# Patient Record
Sex: FEMALE | Race: WHITE | NOT HISPANIC OR LATINO
[De-identification: names, ages, dates, MRNs, and addresses within clinical notes are randomized per-mention and may not be internally consistent; named-entity substitution may affect disease eponyms.]

---

## 2017-10-30 ENCOUNTER — CLINICAL ADVICE (OUTPATIENT)
Age: 72
End: 2017-10-30

## 2017-11-19 DIAGNOSIS — Z82.49 FAMILY HISTORY OF ISCHEMIC HEART DISEASE AND OTHER DISEASES OF THE CIRCULATORY SYSTEM: ICD-10-CM

## 2017-11-19 DIAGNOSIS — Z80.8 FAMILY HISTORY OF MALIGNANT NEOPLASM OF OTHER ORGANS OR SYSTEMS: ICD-10-CM

## 2017-11-19 DIAGNOSIS — Z82.0 FAMILY HISTORY OF EPILEPSY AND OTHER DISEASES OF THE NERVOUS SYSTEM: ICD-10-CM

## 2017-11-19 DIAGNOSIS — Z87.891 PERSONAL HISTORY OF NICOTINE DEPENDENCE: ICD-10-CM

## 2017-11-19 DIAGNOSIS — K44.9 DIAPHRAGMATIC HERNIA W/OUT OBSTRUCTION OR GANGRENE: ICD-10-CM

## 2017-11-19 DIAGNOSIS — Z82.5 FAMILY HISTORY OF ASTHMA AND OTHER CHRONIC LOWER RESPIRATORY DISEASES: ICD-10-CM

## 2017-11-19 DIAGNOSIS — G44.009 CLUSTER HEADACHE SYNDROME, UNSPECIFIED, NOT INTRACTABLE: ICD-10-CM

## 2017-11-19 DIAGNOSIS — Z83.3 FAMILY HISTORY OF DIABETES MELLITUS: ICD-10-CM

## 2017-11-22 ENCOUNTER — APPOINTMENT (OUTPATIENT)
Dept: FAMILY MEDICINE | Facility: CLINIC | Age: 72
End: 2017-11-22
Payer: MEDICARE

## 2017-11-22 VITALS — DIASTOLIC BLOOD PRESSURE: 80 MMHG | SYSTOLIC BLOOD PRESSURE: 118 MMHG

## 2017-11-22 VITALS
HEART RATE: 87 BPM | OXYGEN SATURATION: 97 % | RESPIRATION RATE: 16 BRPM | BODY MASS INDEX: 27.99 KG/M2 | SYSTOLIC BLOOD PRESSURE: 130 MMHG | DIASTOLIC BLOOD PRESSURE: 90 MMHG | HEIGHT: 65 IN | WEIGHT: 168 LBS

## 2017-11-22 DIAGNOSIS — M12.819 OTHER SPECIFIC ARTHROPATHIES, NOT ELSEWHERE CLASSIFIED, UNSPECIFIED SHOULDER: ICD-10-CM

## 2017-11-22 PROCEDURE — G0008: CPT

## 2017-11-22 PROCEDURE — 90662 IIV NO PRSV INCREASED AG IM: CPT

## 2017-11-22 PROCEDURE — 99213 OFFICE O/P EST LOW 20 MIN: CPT | Mod: 25

## 2017-11-28 ENCOUNTER — APPOINTMENT (OUTPATIENT)
Dept: FAMILY MEDICINE | Facility: CLINIC | Age: 72
End: 2017-11-28

## 2017-12-20 ENCOUNTER — APPOINTMENT (OUTPATIENT)
Dept: FAMILY MEDICINE | Facility: CLINIC | Age: 72
End: 2017-12-20
Payer: MEDICARE

## 2017-12-20 VITALS
HEIGHT: 65 IN | HEART RATE: 85 BPM | RESPIRATION RATE: 16 BRPM | DIASTOLIC BLOOD PRESSURE: 93 MMHG | WEIGHT: 168 LBS | BODY MASS INDEX: 27.99 KG/M2 | SYSTOLIC BLOOD PRESSURE: 144 MMHG

## 2017-12-20 VITALS — SYSTOLIC BLOOD PRESSURE: 130 MMHG | DIASTOLIC BLOOD PRESSURE: 84 MMHG

## 2017-12-20 PROCEDURE — 99213 OFFICE O/P EST LOW 20 MIN: CPT

## 2018-01-02 ENCOUNTER — CLINICAL ADVICE (OUTPATIENT)
Age: 73
End: 2018-01-02

## 2018-01-18 ENCOUNTER — APPOINTMENT (OUTPATIENT)
Dept: FAMILY MEDICINE | Facility: CLINIC | Age: 73
End: 2018-01-18
Payer: MEDICARE

## 2018-01-18 VITALS
RESPIRATION RATE: 16 BRPM | BODY MASS INDEX: 27.99 KG/M2 | HEIGHT: 65 IN | HEART RATE: 80 BPM | WEIGHT: 168 LBS | SYSTOLIC BLOOD PRESSURE: 130 MMHG | OXYGEN SATURATION: 98 % | DIASTOLIC BLOOD PRESSURE: 82 MMHG

## 2018-01-18 PROCEDURE — 99213 OFFICE O/P EST LOW 20 MIN: CPT

## 2018-01-18 RX ORDER — DIAZEPAM 5 MG/1
5 TABLET ORAL 3 TIMES DAILY
Qty: 90 | Refills: 0 | Status: DISCONTINUED | COMMUNITY
Start: 2017-12-20 | End: 2018-01-18

## 2018-01-18 RX ORDER — METOPROLOL SUCCINATE 25 MG/1
25 TABLET, EXTENDED RELEASE ORAL DAILY
Qty: 90 | Refills: 3 | Status: DISCONTINUED | COMMUNITY
Start: 2017-12-20 | End: 2018-01-18

## 2018-01-18 RX ORDER — DIAZEPAM 5 MG/1
5 TABLET ORAL
Refills: 0 | Status: DISCONTINUED | COMMUNITY
End: 2018-01-18

## 2018-01-19 ENCOUNTER — RX RENEWAL (OUTPATIENT)
Age: 73
End: 2018-01-19

## 2018-01-31 ENCOUNTER — RX RENEWAL (OUTPATIENT)
Age: 73
End: 2018-01-31

## 2018-02-21 ENCOUNTER — APPOINTMENT (OUTPATIENT)
Dept: FAMILY MEDICINE | Facility: CLINIC | Age: 73
End: 2018-02-21
Payer: MEDICARE

## 2018-02-21 VITALS
HEART RATE: 77 BPM | OXYGEN SATURATION: 99 % | HEIGHT: 65 IN | DIASTOLIC BLOOD PRESSURE: 82 MMHG | WEIGHT: 170 LBS | BODY MASS INDEX: 28.32 KG/M2 | SYSTOLIC BLOOD PRESSURE: 118 MMHG | RESPIRATION RATE: 16 BRPM

## 2018-02-21 DIAGNOSIS — Z87.898 PERSONAL HISTORY OF OTHER SPECIFIED CONDITIONS: ICD-10-CM

## 2018-02-21 PROCEDURE — 99213 OFFICE O/P EST LOW 20 MIN: CPT

## 2018-02-21 RX ORDER — DIAZEPAM 5 MG/1
5 TABLET ORAL 3 TIMES DAILY
Qty: 90 | Refills: 0 | Status: DISCONTINUED | COMMUNITY
Start: 2017-11-22 | End: 2018-02-21

## 2018-03-16 ENCOUNTER — RX RENEWAL (OUTPATIENT)
Age: 73
End: 2018-03-16

## 2018-03-27 ENCOUNTER — RX RENEWAL (OUTPATIENT)
Age: 73
End: 2018-03-27

## 2018-04-10 ENCOUNTER — APPOINTMENT (OUTPATIENT)
Dept: FAMILY MEDICINE | Facility: CLINIC | Age: 73
End: 2018-04-10
Payer: MEDICARE

## 2018-04-10 VITALS
SYSTOLIC BLOOD PRESSURE: 112 MMHG | BODY MASS INDEX: 28.32 KG/M2 | TEMPERATURE: 98.2 F | WEIGHT: 170 LBS | HEART RATE: 54 BPM | DIASTOLIC BLOOD PRESSURE: 72 MMHG | RESPIRATION RATE: 16 BRPM | OXYGEN SATURATION: 98 % | HEIGHT: 65 IN

## 2018-04-10 PROCEDURE — 99213 OFFICE O/P EST LOW 20 MIN: CPT

## 2018-04-10 RX ORDER — DIAZEPAM 5 MG/1
5 TABLET ORAL 3 TIMES DAILY
Qty: 90 | Refills: 0 | Status: DISCONTINUED | COMMUNITY
Start: 2018-01-18 | End: 2018-04-10

## 2018-05-03 ENCOUNTER — APPOINTMENT (OUTPATIENT)
Dept: FAMILY MEDICINE | Facility: CLINIC | Age: 73
End: 2018-05-03
Payer: MEDICARE

## 2018-05-03 VITALS
SYSTOLIC BLOOD PRESSURE: 136 MMHG | HEIGHT: 65 IN | BODY MASS INDEX: 28.32 KG/M2 | DIASTOLIC BLOOD PRESSURE: 86 MMHG | OXYGEN SATURATION: 98 % | HEART RATE: 81 BPM | RESPIRATION RATE: 16 BRPM | WEIGHT: 170 LBS

## 2018-05-03 PROCEDURE — 99213 OFFICE O/P EST LOW 20 MIN: CPT

## 2018-05-03 RX ORDER — AMOXICILLIN 500 MG/1
500 CAPSULE ORAL 3 TIMES DAILY
Qty: 30 | Refills: 0 | Status: DISCONTINUED | COMMUNITY
Start: 2018-04-10 | End: 2018-05-03

## 2018-05-08 ENCOUNTER — RX RENEWAL (OUTPATIENT)
Age: 73
End: 2018-05-08

## 2018-05-17 ENCOUNTER — RX RENEWAL (OUTPATIENT)
Age: 73
End: 2018-05-17

## 2018-05-22 ENCOUNTER — APPOINTMENT (OUTPATIENT)
Dept: FAMILY MEDICINE | Facility: CLINIC | Age: 73
End: 2018-05-22
Payer: MEDICARE

## 2018-05-22 VITALS
HEART RATE: 76 BPM | SYSTOLIC BLOOD PRESSURE: 124 MMHG | RESPIRATION RATE: 15 BRPM | WEIGHT: 170 LBS | DIASTOLIC BLOOD PRESSURE: 78 MMHG | HEIGHT: 65 IN | OXYGEN SATURATION: 98 % | BODY MASS INDEX: 28.32 KG/M2

## 2018-05-22 PROCEDURE — 99214 OFFICE O/P EST MOD 30 MIN: CPT

## 2018-05-22 RX ORDER — DIPHENHYDRAMINE HCL 2 %
25 CREAM (GRAM) TOPICAL
Refills: 0 | Status: DISCONTINUED | COMMUNITY
End: 2018-05-22

## 2018-05-22 RX ORDER — METHYLPREDNISOLONE 4 MG/1
4 TABLET ORAL
Qty: 1 | Refills: 0 | Status: DISCONTINUED | COMMUNITY
Start: 2018-03-16 | End: 2018-05-22

## 2018-05-22 RX ORDER — FLUTICASONE PROPIONATE 50 UG/1
50 SPRAY, METERED NASAL DAILY
Qty: 1 | Refills: 5 | Status: DISCONTINUED | COMMUNITY
Start: 2018-04-10 | End: 2018-05-22

## 2018-05-22 RX ORDER — IRON/IRON ASP GLY/FA/MV-MIN 38 125-25-1MG
TABLET ORAL
Refills: 0 | Status: DISCONTINUED | COMMUNITY
End: 2018-05-22

## 2018-05-22 RX ORDER — DICLOFENAC SODIUM 10 MG/G
1 GEL TOPICAL
Refills: 0 | Status: DISCONTINUED | COMMUNITY
End: 2018-05-22

## 2018-05-22 RX ORDER — AZITHROMYCIN 250 MG/1
250 TABLET, FILM COATED ORAL
Qty: 6 | Refills: 0 | Status: DISCONTINUED | COMMUNITY
Start: 2018-05-03 | End: 2018-05-22

## 2018-05-22 RX ORDER — MULTIVIT-MIN/IRON/FOLIC ACID/K 18-600-40
CAPSULE ORAL
Refills: 0 | Status: DISCONTINUED | COMMUNITY
End: 2018-05-22

## 2018-05-22 NOTE — HISTORY OF PRESENT ILLNESS
[Previous Adverse Anesthesia Reaction] : previous adverse anesthesia reaction [Hypertension] : ~T hypertension [Coronary Artery Disease] : no coronary artery disease [FreeTextEntry1] : Left hip replacement  [FreeTextEntry2] : 05/30/2018 [FreeTextEntry3] : Dr. Calin Sharma  [FreeTextEntry4] : pt had all pre surgical testing done at [facility].  She saw Dr Sylvester last week due to the abnormality on the chest CT.  [FreeTextEntry5] : she had 2 epidural injections last week and had nausea and chills and anxiety after

## 2018-05-22 NOTE — RESULTS/DATA
[] : results reviewed [de-identified] : the EKG is unchanged from a prior EKG from her cardiology office

## 2018-05-22 NOTE — PHYSICAL EXAM
[No Acute Distress] : no acute distress [Well Nourished] : well nourished [Well Developed] : well developed [Well-Appearing] : well-appearing [Normal Voice/Communication] : normal voice/communication [Normal Sclera/Conjunctiva] : normal sclera/conjunctiva [Normal Outer Ear/Nose] : the outer ears and nose were normal in appearance [Normal Oropharynx] : the oropharynx was normal [Normal TMs] : both tympanic membranes were normal [No JVD] : no jugular venous distention [Supple] : supple [No Lymphadenopathy] : no lymphadenopathy [Thyroid Normal, No Nodules] : the thyroid was normal and there were no nodules present [No Respiratory Distress] : no respiratory distress  [Clear to Auscultation] : lungs were clear to auscultation bilaterally [No Accessory Muscle Use] : no accessory muscle use [Normal Rate] : normal rate  [Regular Rhythm] : with a regular rhythm [Normal S1, S2] : normal S1 and S2 [No Edema] : there was no peripheral edema [Soft] : abdomen soft [Non Tender] : non-tender [Non-distended] : non-distended [No Masses] : no abdominal mass palpated [No HSM] : no HSM [Normal Bowel Sounds] : normal bowel sounds [No Hernias] : no hernias [Speech Grossly Normal] : speech grossly normal [Memory Grossly Normal] : memory grossly normal [Normal Affect] : the affect was normal [Normal Mood] : the mood was normal [Normal Insight/Judgement] : insight and judgment were intact [de-identified] : antalgic gait

## 2018-05-22 NOTE — REVIEW OF SYSTEMS
[Joint Pain] : joint pain [Joint Stiffness] : joint stiffness [Muscle Pain] : muscle pain [Back Pain] : back pain [Headache] : headache [Insomnia] : insomnia [Anxiety] : anxiety [Negative] : Heme/Lymph

## 2018-05-22 NOTE — ASSESSMENT
[Patient Optimized for Surgery] : Patient optimized for surgery [No Further Testing Recommended] : no further testing recommended [Continue medications as is] : Continue current medications [As per surgery] : as per surgery [FreeTextEntry4] : she is medically cleared for surgery [FreeTextEntry7] : she should take Fioricet, metoprolol and nexium with a sip of water on the morning of surgery

## 2018-06-13 ENCOUNTER — APPOINTMENT (OUTPATIENT)
Dept: FAMILY MEDICINE | Facility: CLINIC | Age: 73
End: 2018-06-13
Payer: MEDICARE

## 2018-06-13 VITALS
SYSTOLIC BLOOD PRESSURE: 114 MMHG | HEART RATE: 84 BPM | RESPIRATION RATE: 15 BRPM | OXYGEN SATURATION: 97 % | HEIGHT: 65 IN | DIASTOLIC BLOOD PRESSURE: 72 MMHG | BODY MASS INDEX: 28.32 KG/M2 | WEIGHT: 170 LBS

## 2018-06-13 DIAGNOSIS — G47.00 INSOMNIA, UNSPECIFIED: ICD-10-CM

## 2018-06-13 PROCEDURE — 99495 TRANSJ CARE MGMT MOD F2F 14D: CPT

## 2018-06-13 NOTE — HISTORY OF PRESENT ILLNESS
[FreeTextEntry1] : pt presents for a follow up apt after left hip replacement. pt also has a horse voice that is straining to talk. feels like she isnt breathing the same as before she had the surgery, very fatigue.  [de-identified] : She has had a persistant hoarseness in her voice.  She denies any heartburn, nasal congestion or postnasal drip.  She is taking honey without improvement.  She isn't coughing but can't get a full deep breath.  She hasn't been sleeping well as she has to lie on her back.  She feels fatigued.  She stopped the oxycodone last week.  She is doing her home exercises.  She plans to schedule a repeat CT of the chest in november.  She is taking the diazepam tid.  She has an itchy rash on the back of her neck.  She tried melatonin and benadryl for sleep without improvement. She has to wake up several times per night to urinate.  She had spinal anesthesia for the surgery.  She wants to wait until our november visit to change from zetia to atorvastatin.  She wants to wait until september to receive a pneumococcal vaccine.

## 2018-06-13 NOTE — ASSESSMENT
[FreeTextEntry1] : ENT consultation was advised, she declined a rx for sleep, I renewed diazepam and checked I-STOP, she was reassured that her weakness and breathlessness will likely resolve soon, she will follow up in september for Prevnar 13 and in november for a med check or sooner prn

## 2018-06-13 NOTE — REVIEW OF SYSTEMS
[Fatigue] : fatigue [Hoarseness] : hoarseness [Sore Throat] : sore throat [Shortness Of Breath] : shortness of breath [Joint Pain] : joint pain [Joint Stiffness] : joint stiffness [Muscle Weakness] : muscle weakness [Muscle Pain] : muscle pain [Headache] : headache [Insomnia] : insomnia [Anxiety] : anxiety [Negative] : Heme/Lymph [Nasal Discharge] : no nasal discharge [Cough] : no cough

## 2018-06-13 NOTE — PHYSICAL EXAM
[No Acute Distress] : no acute distress [Well Nourished] : well nourished [Well Developed] : well developed [Well-Appearing] : well-appearing [Normal Voice/Communication] : normal voice/communication [Normal Sclera/Conjunctiva] : normal sclera/conjunctiva [Normal Outer Ear/Nose] : the outer ears and nose were normal in appearance [Normal Oropharynx] : the oropharynx was normal [Normal TMs] : both tympanic membranes were normal [No JVD] : no jugular venous distention [Supple] : supple [No Lymphadenopathy] : no lymphadenopathy [No Respiratory Distress] : no respiratory distress  [Clear to Auscultation] : lungs were clear to auscultation bilaterally [No Accessory Muscle Use] : no accessory muscle use [Normal Rate] : normal rate  [Regular Rhythm] : with a regular rhythm [Normal S1, S2] : normal S1 and S2 [No Murmur] : no murmur heard [Speech Grossly Normal] : speech grossly normal [Memory Grossly Normal] : memory grossly normal [Normal Affect] : the affect was normal [Normal Mood] : the mood was normal [Normal Insight/Judgement] : insight and judgment were intact [de-identified] : minimal erythematous, flat rash on posterior neck [de-identified] : gait is antalgic

## 2018-06-22 ENCOUNTER — APPOINTMENT (OUTPATIENT)
Dept: FAMILY MEDICINE | Facility: CLINIC | Age: 73
End: 2018-06-22
Payer: MEDICARE

## 2018-06-22 VITALS
OXYGEN SATURATION: 98 % | TEMPERATURE: 98.5 F | HEART RATE: 77 BPM | SYSTOLIC BLOOD PRESSURE: 114 MMHG | DIASTOLIC BLOOD PRESSURE: 70 MMHG

## 2018-06-22 VITALS — HEIGHT: 65 IN | WEIGHT: 170 LBS | BODY MASS INDEX: 28.32 KG/M2

## 2018-06-22 PROCEDURE — 99214 OFFICE O/P EST MOD 30 MIN: CPT

## 2018-06-22 NOTE — REVIEW OF SYSTEMS
[Joint Pain] : joint pain [Muscle Pain] : muscle pain [Negative] : Heme/Lymph [FreeTextEntry9] : severe pain in left groin

## 2018-06-22 NOTE — HISTORY OF PRESENT ILLNESS
[Hypertension] : ~T hypertension [Coronary Artery Disease] : no coronary artery disease [Diabetes] : no diabetes [Sleep Apnea] : no sleep apnea [COPD] : no COPD [Previous Adverse Anesthesia Reaction] : no previous adverse anesthesia reaction [FreeTextEntry1] : Left total hip revision [FreeTextEntry2] : 06/25/18 [FreeTextEntry3] : Dr. Calin Grullon  [FreeTextEntry4] : Pt is here for medical clearance. Pt had PST at [facility] on 6/21/18.  The part of the artificial hip in the femur slipped down and she is in severe pain.

## 2018-06-22 NOTE — PHYSICAL EXAM
[No Acute Distress] : no acute distress [Well Nourished] : well nourished [Well Developed] : well developed [Well-Appearing] : well-appearing [Normal Voice/Communication] : normal voice/communication [Normal Sclera/Conjunctiva] : normal sclera/conjunctiva [Normal Outer Ear/Nose] : the outer ears and nose were normal in appearance [Normal Oropharynx] : the oropharynx was normal [No JVD] : no jugular venous distention [Supple] : supple [No Lymphadenopathy] : no lymphadenopathy [Thyroid Normal, No Nodules] : the thyroid was normal and there were no nodules present [No Respiratory Distress] : no respiratory distress  [Clear to Auscultation] : lungs were clear to auscultation bilaterally [No Accessory Muscle Use] : no accessory muscle use [Normal Rate] : normal rate  [Regular Rhythm] : with a regular rhythm [Normal S1, S2] : normal S1 and S2 [No Murmur] : no murmur heard [Soft] : abdomen soft [Non Tender] : non-tender [Non-distended] : non-distended [No Masses] : no abdominal mass palpated [No HSM] : no HSM [Normal Bowel Sounds] : normal bowel sounds [No Hernias] : no hernias [Speech Grossly Normal] : speech grossly normal [Memory Grossly Normal] : memory grossly normal [Normal Affect] : the affect was normal [Normal Mood] : the mood was normal [Normal Insight/Judgement] : insight and judgment were intact [de-identified] : minimal edema of LLE [de-identified] : walks with a walker

## 2018-07-03 ENCOUNTER — APPOINTMENT (OUTPATIENT)
Dept: FAMILY MEDICINE | Facility: CLINIC | Age: 73
End: 2018-07-03
Payer: MEDICARE

## 2018-07-03 VITALS
OXYGEN SATURATION: 98 % | BODY MASS INDEX: 28.32 KG/M2 | WEIGHT: 170 LBS | RESPIRATION RATE: 14 BRPM | HEIGHT: 65 IN | SYSTOLIC BLOOD PRESSURE: 122 MMHG | HEART RATE: 94 BPM | DIASTOLIC BLOOD PRESSURE: 60 MMHG

## 2018-07-03 PROCEDURE — 99214 OFFICE O/P EST MOD 30 MIN: CPT

## 2018-07-03 RX ORDER — OXYCODONE 5 MG/1
5 TABLET ORAL
Qty: 56 | Refills: 0 | Status: DISCONTINUED | COMMUNITY
Start: 2018-06-01 | End: 2018-07-03

## 2018-07-03 RX ORDER — TRAMADOL HYDROCHLORIDE 50 MG/1
50 TABLET, COATED ORAL
Qty: 56 | Refills: 0 | Status: DISCONTINUED | COMMUNITY
Start: 2018-06-27 | End: 2018-07-03

## 2018-07-03 NOTE — ASSESSMENT
[FreeTextEntry1] : she was advised to continue to apply ice to the wound and heat to her neck, she can continue with fioricet as needed and was advised to continue on miralax bid, she will call in 1 month to discuss changing from zetia to a statin and will follow up in 4 months or sooner prn, she was given a lot of emotional support

## 2018-07-03 NOTE — REVIEW OF SYSTEMS
[Fatigue] : fatigue [Constipation] : constipation [Joint Pain] : joint pain [Joint Stiffness] : joint stiffness [Muscle Weakness] : muscle weakness [Muscle Pain] : muscle pain [Headache] : headache [Anxiety] : anxiety [Negative] : Heme/Lymph [Nausea] : no nausea [Diarrhea] : diarrhea [Vomiting] : no vomiting [Fainting] : no fainting [de-identified] : surgical wound

## 2018-07-03 NOTE — PHYSICAL EXAM
[No Acute Distress] : no acute distress [Well Nourished] : well nourished [Well Developed] : well developed [Well-Appearing] : well-appearing [Normal Voice/Communication] : normal voice/communication [No Respiratory Distress] : no respiratory distress  [Clear to Auscultation] : lungs were clear to auscultation bilaterally [No Accessory Muscle Use] : no accessory muscle use [Normal Rate] : normal rate  [Regular Rhythm] : with a regular rhythm [Normal S1, S2] : normal S1 and S2 [Soft] : abdomen soft [Non Tender] : non-tender [Non-distended] : non-distended [Normal Bowel Sounds] : normal bowel sounds [Speech Grossly Normal] : speech grossly normal [Memory Grossly Normal] : memory grossly normal [Normal Affect] : the affect was normal [Normal Mood] : the mood was normal [Normal Insight/Judgement] : insight and judgment were intact [de-identified] : small amount of swelling in LLE [de-identified] : large incision on left posterior hip, no erythema or exudate, mild swelling laterally [de-identified] : uses a walker

## 2018-07-03 NOTE — HISTORY OF PRESENT ILLNESS
[FreeTextEntry1] : Patient presents for post- procedure follow up   [de-identified] : She was prescribed tramadol and became very constipated from it.  She tried miralax for 2 days without relief.  She called the nurse for the insurance company and was advised to drink hot prune juice and did have a BM last night.  She has a lot of soreness around the incision.  She is wondering if it is a good time to change from zetia to a statin.  She has had aching in her head.  Her BP at home has been running low.

## 2018-07-30 PROBLEM — M12.819 ROTATOR CUFF ARTHROPATHY: Status: ACTIVE | Noted: 2017-11-19

## 2018-08-13 ENCOUNTER — RX RENEWAL (OUTPATIENT)
Age: 73
End: 2018-08-13

## 2018-08-28 ENCOUNTER — RX RENEWAL (OUTPATIENT)
Age: 73
End: 2018-08-28

## 2018-09-05 ENCOUNTER — APPOINTMENT (OUTPATIENT)
Dept: FAMILY MEDICINE | Facility: CLINIC | Age: 73
End: 2018-09-05
Payer: MEDICARE

## 2018-09-05 VITALS
BODY MASS INDEX: 27.64 KG/M2 | SYSTOLIC BLOOD PRESSURE: 118 MMHG | OXYGEN SATURATION: 98 % | WEIGHT: 172 LBS | DIASTOLIC BLOOD PRESSURE: 74 MMHG | RESPIRATION RATE: 14 BRPM | HEIGHT: 66 IN | HEART RATE: 78 BPM

## 2018-09-05 DIAGNOSIS — R03.0 ELEVATED BLOOD-PRESSURE READING, W/OUT DIAGNOSIS OF HYPERTENSION: ICD-10-CM

## 2018-09-05 DIAGNOSIS — Z87.09 PERSONAL HISTORY OF OTHER DISEASES OF THE RESPIRATORY SYSTEM: ICD-10-CM

## 2018-09-05 DIAGNOSIS — J30.9 ALLERGIC RHINITIS, UNSPECIFIED: ICD-10-CM

## 2018-09-05 DIAGNOSIS — R23.2 FLUSHING: ICD-10-CM

## 2018-09-05 PROCEDURE — 99214 OFFICE O/P EST MOD 30 MIN: CPT

## 2018-09-05 RX ORDER — POLYETHYLENE GLYCOL 3350 17 G/17G
17 POWDER, FOR SOLUTION ORAL
Qty: 1 | Refills: 2 | Status: DISCONTINUED | COMMUNITY
Start: 2018-07-03 | End: 2018-09-05

## 2018-09-05 NOTE — ASSESSMENT
[FreeTextEntry1] : she was advised to start fluticasone nasal spray, she declined starting Mucinex due to side effects in the past, she will call for any recurrence of fever or worsening of symptoms.  She was advised to consider starting duloxetine for her anxiety and it may help with her pain, she was advised against increasing diazepam as it is meant for short-term use and potentially addictive.

## 2018-09-05 NOTE — REVIEW OF SYSTEMS
[Fever] : fever [Chills] : chills [Fatigue] : fatigue [Earache] : earache [Nasal Discharge] : nasal discharge [Sore Throat] : sore throat [Postnasal Drip] : postnasal drip [Wheezing] : wheezing [Cough] : cough [Joint Pain] : joint pain [Joint Stiffness] : joint stiffness [Headache] : headache [Unsteady Walking] : ataxia [Anxiety] : anxiety [Negative] : Heme/Lymph [Shortness Of Breath] : no shortness of breath [Dyspnea on Exertion] : no dyspnea on exertion [Swollen Glands] : no swollen glands [FreeTextEntry9] : groin pain

## 2018-09-05 NOTE — PHYSICAL EXAM
[No Acute Distress] : no acute distress [Well Nourished] : well nourished [Well Developed] : well developed [Well-Appearing] : well-appearing [Normal Voice/Communication] : normal voice/communication [Normal Sclera/Conjunctiva] : normal sclera/conjunctiva [Normal Outer Ear/Nose] : the outer ears and nose were normal in appearance [Normal Oropharynx] : the oropharynx was normal [Normal TMs] : both tympanic membranes were normal [Supple] : supple [No Lymphadenopathy] : no lymphadenopathy [No Respiratory Distress] : no respiratory distress  [Clear to Auscultation] : lungs were clear to auscultation bilaterally [No Accessory Muscle Use] : no accessory muscle use [Normal Rate] : normal rate  [Regular Rhythm] : with a regular rhythm [Normal S1, S2] : normal S1 and S2 [Speech Grossly Normal] : speech grossly normal [Memory Grossly Normal] : memory grossly normal [Normal Affect] : the affect was normal [Normal Insight/Judgement] : insight and judgment were intact [de-identified] : uses a cane [de-identified] : mildly anxious and tearful

## 2018-09-05 NOTE — HISTORY OF PRESENT ILLNESS
[FreeTextEntry8] : Pt was cleaning her vacuum and said since she has +runny nose +chest congestion +coughing up yellow phlegm.  About 10 days ago she cleaned her vacuum and since then she has had sinus congestion and a postnasal drip.  She has been taking benadryl.  She has pain in her ears and she is starting with chest congestion.  Her mucous is now yellow especially in the morning and at night.  She ran a fever for a few days last week. She still has pain in the groin.  She had seen Dr Martinez in may for pain management.  She is feeling very anxious and is wondering about increasing the diazepam.  She is under a lot of stress with her brother and has been arguing with her .

## 2018-10-27 ENCOUNTER — RX RENEWAL (OUTPATIENT)
Age: 73
End: 2018-10-27

## 2018-11-26 ENCOUNTER — APPOINTMENT (OUTPATIENT)
Dept: FAMILY MEDICINE | Facility: CLINIC | Age: 73
End: 2018-11-26
Payer: MEDICARE

## 2018-11-26 VITALS
RESPIRATION RATE: 14 BRPM | SYSTOLIC BLOOD PRESSURE: 124 MMHG | WEIGHT: 174 LBS | OXYGEN SATURATION: 98 % | DIASTOLIC BLOOD PRESSURE: 76 MMHG | BODY MASS INDEX: 27.97 KG/M2 | HEART RATE: 84 BPM | HEIGHT: 66 IN

## 2018-11-26 VITALS — SYSTOLIC BLOOD PRESSURE: 126 MMHG | DIASTOLIC BLOOD PRESSURE: 62 MMHG

## 2018-11-26 LAB — CYTOLOGY CVX/VAG DOC THIN PREP: NORMAL

## 2018-11-26 PROCEDURE — 90670 PCV13 VACCINE IM: CPT

## 2018-11-26 PROCEDURE — G0008: CPT

## 2018-11-26 PROCEDURE — G0439: CPT

## 2018-11-26 PROCEDURE — G0009: CPT

## 2018-11-26 PROCEDURE — 90662 IIV NO PRSV INCREASED AG IM: CPT

## 2018-11-26 RX ORDER — DICLOFENAC EPOLAMINE 0.01 G/1
1.3 SYSTEM TOPICAL
Refills: 0 | Status: DISCONTINUED | COMMUNITY
End: 2018-11-26

## 2018-11-26 RX ORDER — METOPROLOL SUCCINATE 25 MG/1
25 TABLET, EXTENDED RELEASE ORAL
Qty: 180 | Refills: 3 | Status: DISCONTINUED | COMMUNITY
End: 2018-11-26

## 2018-11-26 NOTE — REVIEW OF SYSTEMS
[Lower Ext Edema] : lower extremity edema [Joint Pain] : joint pain [Joint Stiffness] : joint stiffness [Muscle Pain] : muscle pain [Headache] : headache [Anxiety] : anxiety [Negative] : Heme/Lymph [Depression] : no depression

## 2018-11-26 NOTE — ASSESSMENT
[FreeTextEntry1] : Prevnar 13 and high-dose flu vaccines were given, she will increase the metoprolol succinate ER to 37.5 mg bid, she will follow up with specialists as scheduled and with me in 3 months, labs were reviewed

## 2018-11-26 NOTE — PHYSICAL EXAM
[No Acute Distress] : no acute distress [Well Nourished] : well nourished [Well Developed] : well developed [Well-Appearing] : well-appearing [Normal Voice/Communication] : normal voice/communication [Normal Sclera/Conjunctiva] : normal sclera/conjunctiva [Normal Outer Ear/Nose] : the outer ears and nose were normal in appearance [Normal Oropharynx] : the oropharynx was normal [Normal TMs] : both tympanic membranes were normal [No JVD] : no jugular venous distention [Supple] : supple [No Lymphadenopathy] : no lymphadenopathy [Thyroid Normal, No Nodules] : the thyroid was normal and there were no nodules present [No Respiratory Distress] : no respiratory distress  [Clear to Auscultation] : lungs were clear to auscultation bilaterally [No Accessory Muscle Use] : no accessory muscle use [Normal Rate] : normal rate  [Regular Rhythm] : with a regular rhythm [Normal S1, S2] : normal S1 and S2 [No Carotid Bruits] : no carotid bruits [Soft] : abdomen soft [Non Tender] : non-tender [Non-distended] : non-distended [No Masses] : no abdominal mass palpated [No HSM] : no HSM [Normal Bowel Sounds] : normal bowel sounds [No Hernias] : no hernias [Speech Grossly Normal] : speech grossly normal [Memory Grossly Normal] : memory grossly normal [Normal Affect] : the affect was normal [Normal Mood] : the mood was normal [Normal Insight/Judgement] : insight and judgment were intact [de-identified] : swelling of bilateral LE, left is worse

## 2018-11-26 NOTE — HISTORY OF PRESENT ILLNESS
[FreeTextEntry1] : Pt presents for medicare wellness [de-identified] : She had a follow up visit with Dr Sylvester today and the recent CT is unchanged and she will have a 6 month repeat.  She is scheduled to have the left hip aspirated and to receive a steroid injection in the ileopsoas area.  She has been seeing Dr Gonzalez.  She was prescribed Lyrica but when the dose was increased to 50 mg bid she developed swelling of the left leg.  Dr Hurtado had started her on isosorbide but she had to stop it due to severe headaches and an ocular migraine.  She saw the eye doctor.  Since stopping it the optical migraines resolved.  He started her on amlodipine but she had to stop it for 5 days due to leg swelling.  Her brother is now in assisted living but she is still under increased stress selling his home.  She is scheduled for a nuclear stress test 12/3 and then will see Dr Hurtado soon after.  Medicare refused to cover the HgA1C and some other test.

## 2018-11-26 NOTE — HEALTH RISK ASSESSMENT
[Fair] : ~his/her~ current health as fair  [Good] : ~his/her~  mood as  good [No falls in past year] : Patient reported no falls in the past year [HIV test declined] : HIV test declined [Hepatitis C test declined] : Hepatitis C test declined [None] : None [With Significant Other] : lives with significant other [Retired] : retired [High School] : high school [] :  [Feels Safe at Home] : Feels safe at home [Fully functional (bathing, dressing, toileting, transferring, walking, feeding)] : Fully functional (bathing, dressing, toileting, transferring, walking, feeding) [Fully functional (using the telephone, shopping, preparing meals, housekeeping, doing laundry, using] : Fully functional and needs no help or supervision to perform IADLs (using the telephone, shopping, preparing meals, housekeeping, doing laundry, using transportation, managing medications and managing finances) [Smoke Detector] : smoke detector [Carbon Monoxide Detector] : carbon monoxide detector [Guns at Home] : guns at home [Seat Belt] :  uses seat belt [Sunscreen] : uses sunscreen [Patient declined discussion] : Patient declined discussion [FreeTextEntry1] : persistant hip pain, swelling of legs [] : No [de-identified] : none [de-identified] : Dr Chacon, Dr Gonzalez, Dr Sylvester, Dr Hurtado [Change in mental status noted] : No change in mental status noted [Language] : denies difficulty with language [Behavior] : denies difficulty with behavior [Learning/Retaining New Information] : denies difficulty learning/retaining new information [Handling Complex Tasks] : denies difficulty handling complex tasks [Spatial Ability and Orientation] : denies difficulty with spatial ability and orientation [Reports changes in hearing] : Reports no changes in hearing [Reports changes in vision] : Reports no changes in vision [Reports changes in dental health] : Reports no changes in dental health [Travel to Developing Areas] : does not  travel to developing areas [TB Exposure] : is not being exposed to tuberculosis [Caregiver Concerns] : does not have caregiver concerns [de-identified] : securely locked up

## 2018-12-27 ENCOUNTER — MEDICATION RENEWAL (OUTPATIENT)
Age: 73
End: 2018-12-27

## 2019-01-10 ENCOUNTER — APPOINTMENT (OUTPATIENT)
Dept: FAMILY MEDICINE | Facility: CLINIC | Age: 74
End: 2019-01-10
Payer: MEDICARE

## 2019-01-10 VITALS
SYSTOLIC BLOOD PRESSURE: 114 MMHG | OXYGEN SATURATION: 98 % | BODY MASS INDEX: 28.12 KG/M2 | HEART RATE: 83 BPM | DIASTOLIC BLOOD PRESSURE: 76 MMHG | HEIGHT: 66 IN | WEIGHT: 175 LBS

## 2019-01-10 PROCEDURE — 99213 OFFICE O/P EST LOW 20 MIN: CPT

## 2019-01-10 NOTE — PHYSICAL EXAM
[No Acute Distress] : no acute distress [Well Nourished] : well nourished [Well Developed] : well developed [Well-Appearing] : well-appearing [Memory Grossly Normal] : memory grossly normal [Normal Affect] : the affect was normal [Normal Insight/Judgement] : insight and judgment were intact [de-identified] : nonpitting edema of LLE [de-identified] : she is tearful and anxious

## 2019-01-10 NOTE — HISTORY OF PRESENT ILLNESS
[FreeTextEntry1] : Pt presents for med check [de-identified] : She presents for follow up.  She is still under increased stress.  She has persistant pain in the left hip and swelling in the leg.  She saw Dr Penny who advised her that he couldn't help her as her hip is the problem.  She was referred to Dr Guerra for a second ortho opinion and will see him on 1/24.  She saw Dr Grullon who advised her to have a MRI and she may need further surgery to cut a tendon. She is very frustrated and afraid and doesn't know what to do.  She is tolerating the increased dose of metoprolol.  She is getting good relief with the diazepam and needing the fioricet frequently for her headaches.  She is afraid to try an antidepressant

## 2019-01-10 NOTE — PHYSICAL EXAM
[No Acute Distress] : no acute distress [Well Nourished] : well nourished [Well Developed] : well developed [Well-Appearing] : well-appearing [Memory Grossly Normal] : memory grossly normal [Normal Affect] : the affect was normal [Normal Insight/Judgement] : insight and judgment were intact [de-identified] : nonpitting edema of LLE [de-identified] : she is tearful and anxious

## 2019-01-10 NOTE — ASSESSMENT
[FreeTextEntry1] : I ordered a venous doppler of the LLE, she was given emotional support, no medication changes were made and she will follow up on 2/27 as scheduled and may call for a renewal of meds prior if needed

## 2019-01-10 NOTE — HISTORY OF PRESENT ILLNESS
[FreeTextEntry1] : Pt presents for med check [de-identified] : She presents for follow up.  She is still under increased stress.  She has persistant pain in the left hip and swelling in the leg.  She saw Dr Penny who advised her that he couldn't help her as her hip is the problem.  She was referred to Dr Guerra for a second ortho opinion and will see him on 1/24.  She saw Dr Grullon who advised her to have a MRI and she may need further surgery to cut a tendon. She is very frustrated and afraid and doesn't know what to do.  She is tolerating the increased dose of metoprolol.  She is getting good relief with the diazepam and needing the fioricet frequently for her headaches.  She is afraid to try an antidepressant

## 2019-01-10 NOTE — REVIEW OF SYSTEMS
[Lower Ext Edema] : lower extremity edema [Headache] : headache [Anxiety] : anxiety [Depression] : depression [Negative] : Heme/Lymph [FreeTextEntry5] : LLE swelling [FreeTextEntry9] : left hip pain

## 2019-01-18 ENCOUNTER — RX RENEWAL (OUTPATIENT)
Age: 74
End: 2019-01-18

## 2019-02-23 RX ORDER — PREGABALIN 25 MG/1
25 CAPSULE ORAL TWICE DAILY
Refills: 0 | Status: DISCONTINUED | COMMUNITY
End: 2019-02-23

## 2019-02-25 ENCOUNTER — RX RENEWAL (OUTPATIENT)
Age: 74
End: 2019-02-25

## 2019-03-01 ENCOUNTER — APPOINTMENT (OUTPATIENT)
Dept: FAMILY MEDICINE | Facility: CLINIC | Age: 74
End: 2019-03-01
Payer: MEDICARE

## 2019-03-01 VITALS
RESPIRATION RATE: 14 BRPM | HEART RATE: 72 BPM | OXYGEN SATURATION: 98 % | SYSTOLIC BLOOD PRESSURE: 128 MMHG | BODY MASS INDEX: 28.61 KG/M2 | HEIGHT: 66 IN | WEIGHT: 178 LBS | DIASTOLIC BLOOD PRESSURE: 72 MMHG

## 2019-03-01 VITALS — DIASTOLIC BLOOD PRESSURE: 78 MMHG | SYSTOLIC BLOOD PRESSURE: 128 MMHG

## 2019-03-01 DIAGNOSIS — Z87.19 PERSONAL HISTORY OF OTHER DISEASES OF THE DIGESTIVE SYSTEM: ICD-10-CM

## 2019-03-01 PROCEDURE — 99213 OFFICE O/P EST LOW 20 MIN: CPT

## 2019-03-01 NOTE — REVIEW OF SYSTEMS
[Nasal Discharge] : nasal discharge [Postnasal Drip] : postnasal drip [Palpitations] : palpitations [Abdominal Pain] : abdominal pain [Joint Pain] : joint pain [Joint Stiffness] : joint stiffness [Muscle Weakness] : muscle weakness [Muscle Pain] : muscle pain [Headache] : headache [Insomnia] : insomnia [Anxiety] : anxiety [Negative] : Heme/Lymph

## 2019-03-01 NOTE — PHYSICAL EXAM
[No Acute Distress] : no acute distress [Well Nourished] : well nourished [Well Developed] : well developed [Well-Appearing] : well-appearing [Normal Voice/Communication] : normal voice/communication [No Respiratory Distress] : no respiratory distress  [Clear to Auscultation] : lungs were clear to auscultation bilaterally [No Accessory Muscle Use] : no accessory muscle use [Normal Rate] : normal rate  [Regular Rhythm] : with a regular rhythm [Normal S1, S2] : normal S1 and S2 [Speech Grossly Normal] : speech grossly normal [Memory Grossly Normal] : memory grossly normal [Normal Affect] : the affect was normal [Normal Mood] : the mood was normal [Normal Insight/Judgement] : insight and judgment were intact [de-identified] : mild edema in LLE [de-identified] : uses a cane

## 2019-03-01 NOTE — PLAN
[FreeTextEntry1] : she was advised to continue current tx, I renewed diazepam and checked I-STOP, ref #[id number], she was advised that in the future when her life is less stressful that I would like to start to slowly wean down on the fioricet, she will follow up in 3 months and check labs prior

## 2019-03-01 NOTE — HISTORY OF PRESENT ILLNESS
[FreeTextEntry1] : Patient presents for 3 month check\par  [de-identified] : She continues to be under a lot of stress, her sister in law passed away and her brother was hospitalized for colitis and now he is in rehab.  She started herself back on nexium.  She has been bloated and has some discomfort in her upper abdomen.  She will follow up with Dr Queen if unimproved.  She hasn't been sleeping well.  She sometimes takes the diazepam for sleep and would like to renew it.  She will need fioricet renewed in a few weeks.  She has palpitations again.  The left leg swelling still comes and goes.  She saw Dr Guerra who advised her to do strength training exercises which she is doing at home.  She hasn't noticed any improvement so far.

## 2019-04-05 ENCOUNTER — MEDICATION RENEWAL (OUTPATIENT)
Age: 74
End: 2019-04-05

## 2019-04-08 ENCOUNTER — APPOINTMENT (OUTPATIENT)
Dept: FAMILY MEDICINE | Facility: CLINIC | Age: 74
End: 2019-04-08
Payer: MEDICARE

## 2019-04-08 ENCOUNTER — RX RENEWAL (OUTPATIENT)
Age: 74
End: 2019-04-08

## 2019-04-08 VITALS
RESPIRATION RATE: 14 BRPM | OXYGEN SATURATION: 97 % | HEART RATE: 85 BPM | BODY MASS INDEX: 28.61 KG/M2 | WEIGHT: 178 LBS | SYSTOLIC BLOOD PRESSURE: 120 MMHG | DIASTOLIC BLOOD PRESSURE: 70 MMHG | HEIGHT: 66 IN

## 2019-04-08 PROCEDURE — 99213 OFFICE O/P EST LOW 20 MIN: CPT

## 2019-04-08 RX ORDER — PREGABALIN 50 MG/1
50 CAPSULE ORAL
Qty: 80 | Refills: 0 | Status: DISCONTINUED | COMMUNITY
Start: 2018-11-14 | End: 2019-04-08

## 2019-04-08 RX ORDER — AMLODIPINE BESYLATE 5 MG/1
5 TABLET ORAL
Qty: 30 | Refills: 0 | Status: DISCONTINUED | COMMUNITY
Start: 2018-11-05 | End: 2019-04-08

## 2019-04-08 RX ORDER — BUDESONIDE AND FORMOTEROL FUMARATE DIHYDRATE 80; 4.5 UG/1; UG/1
80-4.5 AEROSOL RESPIRATORY (INHALATION) TWICE DAILY
Qty: 1 | Refills: 3 | Status: DISCONTINUED | COMMUNITY
Start: 2019-04-08 | End: 2019-04-08

## 2019-04-08 RX ORDER — BUTALBITAL AND ACETAMINOPHEN 300; 50 MG/1; MG/1
50-300 TABLET ORAL
Qty: 6 | Refills: 0 | Status: DISCONTINUED | COMMUNITY
Start: 2018-11-05 | End: 2019-04-08

## 2019-04-08 RX ORDER — MUPIROCIN 20 MG/G
2 OINTMENT TOPICAL
Qty: 22 | Refills: 0 | Status: DISCONTINUED | COMMUNITY
Start: 2018-06-21 | End: 2019-04-08

## 2019-04-08 NOTE — HISTORY OF PRESENT ILLNESS
[___ Days ago] : [unfilled] days ago [FreeTextEntry8] : She started with a sore throat and now has postnasal drip and now has a barky cough.  She has some sinus congestion and her ears feel clogged.  She is taking an antihistamine.  She hasn't had a fever or bad body aches.

## 2019-04-08 NOTE — PLAN
[FreeTextEntry1] : she was advised to start symbicort, rest and drink extra fluids and to follow up if symptoms persist or worsen

## 2019-04-08 NOTE — PHYSICAL EXAM
[No Acute Distress] : no acute distress [Well Nourished] : well nourished [Well Developed] : well developed [Well-Appearing] : well-appearing [Normal Voice/Communication] : normal voice/communication [Normal Sclera/Conjunctiva] : normal sclera/conjunctiva [Normal Outer Ear/Nose] : the outer ears and nose were normal in appearance [Normal Oropharynx] : the oropharynx was normal [Normal TMs] : both tympanic membranes were normal [Supple] : supple [No Lymphadenopathy] : no lymphadenopathy [No Respiratory Distress] : no respiratory distress  [No Accessory Muscle Use] : no accessory muscle use [Normal Rate] : normal rate  [Regular Rhythm] : with a regular rhythm [Normal S1, S2] : normal S1 and S2 [Speech Grossly Normal] : speech grossly normal [Memory Grossly Normal] : memory grossly normal [Normal Affect] : the affect was normal [Normal Mood] : the mood was normal [Normal Insight/Judgement] : insight and judgment were intact [de-identified] : few upper airway expiratory wheezes

## 2019-05-08 ENCOUNTER — RX RENEWAL (OUTPATIENT)
Age: 74
End: 2019-05-08

## 2019-05-10 ENCOUNTER — CLINICAL ADVICE (OUTPATIENT)
Age: 74
End: 2019-05-10

## 2019-05-31 ENCOUNTER — RESULT REVIEW (OUTPATIENT)
Age: 74
End: 2019-05-31

## 2019-06-03 ENCOUNTER — APPOINTMENT (OUTPATIENT)
Dept: FAMILY MEDICINE | Facility: CLINIC | Age: 74
End: 2019-06-03
Payer: MEDICARE

## 2019-06-03 VITALS — DIASTOLIC BLOOD PRESSURE: 70 MMHG | SYSTOLIC BLOOD PRESSURE: 122 MMHG

## 2019-06-03 VITALS
BODY MASS INDEX: 29.21 KG/M2 | RESPIRATION RATE: 12 BRPM | WEIGHT: 181 LBS | HEART RATE: 70 BPM | DIASTOLIC BLOOD PRESSURE: 80 MMHG | SYSTOLIC BLOOD PRESSURE: 118 MMHG | OXYGEN SATURATION: 98 %

## 2019-06-03 DIAGNOSIS — Z87.09 PERSONAL HISTORY OF OTHER DISEASES OF THE RESPIRATORY SYSTEM: ICD-10-CM

## 2019-06-03 PROCEDURE — 99213 OFFICE O/P EST LOW 20 MIN: CPT

## 2019-06-03 RX ORDER — DICLOFENAC EPOLAMINE 0.01 G/1
1.3 SYSTEM TOPICAL
Refills: 0 | Status: DISCONTINUED | COMMUNITY
End: 2019-06-03

## 2019-06-03 RX ORDER — ESOMEPRAZOLE MAGNESIUM 40 MG/1
40 CAPSULE, DELAYED RELEASE ORAL
Qty: 1 | Refills: 3 | Status: DISCONTINUED | COMMUNITY
End: 2019-06-03

## 2019-06-03 RX ORDER — FLUTICASONE PROPIONATE 50 UG/1
50 SPRAY, METERED NASAL DAILY
Qty: 1 | Refills: 3 | Status: DISCONTINUED | COMMUNITY
Start: 2019-02-25 | End: 2019-06-03

## 2019-06-03 RX ORDER — FLUTICASONE FUROATE AND VILANTEROL TRIFENATATE 100; 25 UG/1; UG/1
100-25 POWDER RESPIRATORY (INHALATION) DAILY
Qty: 1 | Refills: 3 | Status: DISCONTINUED | COMMUNITY
Start: 2019-04-08 | End: 2019-06-03

## 2019-06-03 NOTE — HISTORY OF PRESENT ILLNESS
[FreeTextEntry1] : pt presents for 3 month check  [de-identified] : She had labs to check for cadmium and cobalt from Dr Grullon and was advised that they were normal.  She still has swelling of her leg and tried to wear compression stockings.  The cough from april improved with Breo.  She is scheduled to see a Dr Sylvester to discuss the CT report.  She saw Dr Queen and she advised that she may need an upper endoscopy for dyspepsia, she gave her samples of FD Guard which she took for 5 days without improvement.  She saw Dr Grullon and he ordered some stretching exercises.  The hip pain occurs now with changes in position.  She is still under increased stress as she is trying to sell her brother's home and isn't sleeping well.  The diazepam is helping with her anxiety and the fioricet is helping with her headaches.

## 2019-06-03 NOTE — PLAN
[FreeTextEntry1] : labs were reviewed, she will continue current meds and healthy diet, she was encouraged to wear the compression stockings, she may call for medication renewals when needed and follow up in 3 months or sooner prn

## 2019-06-03 NOTE — PHYSICAL EXAM
[No Acute Distress] : no acute distress [Well Nourished] : well nourished [Well Developed] : well developed [Well-Appearing] : well-appearing [Normal Voice/Communication] : normal voice/communication [Clear to Auscultation] : lungs were clear to auscultation bilaterally [No Respiratory Distress] : no respiratory distress  [Normal Rate] : normal rate  [No Accessory Muscle Use] : no accessory muscle use [Normal S1, S2] : normal S1 and S2 [Regular Rhythm] : with a regular rhythm [No Carotid Bruits] : no carotid bruits [Speech Grossly Normal] : speech grossly normal [Memory Grossly Normal] : memory grossly normal [Normal Affect] : the affect was normal [Normal Insight/Judgement] : insight and judgment were intact [Normal Mood] : the mood was normal [de-identified] : minimal LLE edema, spider veins visible [de-identified] : walking with a cane

## 2019-06-03 NOTE — REVIEW OF SYSTEMS
[Lower Ext Edema] : lower extremity edema [Headache] : headache [Insomnia] : insomnia [Anxiety] : anxiety [Negative] : Heme/Lymph [FreeTextEntry9] : left hip pain [FreeTextEntry5] : left leg swelling

## 2019-06-29 ENCOUNTER — APPOINTMENT (OUTPATIENT)
Dept: FAMILY MEDICINE | Facility: CLINIC | Age: 74
End: 2019-06-29
Payer: MEDICARE

## 2019-06-29 VITALS
WEIGHT: 181 LBS | BODY MASS INDEX: 29.09 KG/M2 | HEART RATE: 77 BPM | SYSTOLIC BLOOD PRESSURE: 120 MMHG | DIASTOLIC BLOOD PRESSURE: 80 MMHG | RESPIRATION RATE: 14 BRPM | HEIGHT: 66 IN | OXYGEN SATURATION: 98 %

## 2019-06-29 PROCEDURE — 99213 OFFICE O/P EST LOW 20 MIN: CPT

## 2019-06-29 NOTE — PHYSICAL EXAM
[No Acute Distress] : no acute distress [Well Nourished] : well nourished [Well Developed] : well developed [Well-Appearing] : well-appearing [Speech Grossly Normal] : speech grossly normal [Normal Voice/Communication] : normal voice/communication [Memory Grossly Normal] : memory grossly normal [Normal Affect] : the affect was normal [Normal Mood] : the mood was normal [Normal Insight/Judgement] : insight and judgment were intact [de-identified] : tiny bite bill on left lateral leg with swelling, erythema and increased warmth

## 2019-06-29 NOTE — PLAN
[FreeTextEntry1] : she was advised to stop the antibiotic ointment and to take cephalexin with food as prescribed, apply the hydrocortisone cream tid and apply cool compresses and elevate the leg and call for any medication side effects or worsening symptoms.  I renewed other meds and checked I-STOP, ref #[id number], she will follow up in september and may call for medication renewals when needed

## 2019-06-29 NOTE — REVIEW OF SYSTEMS
[Itching] : Itching [Skin Rash] : skin rash [Headache] : headache [Anxiety] : anxiety [Negative] : Heme/Lymph

## 2019-06-29 NOTE — HISTORY OF PRESENT ILLNESS
[FreeTextEntry1] : Patient present for med check\par  [de-identified] : Pt presents for renewals of diazepam and fioricet.  She donated blood on 6/17.  She was at the beach last night and today shehas redness and itching of the left leg with a little swelling.  She applied antibiotic ointment on it.  She diazepam and fioricet are working well for her headaches and anxiety and she denies any side effects from them.

## 2019-07-06 ENCOUNTER — CLINICAL ADVICE (OUTPATIENT)
Age: 74
End: 2019-07-06

## 2019-09-04 DIAGNOSIS — M71.9 BURSOPATHY, UNSPECIFIED: ICD-10-CM

## 2019-09-04 DIAGNOSIS — M15.9 POLYOSTEOARTHRITIS, UNSPECIFIED: ICD-10-CM

## 2019-09-06 ENCOUNTER — RESULT REVIEW (OUTPATIENT)
Age: 74
End: 2019-09-06

## 2019-09-09 ENCOUNTER — APPOINTMENT (OUTPATIENT)
Dept: FAMILY MEDICINE | Facility: CLINIC | Age: 74
End: 2019-09-09
Payer: MEDICARE

## 2019-09-09 VITALS
RESPIRATION RATE: 14 BRPM | SYSTOLIC BLOOD PRESSURE: 120 MMHG | WEIGHT: 181 LBS | DIASTOLIC BLOOD PRESSURE: 88 MMHG | HEIGHT: 66 IN | HEART RATE: 79 BPM | BODY MASS INDEX: 29.09 KG/M2 | OXYGEN SATURATION: 98 %

## 2019-09-09 DIAGNOSIS — L03.116 CELLULITIS OF LEFT LOWER LIMB: ICD-10-CM

## 2019-09-09 PROCEDURE — 99213 OFFICE O/P EST LOW 20 MIN: CPT | Mod: 25

## 2019-09-09 PROCEDURE — 90653 IIV ADJUVANT VACCINE IM: CPT

## 2019-09-09 PROCEDURE — G0008: CPT

## 2019-09-09 RX ORDER — CEPHALEXIN 250 MG/1
250 TABLET ORAL
Qty: 21 | Refills: 0 | Status: DISCONTINUED | COMMUNITY
Start: 2019-06-29 | End: 2019-09-09

## 2019-09-09 RX ORDER — HYDROCORTISONE 25 MG/G
2.5 CREAM TOPICAL
Qty: 1 | Refills: 2 | Status: DISCONTINUED | COMMUNITY
Start: 2019-06-29 | End: 2019-09-09

## 2019-09-09 NOTE — PHYSICAL EXAM
[No Acute Distress] : no acute distress [Well Developed] : well developed [Well Nourished] : well nourished [No Respiratory Distress] : no respiratory distress  [Well-Appearing] : well-appearing [Normal Voice/Communication] : normal voice/communication [Clear to Auscultation] : lungs were clear to auscultation bilaterally [No Accessory Muscle Use] : no accessory muscle use [Normal Rate] : normal rate  [Regular Rhythm] : with a regular rhythm [Normal S1, S2] : normal S1 and S2 [Memory Grossly Normal] : memory grossly normal [Normal Affect] : the affect was normal [Normal Insight/Judgement] : insight and judgment were intact [de-identified] : she is upset and tearful

## 2019-09-09 NOTE — REVIEW OF SYSTEMS
[Joint Pain] : joint pain [Fatigue] : fatigue [Muscle Pain] : muscle pain [Headache] : headache [Negative] : Heme/Lymph

## 2019-09-09 NOTE — PLAN
[FreeTextEntry1] : flu vaccine was given, labs were reviewed, CRP and ESR were ordered and she will go for the TSH that Dr Hurtado ordered, records will be given to her to try to obtain the orthopedic consultation and I advised her that she may need to see another neurologist also, she was given emotional support and will follow up in 3 months or sooner prn

## 2019-09-09 NOTE — HISTORY OF PRESENT ILLNESS
[FreeTextEntry1] : Patient present for 3 month follow up \par \par \par  [de-identified] : She followed up with Dr Gonzalez for the pain who advised her that is was mechanical and there was nothing else she could give her except for ativan and to try accupuncture.  She saw Dr Grullon on 9/3 who advised her that she can see another orthopedic for a second opinion.  She wants to schedule an appointment with Dr Richter but needs records forwarded to him.  She has an upcoming appointment with Dr Hurtado.  She is taking the fioricet regularly throughout the day and during the night for pain relief with the hip and her headaches.  She has taken several other medications in the past for the headaches and hip pain and had side effects to them all.  She is very upset and frustrated.

## 2019-09-13 ENCOUNTER — RESULT REVIEW (OUTPATIENT)
Age: 74
End: 2019-09-13

## 2019-10-28 ENCOUNTER — RX RENEWAL (OUTPATIENT)
Age: 74
End: 2019-10-28

## 2019-12-07 ENCOUNTER — RESULT REVIEW (OUTPATIENT)
Age: 74
End: 2019-12-07

## 2019-12-09 ENCOUNTER — APPOINTMENT (OUTPATIENT)
Dept: FAMILY MEDICINE | Facility: CLINIC | Age: 74
End: 2019-12-09
Payer: MEDICARE

## 2019-12-09 VITALS — SYSTOLIC BLOOD PRESSURE: 130 MMHG | DIASTOLIC BLOOD PRESSURE: 78 MMHG

## 2019-12-09 VITALS
DIASTOLIC BLOOD PRESSURE: 80 MMHG | BODY MASS INDEX: 29.41 KG/M2 | SYSTOLIC BLOOD PRESSURE: 120 MMHG | WEIGHT: 183 LBS | HEART RATE: 69 BPM | HEIGHT: 66 IN | RESPIRATION RATE: 14 BRPM | OXYGEN SATURATION: 96 %

## 2019-12-09 PROCEDURE — 99213 OFFICE O/P EST LOW 20 MIN: CPT

## 2019-12-09 NOTE — PLAN
[FreeTextEntry1] : labs were reviewed, she will continue current meds and healthy diet, I renewed her meds and checked I-STOP, she will schedule a neurology consultation and follow up in 3 months and check labs prior, she is interested in receiving Shingrix when it is available

## 2019-12-09 NOTE — HISTORY OF PRESENT ILLNESS
[FreeTextEntry1] : Patient present for 3 month follow up \par \par \par  [de-identified] : She donated blood on 10/19.  She decided not to have the HgA1C done as it wasn't covered.  She took the nexium for 1 month and is scheduled for an upper endoscopy on 12/19.  She would like to continue taking it for a while longer.  She saw Dr Richter who advised her that the hip is fine and "to live with the pain".  The pain occurs mostly with sitting for long periods of time and when going from a sitting to a standing position.  She plans to find a new neurologist.  She would like to renew diazepam that she takes sometimes at night for sleep and the butalbital which is controlling her headaches.  She is afraid to try any new medication due to potential side effects.  She has to care for her ill brother and doesn't want any medication that may affect her functioning.

## 2019-12-09 NOTE — REVIEW OF SYSTEMS
[Joint Pain] : joint pain [Joint Stiffness] : joint stiffness [Headache] : headache [Anxiety] : anxiety [Insomnia] : insomnia [Negative] : Heme/Lymph [Depression] : no depression [Suicidal] : not suicidal

## 2019-12-09 NOTE — PHYSICAL EXAM
[Well Nourished] : well nourished [No Acute Distress] : no acute distress [Well Developed] : well developed [Normal Voice/Communication] : normal voice/communication [Well-Appearing] : well-appearing [No Respiratory Distress] : no respiratory distress  [No Accessory Muscle Use] : no accessory muscle use [Clear to Auscultation] : lungs were clear to auscultation bilaterally [Normal Rate] : normal rate  [Normal S1, S2] : normal S1 and S2 [No Carotid Bruits] : no carotid bruits [Regular Rhythm] : with a regular rhythm [Speech Grossly Normal] : speech grossly normal [Coordination Grossly Intact] : coordination grossly intact [Normal Affect] : the affect was normal [Memory Grossly Normal] : memory grossly normal [Normal Mood] : the mood was normal [Normal Insight/Judgement] : insight and judgment were intact [de-identified] : mild edema of LLE

## 2020-01-10 ENCOUNTER — MEDICATION RENEWAL (OUTPATIENT)
Age: 75
End: 2020-01-10

## 2020-03-18 ENCOUNTER — APPOINTMENT (OUTPATIENT)
Dept: FAMILY MEDICINE | Facility: CLINIC | Age: 75
End: 2020-03-18
Payer: MEDICARE

## 2020-03-18 VITALS
BODY MASS INDEX: 28.45 KG/M2 | HEIGHT: 66 IN | OXYGEN SATURATION: 97 % | HEART RATE: 71 BPM | DIASTOLIC BLOOD PRESSURE: 74 MMHG | RESPIRATION RATE: 14 BRPM | WEIGHT: 177 LBS | SYSTOLIC BLOOD PRESSURE: 126 MMHG

## 2020-03-18 DIAGNOSIS — Z87.19 PERSONAL HISTORY OF OTHER DISEASES OF THE DIGESTIVE SYSTEM: ICD-10-CM

## 2020-03-18 PROCEDURE — 99213 OFFICE O/P EST LOW 20 MIN: CPT

## 2020-03-18 NOTE — REVIEW OF SYSTEMS
[Headache] : headache [Anxiety] : anxiety [Negative] : Heme/Lymph [Suicidal] : not suicidal [Depression] : no depression [FreeTextEntry9] : groin pain

## 2020-03-18 NOTE — PLAN
[FreeTextEntry1] : labs were reviewed, she will continue to follow a healthy diet and will follow up with Dr Hurtado in april, she will continue to follow up with the neurologist as scheduled and if the testing doesn't reveal any pinched nerve will follow up with Dr Grullon, she will follow up with me in 3 months and check labs prior, her medications were renewed and I-STOP was checked

## 2020-03-18 NOTE — HISTORY OF PRESENT ILLNESS
[FreeTextEntry1] : Pt presents for 3 month follow up for blood pressure [de-identified] : She has been on Weight Watchers and has lost 12 pounds.  She had a discussion with Dr Hurtado and went off of crestor in january.  She donated blood on 3/3 and her BP was 160/110.  She saw Dr Stone who ordered a MRI of the thigh and ordered PT for her neck but her appointments were cancelled due to the corona virus.  She was started on amitriptylene 25 mg at  in an effort to wean off of the fioricet, she was able to decrease the fioricet to 1 less pill daily.  She is scheduled for nerve conduction tests in april.  She still has the same pain in the left groin when changing positions.  She would like to renew the diazepam that she is taking for the anxiety as needed, she still has some left and denies any side effects.

## 2020-03-18 NOTE — PHYSICAL EXAM
[No Acute Distress] : no acute distress [Well Nourished] : well nourished [Well Developed] : well developed [Well-Appearing] : well-appearing [Normal Voice/Communication] : normal voice/communication [No Respiratory Distress] : no respiratory distress  [No Accessory Muscle Use] : no accessory muscle use [Clear to Auscultation] : lungs were clear to auscultation bilaterally [Normal Rate] : normal rate  [Regular Rhythm] : with a regular rhythm [Normal S1, S2] : normal S1 and S2 [No Carotid Bruits] : no carotid bruits [Coordination Grossly Intact] : coordination grossly intact [Speech Grossly Normal] : speech grossly normal [Memory Grossly Normal] : memory grossly normal [Normal Mood] : the mood was normal [Normal Insight/Judgement] : insight and judgment were intact

## 2020-06-18 ENCOUNTER — APPOINTMENT (OUTPATIENT)
Dept: FAMILY MEDICINE | Facility: CLINIC | Age: 75
End: 2020-06-18
Payer: MEDICARE

## 2020-06-18 VITALS
BODY MASS INDEX: 29.57 KG/M2 | DIASTOLIC BLOOD PRESSURE: 78 MMHG | OXYGEN SATURATION: 97 % | HEART RATE: 71 BPM | TEMPERATURE: 98.7 F | WEIGHT: 184 LBS | RESPIRATION RATE: 14 BRPM | HEIGHT: 66 IN | SYSTOLIC BLOOD PRESSURE: 138 MMHG

## 2020-06-18 VITALS — DIASTOLIC BLOOD PRESSURE: 70 MMHG | SYSTOLIC BLOOD PRESSURE: 126 MMHG

## 2020-06-18 PROCEDURE — 99214 OFFICE O/P EST MOD 30 MIN: CPT

## 2020-06-18 RX ORDER — AMITRIPTYLINE HYDROCHLORIDE 25 MG/1
25 TABLET, FILM COATED ORAL
Refills: 0 | Status: DISCONTINUED | COMMUNITY
End: 2020-06-18

## 2020-06-18 RX ORDER — CHLORHEXIDINE GLUCONATE 4 %
LIQUID (ML) TOPICAL
Refills: 0 | Status: DISCONTINUED | COMMUNITY
End: 2020-06-18

## 2020-06-18 RX ORDER — EZETIMIBE 10 MG/1
10 TABLET ORAL
Qty: 90 | Refills: 3 | Status: DISCONTINUED | COMMUNITY
End: 2020-06-18

## 2020-06-18 NOTE — HISTORY OF PRESENT ILLNESS
[FreeTextEntry1] : pt presents for blood pressure check  [de-identified] : She had to stop the amitriptylene due to insomnia, urinary retention and constipation and she gained weight.  She has been off of it since about 3 weeks ago.  She was feeling more anxious.  The headaches had improved somewhat with the amitriptylene but they worsened when she stopped it.  She has been trying to limit the quantity of fioricet she takes.  She has had abdominal distress and is waiting for a call back from Dr Queen.  She is on a different generic form of Nexium and feels it hasn't been working as well.  She had a EMG from Dr Stone on Friday and was told everything was normal and won't see her for 3 months.  She is considering starting her on Topamax.  She would like to start hemp oil.  She had a teleconference with Dr Hurtado in April, he stopped the zetia and put her back on rosuvastatin 5 mg daily and she will have a carotid artery ultrasound in July.  She saw Dr Sylvester and will have a follow up CT in 1 year.  She donated blood on 4/30.  She would like to renew diazepam that she takes as needed for anxiety with good relief of symptoms.  Her left hip pain did improve about 70% with the amitriptylene and now is much worse

## 2020-06-18 NOTE — PLAN
[FreeTextEntry1] : she will call me back for an alternative PPI if she doesn't hear back from Dr Queen, she may start the hemp oil, after checking I-STOP the diazepam was renewed, she was given emotional support, labs were reviewed, she will continue current doses of metoprolol and rosuvastatin and follow up for a wellness visit

## 2020-06-18 NOTE — PHYSICAL EXAM
[No Acute Distress] : no acute distress [Well Nourished] : well nourished [Normal Voice/Communication] : normal voice/communication [Well Developed] : well developed [Well-Appearing] : well-appearing [Supple] : supple [Normal Sclera/Conjunctiva] : normal sclera/conjunctiva [No Respiratory Distress] : no respiratory distress  [No Accessory Muscle Use] : no accessory muscle use [Clear to Auscultation] : lungs were clear to auscultation bilaterally [Normal Rate] : normal rate  [Regular Rhythm] : with a regular rhythm [No Murmur] : no murmur heard [Normal S1, S2] : normal S1 and S2 [No Edema] : there was no peripheral edema [No Carotid Bruits] : no carotid bruits [Soft] : abdomen soft [Non-distended] : non-distended [Normal Bowel Sounds] : normal bowel sounds [No Rash] : no rash [Coordination Grossly Intact] : coordination grossly intact [No Focal Deficits] : no focal deficits [Speech Grossly Normal] : speech grossly normal [Memory Grossly Normal] : memory grossly normal [Normal Affect] : the affect was normal [Normal Insight/Judgement] : insight and judgment were intact [de-identified] : discomfort in epigastric area [de-identified] : she appears anxious and mildly depressed

## 2020-06-18 NOTE — CURRENT MEDS
[Side Effects] :  side effects [None] : Patient does not have any barriers to medication adherence [Takes medication as prescribed] : does not take

## 2020-09-22 RX ORDER — ROSUVASTATIN CALCIUM 5 MG/1
5 TABLET, FILM COATED ORAL
Refills: 0 | Status: DISCONTINUED | COMMUNITY
End: 2020-09-22

## 2020-10-06 ENCOUNTER — APPOINTMENT (OUTPATIENT)
Dept: FAMILY MEDICINE | Facility: CLINIC | Age: 75
End: 2020-10-06
Payer: MEDICARE

## 2020-10-06 VITALS
TEMPERATURE: 97.3 F | BODY MASS INDEX: 30.22 KG/M2 | OXYGEN SATURATION: 98 % | RESPIRATION RATE: 12 BRPM | SYSTOLIC BLOOD PRESSURE: 130 MMHG | HEART RATE: 60 BPM | HEIGHT: 66 IN | WEIGHT: 188 LBS | DIASTOLIC BLOOD PRESSURE: 80 MMHG

## 2020-10-06 DIAGNOSIS — Z87.19 PERSONAL HISTORY OF OTHER DISEASES OF THE DIGESTIVE SYSTEM: ICD-10-CM

## 2020-10-06 PROCEDURE — G0439: CPT

## 2020-10-06 PROCEDURE — G0008: CPT

## 2020-10-06 PROCEDURE — 90662 IIV NO PRSV INCREASED AG IM: CPT

## 2020-10-06 NOTE — HISTORY OF PRESENT ILLNESS
[de-identified] : She is still uncomfortable with the left hip pain and will start PT.  She had to stop the rosuvastatin due to stomach upset and muscle aches and she will meet with Dr Hurtado re:  her cholesterol.  She tried the hemp oil and it didn't help her symptoms and bothered her stomach.  She has been battling constipation.  She is taking the fioricet for the pain.

## 2020-10-06 NOTE — HEALTH RISK ASSESSMENT
[Patient reported mammogram was normal] : Patient reported mammogram was normal [Good] : ~his/her~  mood as  good [No] : In the past 12 months have you used drugs other than those required for medical reasons? No [No falls in past year] : Patient reported no falls in the past year [HIV test declined] : HIV test declined [Hepatitis C test declined] : Hepatitis C test declined [None] : None [With Significant Other] : lives with significant other [Retired] : retired [] :  [Feels Safe at Home] : Feels safe at home [Fully functional (bathing, dressing, toileting, transferring, walking, feeding)] : Fully functional (bathing, dressing, toileting, transferring, walking, feeding) [Fully functional (using the telephone, shopping, preparing meals, housekeeping, doing laundry, using] : Fully functional and needs no help or supervision to perform IADLs (using the telephone, shopping, preparing meals, housekeeping, doing laundry, using transportation, managing medications and managing finances) [Reports changes in vision] : Reports changes in vision [Smoke Detector] : smoke detector [Carbon Monoxide Detector] : carbon monoxide detector [Guns at Home] : guns at home [Safety elements used in home] : safety elements used in home [Seat Belt] :  uses seat belt [Sunscreen] : uses sunscreen [With Patient/Caregiver] : With Patient/Caregiver [Designated Healthcare Proxy] : Designated healthcare proxy [Name: ___] : Health Care Proxy's Name: [unfilled]  [Relationship: ___] : Relationship: [unfilled] [Aggressive treatment] : aggressive treatment [FreeTextEntry1] : seee HPI [] : No [de-identified] : none [de-identified] : Dr Stone (Neuro), Dr Hurtado (Cardio), Rosemarie Flores (GYN), Dr Queen (GI), Xander Barragan (PT) [YearQuit] : 1971 [de-identified] : tries to stay active [de-identified] : healthy [Change in mental status noted] : No change in mental status noted [Language] : denies difficulty with language [Behavior] : denies difficulty with behavior [Learning/Retaining New Information] : denies difficulty learning/retaining new information [Handling Complex Tasks] : denies difficulty handling complex tasks [Reasoning] : denies difficulty with reasoning [Spatial Ability and Orientation] : denies difficulty with spatial ability and orientation [Reports changes in hearing] : Reports no changes in hearing [Reports changes in dental health] : Reports no changes in dental health [Travel to Developing Areas] : does not  travel to developing areas [TB Exposure] : is not being exposed to tuberculosis [Caregiver Concerns] : does not have caregiver concerns [MammogramDate] : 07/20 [BoneDensityDate] : 07/20 [BoneDensityComments] : she was advised that it was stable [de-identified] : seeing the eye doctor tomorrow [de-identified] : securely locked up [AdvancecareDate] : 10/20

## 2020-10-06 NOTE — PLAN
[FreeTextEntry1] : lab report was reviewed, she will speak with Dr Hurtado re:  taking pravastatin, she will continue healthy lifestyle, flu vaccine was given, controlled substances agreement form was completed, I-STOP was checked, medications were renewed, she will follow up in 3 months and check labs prior

## 2020-10-06 NOTE — PHYSICAL EXAM
[No Acute Distress] : no acute distress [Well Nourished] : well nourished [Well Developed] : well developed [Well-Appearing] : well-appearing [Normal Voice/Communication] : normal voice/communication [Supple] : supple [No Respiratory Distress] : no respiratory distress  [No Accessory Muscle Use] : no accessory muscle use [Clear to Auscultation] : lungs were clear to auscultation bilaterally [Normal Rate] : normal rate  [Regular Rhythm] : with a regular rhythm [Normal S1, S2] : normal S1 and S2 [Soft] : abdomen soft [Non Tender] : non-tender [Non-distended] : non-distended [No HSM] : no HSM [Normal Bowel Sounds] : normal bowel sounds [Coordination Grossly Intact] : coordination grossly intact [Speech Grossly Normal] : speech grossly normal [Memory Grossly Normal] : memory grossly normal [Normal Affect] : the affect was normal [Normal Mood] : the mood was normal [Normal Insight/Judgement] : insight and judgment were intact [de-identified] : mild edema of LLE

## 2020-10-06 NOTE — REVIEW OF SYSTEMS
[Abdominal Pain] : abdominal pain [Constipation] : constipation [Joint Pain] : joint pain [Joint Stiffness] : joint stiffness [Muscle Pain] : muscle pain [Headache] : headache [Negative] : Heme/Lymph [Lower Ext Edema] : lower extremity edema

## 2020-10-22 DIAGNOSIS — G89.29 CERVICALGIA: ICD-10-CM

## 2020-10-22 DIAGNOSIS — M54.2 CERVICALGIA: ICD-10-CM

## 2021-01-07 ENCOUNTER — APPOINTMENT (OUTPATIENT)
Dept: FAMILY MEDICINE | Facility: CLINIC | Age: 76
End: 2021-01-07
Payer: MEDICARE

## 2021-01-07 VITALS
OXYGEN SATURATION: 95 % | HEIGHT: 66 IN | SYSTOLIC BLOOD PRESSURE: 120 MMHG | HEART RATE: 57 BPM | RESPIRATION RATE: 14 BRPM | DIASTOLIC BLOOD PRESSURE: 80 MMHG | TEMPERATURE: 97.3 F | BODY MASS INDEX: 30.37 KG/M2 | WEIGHT: 189 LBS

## 2021-01-07 VITALS — SYSTOLIC BLOOD PRESSURE: 120 MMHG | DIASTOLIC BLOOD PRESSURE: 80 MMHG

## 2021-01-07 DIAGNOSIS — G89.29 PAIN IN LEFT HIP: ICD-10-CM

## 2021-01-07 DIAGNOSIS — M25.552 PAIN IN LEFT HIP: ICD-10-CM

## 2021-01-07 PROCEDURE — 99214 OFFICE O/P EST MOD 30 MIN: CPT

## 2021-01-07 RX ORDER — ESOMEPRAZOLE MAGNESIUM 40 MG/1
40 CAPSULE, DELAYED RELEASE ORAL
Qty: 30 | Refills: 0 | Status: DISCONTINUED | COMMUNITY
Start: 2019-10-28 | End: 2021-01-07

## 2021-01-07 RX ORDER — EZETIMIBE 10 MG/1
10 TABLET ORAL
Qty: 90 | Refills: 3 | Status: DISCONTINUED | COMMUNITY
Start: 1900-01-01 | End: 2021-01-07

## 2021-01-07 NOTE — PLAN
[FreeTextEntry1] : 75yr old female history HTN, HLD and chronic leg pain presented for follow up. \par \par Patient reported not taking Zetia will discontinue and start patient on Simvastatin 10mg PO QD. Recent lipid panel showed elevation in LDL and total cholesterol.  CBC, CMP, lipids and TSH were all reviewed with her.  Advised DASH diet and continued exercise as tolerated. \par  Her migraines are stable, I-STOP was checked and bulatbital renewed, she is taking less diazepam\par BP well controlled with current medication. \par \par Left groin pain likely muscle strain. Will continue to monitor as previous extensive work up as been normal. Advised weight loss and stretching to improve pain at site. \par \par RTO in 3 months for f/u with statin tolerance.

## 2021-01-07 NOTE — HISTORY OF PRESENT ILLNESS
[FreeTextEntry1] : patient presents for medication renewal\par  [de-identified] : States she is no longer taking Crestor since October due to intolerance. She is interested in starting simvastatin for better management.  Patient is currently taking Nexium and is continued to have abdominal pain. She reports no longer taking probiotic. Admits to recent hair loss and continued left groin pain with radiation down leg. Denies paresthesia and unsteady gait.

## 2021-01-07 NOTE — REVIEW OF SYSTEMS
[Abdominal Pain] : abdominal pain [Muscle Pain] : muscle pain [Negative] : Psychiatric [Lower Ext Edema] : lower extremity edema [FreeTextEntry9] : left groin pain with radiation down leg

## 2021-01-07 NOTE — PHYSICAL EXAM
[No Acute Distress] : no acute distress [Well Nourished] : well nourished [Well Developed] : well developed [Well-Appearing] : well-appearing [Normal Sclera/Conjunctiva] : normal sclera/conjunctiva [PERRL] : pupils equal round and reactive to light [EOMI] : extraocular movements intact [Normal Outer Ear/Nose] : the outer ears and nose were normal in appearance [No JVD] : no jugular venous distention [No Respiratory Distress] : no respiratory distress  [No Accessory Muscle Use] : no accessory muscle use [Clear to Auscultation] : lungs were clear to auscultation bilaterally [Normal Rate] : normal rate  [Regular Rhythm] : with a regular rhythm [Normal S1, S2] : normal S1 and S2 [No Murmur] : no murmur heard [No Carotid Bruits] : no carotid bruits [No Edema] : there was no peripheral edema [No Pitting Edema] : no pitting edema present [Soft] : abdomen soft [Non Tender] : non-tender [No HSM] : no HSM [No Joint Swelling] : no joint swelling [Grossly Normal Strength/Tone] : grossly normal strength/tone [Normal Station and Gait] : the gait and station were normal [Coordination Grossly Intact] : coordination grossly intact [No Focal Deficits] : no focal deficits [Normal Gait] : normal gait [Normal Affect] : the affect was normal [Normal Insight/Judgement] : insight and judgment were intact [Speech Grossly Normal] : speech grossly normal [Normal Mood] : the mood was normal [de-identified] : palpable left femoral pulse

## 2021-04-08 ENCOUNTER — RX RENEWAL (OUTPATIENT)
Age: 76
End: 2021-04-08

## 2021-04-08 ENCOUNTER — APPOINTMENT (OUTPATIENT)
Dept: FAMILY MEDICINE | Facility: CLINIC | Age: 76
End: 2021-04-08
Payer: MEDICARE

## 2021-04-08 VITALS — DIASTOLIC BLOOD PRESSURE: 76 MMHG | SYSTOLIC BLOOD PRESSURE: 126 MMHG

## 2021-04-08 VITALS
BODY MASS INDEX: 30.22 KG/M2 | HEART RATE: 78 BPM | DIASTOLIC BLOOD PRESSURE: 70 MMHG | RESPIRATION RATE: 14 BRPM | TEMPERATURE: 98 F | SYSTOLIC BLOOD PRESSURE: 120 MMHG | HEIGHT: 66 IN | OXYGEN SATURATION: 96 % | WEIGHT: 188 LBS

## 2021-04-08 DIAGNOSIS — T46.6X5A MYOSITIS, UNSPECIFIED: ICD-10-CM

## 2021-04-08 DIAGNOSIS — M60.9 MYOSITIS, UNSPECIFIED: ICD-10-CM

## 2021-04-08 PROCEDURE — 99214 OFFICE O/P EST MOD 30 MIN: CPT

## 2021-04-08 RX ORDER — SIMVASTATIN 10 MG/1
10 TABLET, FILM COATED ORAL
Qty: 90 | Refills: 3 | Status: DISCONTINUED | COMMUNITY
Start: 2021-01-07 | End: 2021-04-08

## 2021-04-08 NOTE — REVIEW OF SYSTEMS
[Vision Problems] : vision problems [Joint Pain] : joint pain [Joint Stiffness] : joint stiffness [Muscle Pain] : muscle pain [Hair Changes] : hair changes [Headache] : headache [Anxiety] : anxiety [Negative] : Heme/Lymph [Suicidal] : not suicidal [Depression] : no depression

## 2021-04-08 NOTE — HISTORY OF PRESENT ILLNESS
[FreeTextEntry1] : Patient presents for 3 month check and medication renewal  [de-identified] : She experienced an aura in one eye on 3/4 and it happened again the next night and the night after that she had it in both eyes and saw the ophthalmologist, Dr Mancuso, the eye exam was normal and she was advised to see the neurologist, Dr Stone, she ordered MRI of the brain and MRA of the brain and neck.  She is following the Weight Watchers program and following a healthier diet.  She has been losing her hair.  She saw the dermatologist, Dr Lees, who advised her to try Rogaine but she didn't want to commit to the treatment.  Since starting the simvastatin she has had worsening pain in her muscles.  She wants to come off of it and she also wants to change her BP medication.  She has been able to decrease use of diazepam and fioricet but would like renewals of both today

## 2021-04-08 NOTE — PHYSICAL EXAM
[No Acute Distress] : no acute distress [Well Nourished] : well nourished [Well Developed] : well developed [Well-Appearing] : well-appearing [Normal Voice/Communication] : normal voice/communication [Supple] : supple [No Respiratory Distress] : no respiratory distress  [No Accessory Muscle Use] : no accessory muscle use [Clear to Auscultation] : lungs were clear to auscultation bilaterally [Normal Rate] : normal rate  [Regular Rhythm] : with a regular rhythm [Normal S1, S2] : normal S1 and S2 [No Murmur] : no murmur heard [Coordination Grossly Intact] : coordination grossly intact [Speech Grossly Normal] : speech grossly normal

## 2021-04-08 NOTE — PLAN
[FreeTextEntry1] : the lab report including CMP and lipid panel, the cardiology consultation, MRI of brain and MRA of head and neck reports were all reviewed with her, she will stop the simvastatin and start red yeast rice, I decreased the metoprolol ER to 25 mg bid, she will follow up in 3 months and CBC was ordered to further evaluate the overall health, CMP and lipid panel to further evaluate the cardiovascular status, TSH to further evaluate the hair loss, if the hair loss persists she will be weaned off of metoprolol and started on losartan but the metoprolol has helped in the past with her palpitations, I-STOP was checked and diazepam and fioricet renewed and she will continue to use them only for severe symptoms\par

## 2021-04-08 NOTE — CURRENT MEDS
[Takes medication as prescribed] : takes [None] : Patient does not have any barriers to medication adherence [Yes] : Reviewed medication list for presence of high-risk medications. [Benzodiazepines] : benzodiazepines [Sedatives] : sedatives

## 2021-06-07 ENCOUNTER — APPOINTMENT (OUTPATIENT)
Dept: PULMONOLOGY | Facility: CLINIC | Age: 76
End: 2021-06-07
Payer: MEDICARE

## 2021-06-07 VITALS
HEART RATE: 67 BPM | OXYGEN SATURATION: 99 % | SYSTOLIC BLOOD PRESSURE: 132 MMHG | TEMPERATURE: 97.1 F | DIASTOLIC BLOOD PRESSURE: 84 MMHG

## 2021-06-07 PROCEDURE — 99214 OFFICE O/P EST MOD 30 MIN: CPT | Mod: 25

## 2021-06-07 PROCEDURE — ZZZZZ: CPT

## 2021-06-07 PROCEDURE — 94727 GAS DIL/WSHOT DETER LNG VOL: CPT

## 2021-06-07 PROCEDURE — 94729 DIFFUSING CAPACITY: CPT

## 2021-06-07 PROCEDURE — 94010 BREATHING CAPACITY TEST: CPT

## 2021-06-07 RX ORDER — METOPROLOL SUCCINATE 25 MG/1
25 TABLET, EXTENDED RELEASE ORAL TWICE DAILY
Qty: 180 | Refills: 3 | Status: DISCONTINUED | COMMUNITY
Start: 2018-11-26 | End: 2021-06-07

## 2021-06-07 RX ORDER — DIAZEPAM 5 MG/1
5 TABLET ORAL 3 TIMES DAILY
Qty: 90 | Refills: 0 | Status: DISCONTINUED | COMMUNITY
Start: 2018-01-18 | End: 2021-06-07

## 2021-06-07 RX ORDER — DICLOFENAC EPOLAMINE 0.01 G/1
1.3 SYSTEM TOPICAL TWICE DAILY
Qty: 60 | Refills: 3 | Status: DISCONTINUED | COMMUNITY
Start: 2020-10-06 | End: 2021-06-07

## 2021-06-07 RX ORDER — DIPHENHYDRAMINE HCL 2 %
25 CREAM (GRAM) TOPICAL
Refills: 0 | Status: DISCONTINUED | COMMUNITY
End: 2021-06-07

## 2021-06-07 RX ORDER — AMOXICILLIN 500 MG/1
500 TABLET, FILM COATED ORAL
Qty: 12 | Refills: 0 | Status: DISCONTINUED | COMMUNITY
Start: 2019-05-24 | End: 2021-06-07

## 2021-06-07 NOTE — HISTORY OF PRESENT ILLNESS
[Former] : former [Never] : never [TextBox_4] : 76 female  for fu re nodule\par overall feels good \par no sob sl cough with lying down  no fever  and co nasal congestion\par no wheeze no  dyspnea on exertion no nite awakening\par some wt gain and some  dyspnea with yard work\par dx gerd and no further issue\par no wt loss no chest pain no hemoptysis \par quit tobacco 45 ago [TextBox_11] : .1 [TextBox_13] : 10 [YearQuit] : 1976

## 2021-06-07 NOTE — DISCUSSION/SUMMARY
[FreeTextEntry1] : extensive discussion with pt re yoselyn  ground glass nodule\par reviewed multiple ct chest to 2010 and some reveal nodules 7mm all the way to 14mm\par I discussed this very nonspecific and most ct chest done at same place say NO  change\par will repeat ct chest in 1 year and if no change no further ct chest \par pt with ch cough and seems to be realted to position and likely secondary to gerd\par I rec fu gi and aggressive treatment for same\par The patient understands and agrees with plan of care.\par Today's office visit encompassed 32 minutes. I conducted an extensive history ,physical exam and reviewed diagnosis and treatment options  including diagnostic tests,radiologic studies including  cat scans  and the use of prescription medication.

## 2021-06-10 ENCOUNTER — NON-APPOINTMENT (OUTPATIENT)
Age: 76
End: 2021-06-10

## 2021-07-09 ENCOUNTER — APPOINTMENT (OUTPATIENT)
Dept: FAMILY MEDICINE | Facility: CLINIC | Age: 76
End: 2021-07-09
Payer: MEDICARE

## 2021-07-09 VITALS — TEMPERATURE: 97.5 F

## 2021-07-09 VITALS
HEIGHT: 66 IN | DIASTOLIC BLOOD PRESSURE: 80 MMHG | HEART RATE: 74 BPM | RESPIRATION RATE: 14 BRPM | BODY MASS INDEX: 30.37 KG/M2 | SYSTOLIC BLOOD PRESSURE: 118 MMHG | WEIGHT: 189 LBS | OXYGEN SATURATION: 98 %

## 2021-07-09 VITALS — DIASTOLIC BLOOD PRESSURE: 70 MMHG | SYSTOLIC BLOOD PRESSURE: 128 MMHG

## 2021-07-09 DIAGNOSIS — R53.83 OTHER FATIGUE: ICD-10-CM

## 2021-07-09 PROCEDURE — 99214 OFFICE O/P EST MOD 30 MIN: CPT

## 2021-07-09 RX ORDER — ELECTROLYTES/DEXTROSE
SOLUTION, ORAL ORAL
Refills: 0 | Status: DISCONTINUED | COMMUNITY
End: 2021-07-09

## 2021-07-09 NOTE — PLAN
[FreeTextEntry1] : the pulmonary consultation, CT report, lab report including CMP and lipid panel were reviewed, she will start the cyclobenzaprine at  for the neck pain, she will start PT, she will continue healthy diet and follow up in October for a well visit and will go for lab testing prior

## 2021-07-09 NOTE — HISTORY OF PRESENT ILLNESS
[FreeTextEntry1] : pt presents for 3 month follow up  [de-identified] : She is tolerating the rosuvastatin so far.  She is having pain in the back of her neck that is radiating into both arms.  She still isn't sleeping well.  She increased the metoprolol back to 37.5 mg bid as she had more palpitations on the lower dose.  She is on a new form of a hair skin and nail vitamin that is more potent.  She is averaging 3 fioricet daily and she is needing less diazepam.

## 2021-07-09 NOTE — PHYSICAL EXAM
[No Acute Distress] : no acute distress [Well Nourished] : well nourished [Well Developed] : well developed [Well-Appearing] : well-appearing [Normal Voice/Communication] : normal voice/communication [Normal Sclera/Conjunctiva] : normal sclera/conjunctiva [Supple] : supple [No Respiratory Distress] : no respiratory distress  [No Accessory Muscle Use] : no accessory muscle use [Clear to Auscultation] : lungs were clear to auscultation bilaterally [Normal Rate] : normal rate  [Regular Rhythm] : with a regular rhythm [Normal S1, S2] : normal S1 and S2 [No Murmur] : no murmur heard [Coordination Grossly Intact] : coordination grossly intact [Normal Mood] : the mood was normal [de-identified] : tenderness on palpation of left lateral neck, decreased ROM

## 2021-07-09 NOTE — REVIEW OF SYSTEMS
[Fatigue] : fatigue [Joint Pain] : joint pain [Joint Stiffness] : joint stiffness [Muscle Pain] : muscle pain [Headache] : headache [Suicidal] : not suicidal [Insomnia] : insomnia [Anxiety] : no anxiety [Depression] : no depression [Negative] : Heme/Lymph [de-identified] : pain radiating into arms

## 2021-09-15 ENCOUNTER — NON-APPOINTMENT (OUTPATIENT)
Age: 76
End: 2021-09-15

## 2021-09-15 ENCOUNTER — APPOINTMENT (OUTPATIENT)
Dept: OPHTHALMOLOGY | Facility: CLINIC | Age: 76
End: 2021-09-15
Payer: MEDICARE

## 2021-09-15 PROCEDURE — 92014 COMPRE OPH EXAM EST PT 1/>: CPT

## 2021-10-06 ENCOUNTER — APPOINTMENT (OUTPATIENT)
Dept: FAMILY MEDICINE | Facility: CLINIC | Age: 76
End: 2021-10-06
Payer: MEDICARE

## 2021-10-06 VITALS
OXYGEN SATURATION: 99 % | BODY MASS INDEX: 30.7 KG/M2 | HEART RATE: 79 BPM | HEIGHT: 66 IN | WEIGHT: 191 LBS | SYSTOLIC BLOOD PRESSURE: 128 MMHG | DIASTOLIC BLOOD PRESSURE: 84 MMHG | RESPIRATION RATE: 14 BRPM

## 2021-10-06 VITALS — SYSTOLIC BLOOD PRESSURE: 106 MMHG | DIASTOLIC BLOOD PRESSURE: 78 MMHG

## 2021-10-06 DIAGNOSIS — L65.9 NONSCARRING HAIR LOSS, UNSPECIFIED: ICD-10-CM

## 2021-10-06 DIAGNOSIS — F41.1 GENERALIZED ANXIETY DISORDER: ICD-10-CM

## 2021-10-06 PROCEDURE — G0008: CPT

## 2021-10-06 PROCEDURE — 99214 OFFICE O/P EST MOD 30 MIN: CPT | Mod: 25

## 2021-10-06 PROCEDURE — 90662 IIV NO PRSV INCREASED AG IM: CPT

## 2021-10-06 RX ORDER — CYCLOBENZAPRINE HYDROCHLORIDE 10 MG/1
10 TABLET, FILM COATED ORAL
Qty: 30 | Refills: 2 | Status: DISCONTINUED | COMMUNITY
Start: 2021-07-09 | End: 2021-10-06

## 2021-10-06 NOTE — HISTORY OF PRESENT ILLNESS
[FreeTextEntry1] : patient presents for 3 MON follow up  [de-identified] : She is still losing her hair.  She is in PT for her neck with Dr Jolly.  She didn't get any relief from the cyclobenzaprine and stopped it.  She was in the process of selling her home but her  went into a panic over it and they halted the sale.  She is feeling very stressed over it.  She wants to see an alopecia specialist.  She saw her regular dermatologist, Dr Lees, who advised that it is likely due to medications and aging and to start minoxidil.  She would like to renew diazepam that she takes on occasion

## 2021-10-06 NOTE — PHYSICAL EXAM
[No Acute Distress] : no acute distress [Well Nourished] : well nourished [Well Developed] : well developed [Well-Appearing] : well-appearing [Normal Voice/Communication] : normal voice/communication [No Respiratory Distress] : no respiratory distress  [No Accessory Muscle Use] : no accessory muscle use [Clear to Auscultation] : lungs were clear to auscultation bilaterally [Normal Rate] : normal rate  [Regular Rhythm] : with a regular rhythm [Normal S1, S2] : normal S1 and S2 [No Carotid Bruits] : no carotid bruits [No Edema] : there was no peripheral edema [Coordination Grossly Intact] : coordination grossly intact [de-identified] : she is anxious

## 2021-10-06 NOTE — PLAN
[FreeTextEntry1] : the cardiology report was reviewed, the lab report including CMP, lipid panel and CBC was reviewed,, she will seek out an alopecia specialist but was advised there's no likely treatment for her except for minoxidil, I-STOP was checked and valium renewed, she will continue to take it sparingly, high dose flu vaccine was given and she will follow up for a wellness visit and I ordered labs to be done prior

## 2021-10-06 NOTE — REVIEW OF SYSTEMS
[Hair Changes] : hair changes [Headache] : headache [Insomnia] : insomnia [Anxiety] : anxiety [Negative] : Heme/Lymph [Suicidal] : not suicidal [Depression] : no depression

## 2022-01-14 ENCOUNTER — APPOINTMENT (OUTPATIENT)
Dept: FAMILY MEDICINE | Facility: CLINIC | Age: 77
End: 2022-01-14
Payer: MEDICARE

## 2022-01-14 VITALS
OXYGEN SATURATION: 97 % | RESPIRATION RATE: 14 BRPM | HEIGHT: 66 IN | HEART RATE: 97 BPM | WEIGHT: 190 LBS | DIASTOLIC BLOOD PRESSURE: 80 MMHG | BODY MASS INDEX: 30.53 KG/M2 | SYSTOLIC BLOOD PRESSURE: 130 MMHG | TEMPERATURE: 97.4 F

## 2022-01-14 VITALS
WEIGHT: 190 LBS | OXYGEN SATURATION: 97 % | RESPIRATION RATE: 14 BRPM | SYSTOLIC BLOOD PRESSURE: 130 MMHG | TEMPERATURE: 97.4 F | HEIGHT: 66 IN | DIASTOLIC BLOOD PRESSURE: 80 MMHG | BODY MASS INDEX: 30.53 KG/M2 | HEART RATE: 74 BPM

## 2022-01-14 DIAGNOSIS — M54.9 DORSALGIA, UNSPECIFIED: ICD-10-CM

## 2022-01-14 DIAGNOSIS — Z00.00 ENCOUNTER FOR GENERAL ADULT MEDICAL EXAMINATION W/OUT ABNORMAL FINDINGS: ICD-10-CM

## 2022-01-14 LAB
BILIRUB UR QL STRIP: NEGATIVE
GLUCOSE UR-MCNC: NEGATIVE
HCG UR QL: 0.2 EU/DL
HGB UR QL STRIP.AUTO: NORMAL
KETONES UR-MCNC: NEGATIVE
LEUKOCYTE ESTERASE UR QL STRIP: NEGATIVE
NITRITE UR QL STRIP: NEGATIVE
PH UR STRIP: 5.5
PROT UR STRIP-MCNC: NEGATIVE
SP GR UR STRIP: 1.02

## 2022-01-14 PROCEDURE — 81003 URINALYSIS AUTO W/O SCOPE: CPT | Mod: QW

## 2022-01-14 PROCEDURE — G0439: CPT

## 2022-01-14 NOTE — REVIEW OF SYSTEMS
[Fatigue] : fatigue [Lower Ext Edema] : lower extremity edema [Abdominal Pain] : abdominal pain [Constipation] : constipation [Joint Pain] : joint pain [Joint Stiffness] : joint stiffness [Back Pain] : back pain [Headache] : headache [Anxiety] : anxiety [Negative] : Heme/Lymph [Suicidal] : not suicidal [Depression] : no depression

## 2022-01-14 NOTE — HISTORY OF PRESENT ILLNESS
[de-identified] : Since the middle of November she has had pain in her abdomen and in her back, she has tingling in her hands and feet.  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  She ordered lab tests that showed low iron.  She is taking iron 325 mg daily which she started on 11/4 and she stopped it due to abdominal pain.  She would like a recommendation for an alternative form.  She will have a follow up visit with derm next week.  She started minoxidil on 11/19 and has noticed swelling of her legs.  She is having pain in her feet and would like to see a new podiatrist.  She is scheduled to see Dr Grullon for knee pain.  She has pressure in her chest and has an appointment with Dr Hurtado.  She is having very small BM's and since stopping the iron they are no longer black.  She donates blood every 2 months.  She would like to renew the butalbital today, she hasn't taken the diazepam in about 6 weeks

## 2022-01-14 NOTE — HEALTH RISK ASSESSMENT
[Fair] : ~his/her~ current health as fair  [Good] : ~his/her~  mood as  good [No falls in past year] : Patient reported no falls in the past year [HIV test declined] : HIV test declined [Hepatitis C test declined] : Hepatitis C test declined [Feels Safe at Home] : Feels safe at home [Reports normal functional visual acuity (ie: able to read med bottle)] : Reports normal functional visual acuity [Guns at Home] : guns at home [With Patient/Caregiver] : , with patient/caregiver [Designated Healthcare Proxy] : Designated healthcare proxy [Name: ___] : Health Care Proxy's Name: [unfilled]  [Relationship: ___] : Relationship: [unfilled] [DNR] : DNR [DNI] : DNI [FreeTextEntry1] : see HPI [No] : No [de-identified] : none [de-identified] : Dr Grullon (Ortho), Dr Hurtado (Cardio), dr Queen (GI), Rosemarie Flores (GYN), Dr Sylvester (Pulm), Dr Stanley (Derm) [de-identified] : very little [de-identified] : healthy [Patient reported mammogram was normal] : Patient reported mammogram was normal [Patient reported colonoscopy was normal] : Patient reported colonoscopy was normal [Change in mental status noted] : No change in mental status noted [Language] : denies difficulty with language [Behavior] : denies difficulty with behavior [Learning/Retaining New Information] : denies difficulty learning/retaining new information [Handling Complex Tasks] : denies difficulty handling complex tasks [Reasoning] : denies difficulty with reasoning [Spatial Ability and Orientation] : denies difficulty with spatial ability and orientation [None] : None [With Significant Other] : lives with significant other [] :  [Fully functional (bathing, dressing, toileting, transferring, walking, feeding)] : Fully functional (bathing, dressing, toileting, transferring, walking, feeding) [Fully functional (using the telephone, shopping, preparing meals, housekeeping, doing laundry, using] : Fully functional and needs no help or supervision to perform IADLs (using the telephone, shopping, preparing meals, housekeeping, doing laundry, using transportation, managing medications and managing finances) [Reports changes in hearing] : Reports no changes in hearing [Reports changes in vision] : Reports no changes in vision [Reports changes in dental health] : Reports no changes in dental health [MammogramDate] : 09/21 [ColonoscopyDate] : 05/16 [de-identified] : securely locked up [AdvancecareDate] : 01/22

## 2022-01-14 NOTE — PLAN
[FreeTextEntry1] : the lab report from derm was reviewed and I disagree with the finding of low ferritin and advised her to only take Slow Fe for 2 weeks prior to blood donation, the POCT U/A result was reviewed, I-STOP checked and fioricet renewed

## 2022-01-14 NOTE — PHYSICAL EXAM
0 [No Edema] : there was no peripheral edema [Soft] : abdomen soft [Non Tender] : non-tender [Non-distended] : non-distended [No Acute Distress] : no acute distress [Well Nourished] : well nourished [Well Developed] : well developed [Normal Voice/Communication] : normal voice/communication [No Respiratory Distress] : no respiratory distress  [Coordination Grossly Intact] : coordination grossly intact [Normal Mood] : the mood was normal [de-identified] : swelling of knees

## 2022-01-27 ENCOUNTER — NON-APPOINTMENT (OUTPATIENT)
Age: 77
End: 2022-01-27

## 2022-03-14 ENCOUNTER — NON-APPOINTMENT (OUTPATIENT)
Age: 77
End: 2022-03-14

## 2022-03-17 RX ORDER — DICLOFENAC EPOLAMINE 0.01 G/1
1.3 SYSTEM TOPICAL TWICE DAILY
Qty: 60 | Refills: 0 | Status: DISCONTINUED | COMMUNITY
Start: 2022-03-17 | End: 2022-03-17

## 2022-03-23 ENCOUNTER — NON-APPOINTMENT (OUTPATIENT)
Age: 77
End: 2022-03-23

## 2022-03-23 ENCOUNTER — APPOINTMENT (OUTPATIENT)
Dept: OPHTHALMOLOGY | Facility: CLINIC | Age: 77
End: 2022-03-23
Payer: MEDICARE

## 2022-03-23 PROCEDURE — 92014 COMPRE OPH EXAM EST PT 1/>: CPT

## 2022-03-24 DIAGNOSIS — Z92.29 PERSONAL HISTORY OF OTHER DRUG THERAPY: ICD-10-CM

## 2022-03-24 DIAGNOSIS — I25.10 ATHEROSCLEROTIC HEART DISEASE OF NATIVE CORONARY ARTERY W/OUT ANGINA PECTORIS: ICD-10-CM

## 2022-03-24 PROBLEM — K58.9 IRRITABLE BOWEL SYNDROME, UNSPECIFIED TYPE: Status: ACTIVE | Noted: 2017-11-19

## 2022-03-24 PROBLEM — K21.9 GERD (GASTROESOPHAGEAL REFLUX DISEASE): Status: ACTIVE | Noted: 2021-06-07

## 2022-03-24 RX ORDER — NEOMYCIN AND POLYMYXIN B SULFATES AND DEXAMETHASONE 3.5; 10000; 1 MG/G; [IU]/G; MG/G
3.5-10000-0.1 OINTMENT OPHTHALMIC
Qty: 1 | Refills: 0 | Status: COMPLETED | COMMUNITY
Start: 2022-01-27 | End: 2022-03-24

## 2022-03-24 RX ORDER — BUTALBITAL, ACETAMINOPHEN AND CAFFEINE 325; 50; 40 MG/1; MG/1; MG/1
50-325-40 TABLET ORAL
Qty: 540 | Refills: 0 | Status: DISCONTINUED | COMMUNITY
End: 2022-03-24

## 2022-03-24 RX ORDER — KETOCONAZOLE 20.5 MG/ML
2 SHAMPOO, SUSPENSION TOPICAL
Refills: 0 | Status: COMPLETED | COMMUNITY
End: 2022-03-24

## 2022-03-25 ENCOUNTER — APPOINTMENT (OUTPATIENT)
Dept: CARDIOTHORACIC SURGERY | Facility: CLINIC | Age: 77
End: 2022-03-25
Payer: MEDICARE

## 2022-03-25 VITALS
TEMPERATURE: 98 F | OXYGEN SATURATION: 98 % | HEIGHT: 66 IN | RESPIRATION RATE: 14 BRPM | SYSTOLIC BLOOD PRESSURE: 136 MMHG | HEART RATE: 84 BPM | DIASTOLIC BLOOD PRESSURE: 75 MMHG

## 2022-03-25 DIAGNOSIS — K58.9 IRRITABLE BOWEL SYNDROME W/OUT DIARRHEA: ICD-10-CM

## 2022-03-25 DIAGNOSIS — Z78.9 OTHER SPECIFIED HEALTH STATUS: ICD-10-CM

## 2022-03-25 DIAGNOSIS — K21.9 GASTRO-ESOPHAGEAL REFLUX DISEASE W/OUT ESOPHAGITIS: ICD-10-CM

## 2022-03-25 PROCEDURE — 99203 OFFICE O/P NEW LOW 30 MIN: CPT

## 2022-03-30 NOTE — PHYSICAL EXAM
[General Appearance - Alert] : alert [General Appearance - In No Acute Distress] : in no acute distress [Sclera] : the sclera and conjunctiva were normal [Neck Appearance] : the appearance of the neck was normal [Jugular Venous Distention Increased] : there was no jugular-venous distention [] : no respiratory distress [Respiration, Rhythm And Depth] : normal respiratory rhythm and effort [Exaggerated Use Of Accessory Muscles For Inspiration] : no accessory muscle use [Auscultation Breath Sounds / Voice Sounds] : lungs were clear to auscultation bilaterally [Apical Impulse] : the apical impulse was normal [Heart Rate And Rhythm] : heart rate was normal and rhythm regular [Heart Sounds] : normal S1 and S2 [Bowel Sounds] : normal bowel sounds [Abdomen Soft] : soft [Abdomen Tenderness] : non-tender [Involuntary Movements] : no involuntary movements were seen [No Focal Deficits] : no focal deficits [Oriented To Time, Place, And Person] : oriented to person, place, and time [Impaired Insight] : insight and judgment were intact [Affect] : the affect was normal [Mood] : the mood was normal [Right Carotid Bruit] : no bruit heard over the right carotid [Left Carotid Bruit] : no bruit heard over the left carotid

## 2022-03-30 NOTE — HISTORY OF PRESENT ILLNESS
[FreeTextEntry1] : Ms. 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 She is referred by Dr. Kiko Hurtado for initial evaluation and management for RT atrial myxoma. \par \par She presents today with her  and reports she has been feeling a lot of palpitations. Though she reports a long hx of palpitations, she reports she started to fell them much more sp her COVID booster last month, so much so that she almost called EMS. She followed up with her cardiologist Dr. Hurtado the following day with a monitor that she reports "didn’t show anything". She reports the workup also included repeat TTE that showed a mass. Reports chest pressure as well at times that is not associated with her palpitations and chest pressure also not associated with exertion or rest. 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 She has a strong family hx of heart disease.  Grandmother  suddenly at age 65.  Father with hx of heart disease.  Brother  at age 76 from MI and had hx of valve replacement. Youngest brother had fatal MI at age 59. Third brother with CABG X 4 in . She reports feeling nervous regarding her myxoma and needing to have surgery. \par \par Denies hx of prior MI, CVA, lung, kidney or liver disease.  \par \par

## 2022-03-30 NOTE — END OF VISIT
[FreeTextEntry3] : Written by Rufus Benjamin NP, acting as a scribe for Dr. Wade\par “The documentation recorded by the scribe accurately reflects the service I personally performed and the decisions made by me.” Signature Michael Wade MD.

## 2022-03-30 NOTE — DATA REVIEWED
[FreeTextEntry1] : 3/10/22 TTE revealed LVEF 55 to 60%, Moderately echogenic mass, pedunculated with attachment to the posterior wall of RT atrium, measuring up to 2.8 cm, consistent with RT atrial myxoma. \par \par 3/16/22 CTA heart revealed Normal anatomic distribution of the coronary arteries. Evidence of possible obstructive atherosclerotic disease in the RCA. Diffuse moderate atherosclerotic disease in the LD and Lcx. Ascending aorta 4.1 cm.

## 2022-03-30 NOTE — CONSULT LETTER
[Dear  ___] : Dear  [unfilled], [FreeTextEntry2] : Dr.David Hurtado [FreeTextEntry1] : I had the pleasure of seeing your patient, AMADOU POSADAS,in my office today. \par \par We take a multidisciplinary team approach to patient care and consider you, the referring physician, an extension of our team. We will maintain an open line of communication with you throughout your patient's treatment course. \par \par She  is being evaluated for RT atrial myxoma . I have reviewed all of the patient's medical records and diagnostic images at the time of her  office consultation. I have enclosed a copy for your records. \par \par 3/10/22 TTE revealed LVEF 55 to 60%, Moderately echogenic mass, pedunculated with attachment to the posterior wall of RT atrium, measuring up to 2.8 cm, consistent with RT atrial myxoma. \par \par 3/16/22 CTA heart revealed Normal anatomic distribution of the coronary arteries. Evidence of possible obstructive atherosclerotic disease in the RCA. Diffuse moderate atherosclerotic disease in the LD and Lcx. Ascending aorta 4.1 cm. \par \par I have reviewed the indications for surgery and anatomy of the heart. The patient meets criteria for surgery. I have recommended that the patient is a candidate for a ______________. \par \par Surgery is scheduled for ___________.  I will update you on her perioperative status and disposition upon discharge. \par \par I appreciate the opportunity to care for your patient at the  [facility] based at [place]. If there are any questions or concerns, please call me  at [phone].\par \par Please see my note below. \par \par Sincerely, \par \par \par \par \par Michael Wade MD\par Director\par [facility]\par Professor \par Cardiovascular & Thoracic Surgery\par [facility] [employer].\par \par \par [facility]:\par Department of Cardiovascular and Thoracic Surgery\[address], [address]\[facility] Office: [phone]\par Fax: [phone]\par \par Sara Tobar\par  \[facility] Department of Cardiovascular and Thoracic Surgery\[address] [place], [address]\par Phone: [phone]\par Office: [phone]\par \par \par \par \par \par

## 2022-03-30 NOTE — REVIEW OF SYSTEMS
[As Noted in HPI] : as noted in HPI [Chest Pain] : chest pain [Palpitations] : palpitations [Negative] : Psychiatric [Lower Ext Edema] : no lower extremity edema [Shortness Of Breath] : no shortness of breath [SOB on Exertion] : no shortness of breath during exertion

## 2022-03-30 NOTE — ASSESSMENT
[FreeTextEntry1] : I reviewed the cardiac imaging, medical records and reports with patient and discussed the case.  I discussed the risks, benefits and alternatives to surgery. Risks included but not limited to  bleeding, stroke,  myocardial Infarction, kidney problems, blood transfusion, permanent  pacemaker implantation, infections and death. I  quoted a 2-3% operative mortality and complication risks. I also discussed the various approaches in detail. I feel that the patient will benefit and is a candidate for right atrial myxoma excision.  All questions and concerns were addressed and patient agrees to proceed with surgery.\par \par \par Plan:\par 1) Right atrial myxoma excision scheduled for 4/1/2022\par 2) Cardiac Catherization to evaluate coronary arteries scheduled for 3/31/2022. Will be pre admitted Thursday.\par \par

## 2022-03-31 ENCOUNTER — INPATIENT (INPATIENT)
Facility: HOSPITAL | Age: 77
LOS: 4 days | Discharge: HOME HEALTH | DRG: 228 | End: 2022-04-05
Attending: THORACIC SURGERY (CARDIOTHORACIC VASCULAR SURGERY) | Admitting: INTERNAL MEDICINE
Payer: MEDICARE

## 2022-03-31 VITALS
TEMPERATURE: 98 F | RESPIRATION RATE: 16 BRPM | WEIGHT: 192.9 LBS | OXYGEN SATURATION: 100 % | SYSTOLIC BLOOD PRESSURE: 162 MMHG | DIASTOLIC BLOOD PRESSURE: 104 MMHG | HEIGHT: 66 IN | HEART RATE: 74 BPM

## 2022-03-31 DIAGNOSIS — Z98.51 TUBAL LIGATION STATUS: Chronic | ICD-10-CM

## 2022-03-31 DIAGNOSIS — Z98.890 OTHER SPECIFIED POSTPROCEDURAL STATES: Chronic | ICD-10-CM

## 2022-03-31 DIAGNOSIS — D15.1 BENIGN NEOPLASM OF HEART: ICD-10-CM

## 2022-03-31 DIAGNOSIS — I25.10 ATHEROSCLEROTIC HEART DISEASE OF NATIVE CORONARY ARTERY WITHOUT ANGINA PECTORIS: ICD-10-CM

## 2022-03-31 DIAGNOSIS — E78.5 HYPERLIPIDEMIA, UNSPECIFIED: ICD-10-CM

## 2022-03-31 DIAGNOSIS — Z96.642 PRESENCE OF LEFT ARTIFICIAL HIP JOINT: Chronic | ICD-10-CM

## 2022-03-31 DIAGNOSIS — K21.9 GASTRO-ESOPHAGEAL REFLUX DISEASE WITHOUT ESOPHAGITIS: ICD-10-CM

## 2022-03-31 LAB
A1C WITH ESTIMATED AVERAGE GLUCOSE RESULT: 6.1 % — HIGH (ref 4–5.6)
ALBUMIN SERPL ELPH-MCNC: 4.1 G/DL — SIGNIFICANT CHANGE UP (ref 3.3–5)
ALP SERPL-CCNC: 127 U/L — HIGH (ref 40–120)
ALT FLD-CCNC: 13 U/L — SIGNIFICANT CHANGE UP (ref 10–45)
ANION GAP SERPL CALC-SCNC: 10 MMOL/L — SIGNIFICANT CHANGE UP (ref 5–17)
APPEARANCE UR: CLEAR — SIGNIFICANT CHANGE UP
APTT BLD: 27.7 SEC — SIGNIFICANT CHANGE UP (ref 27.5–35.5)
AST SERPL-CCNC: 15 U/L — SIGNIFICANT CHANGE UP (ref 10–40)
BILIRUB DIRECT SERPL-MCNC: <0.1 MG/DL — SIGNIFICANT CHANGE UP (ref 0–0.3)
BILIRUB INDIRECT FLD-MCNC: >0.1 MG/DL — LOW (ref 0.2–1)
BILIRUB SERPL-MCNC: 0.2 MG/DL — SIGNIFICANT CHANGE UP (ref 0.2–1.2)
BILIRUB UR-MCNC: NEGATIVE — SIGNIFICANT CHANGE UP
BLD GP AB SCN SERPL QL: NEGATIVE — SIGNIFICANT CHANGE UP
BUN SERPL-MCNC: 21 MG/DL — SIGNIFICANT CHANGE UP (ref 7–23)
CALCIUM SERPL-MCNC: 9.3 MG/DL — SIGNIFICANT CHANGE UP (ref 8.4–10.5)
CHLORIDE SERPL-SCNC: 103 MMOL/L — SIGNIFICANT CHANGE UP (ref 96–108)
CHOLEST SERPL-MCNC: 183 MG/DL — SIGNIFICANT CHANGE UP
CO2 SERPL-SCNC: 26 MMOL/L — SIGNIFICANT CHANGE UP (ref 22–31)
COLOR SPEC: SIGNIFICANT CHANGE UP
CREAT SERPL-MCNC: 0.73 MG/DL — SIGNIFICANT CHANGE UP (ref 0.5–1.3)
DIFF PNL FLD: NEGATIVE — SIGNIFICANT CHANGE UP
EGFR: 86 ML/MIN/1.73M2 — SIGNIFICANT CHANGE UP
ESTIMATED AVERAGE GLUCOSE: 128 MG/DL — HIGH (ref 68–114)
FIBRINOGEN PPP-MCNC: 557 MG/DL — HIGH (ref 330–520)
GLUCOSE SERPL-MCNC: 102 MG/DL — HIGH (ref 70–99)
GLUCOSE UR QL: NEGATIVE — SIGNIFICANT CHANGE UP
HCT VFR BLD CALC: 36.4 % — SIGNIFICANT CHANGE UP (ref 34.5–45)
HDLC SERPL-MCNC: 79 MG/DL — SIGNIFICANT CHANGE UP
HGB BLD-MCNC: 11.8 G/DL — SIGNIFICANT CHANGE UP (ref 11.5–15.5)
INR BLD: 1.04 RATIO — SIGNIFICANT CHANGE UP (ref 0.88–1.16)
KETONES UR-MCNC: NEGATIVE — SIGNIFICANT CHANGE UP
LEUKOCYTE ESTERASE UR-ACNC: NEGATIVE — SIGNIFICANT CHANGE UP
LIPID PNL WITH DIRECT LDL SERPL: 92 MG/DL — SIGNIFICANT CHANGE UP
MAGNESIUM SERPL-MCNC: 2.2 MG/DL — SIGNIFICANT CHANGE UP (ref 1.6–2.6)
MCHC RBC-ENTMCNC: 29.1 PG — SIGNIFICANT CHANGE UP (ref 27–34)
MCHC RBC-ENTMCNC: 32.4 GM/DL — SIGNIFICANT CHANGE UP (ref 32–36)
MCV RBC AUTO: 89.7 FL — SIGNIFICANT CHANGE UP (ref 80–100)
NITRITE UR-MCNC: NEGATIVE — SIGNIFICANT CHANGE UP
NON HDL CHOLESTEROL: 104 MG/DL — SIGNIFICANT CHANGE UP
NRBC # BLD: 0 /100 WBCS — SIGNIFICANT CHANGE UP (ref 0–0)
NT-PROBNP SERPL-SCNC: 109 PG/ML — SIGNIFICANT CHANGE UP (ref 0–300)
PH UR: 7.5 — SIGNIFICANT CHANGE UP (ref 5–8)
PLATELET # BLD AUTO: 172 K/UL — SIGNIFICANT CHANGE UP (ref 150–400)
POTASSIUM SERPL-MCNC: 4.5 MMOL/L — SIGNIFICANT CHANGE UP (ref 3.5–5.3)
POTASSIUM SERPL-SCNC: 4.5 MMOL/L — SIGNIFICANT CHANGE UP (ref 3.5–5.3)
PROT SERPL-MCNC: 6.5 G/DL — SIGNIFICANT CHANGE UP (ref 6–8.3)
PROT UR-MCNC: NEGATIVE — SIGNIFICANT CHANGE UP
PROTHROM AB SERPL-ACNC: 12.1 SEC — SIGNIFICANT CHANGE UP (ref 10.5–13.4)
RBC # BLD: 4.06 M/UL — SIGNIFICANT CHANGE UP (ref 3.8–5.2)
RBC # FLD: 12.5 % — SIGNIFICANT CHANGE UP (ref 10.3–14.5)
RH IG SCN BLD-IMP: POSITIVE — SIGNIFICANT CHANGE UP
RH IG SCN BLD-IMP: POSITIVE — SIGNIFICANT CHANGE UP
SARS-COV-2 RNA SPEC QL NAA+PROBE: SIGNIFICANT CHANGE UP
SODIUM SERPL-SCNC: 139 MMOL/L — SIGNIFICANT CHANGE UP (ref 135–145)
SP GR SPEC: 1.03 — HIGH (ref 1.01–1.02)
T3 SERPL-MCNC: 130 NG/DL — SIGNIFICANT CHANGE UP (ref 80–200)
T4 AB SER-ACNC: 6.7 UG/DL — SIGNIFICANT CHANGE UP (ref 4.6–12)
TRIGL SERPL-MCNC: 58 MG/DL — SIGNIFICANT CHANGE UP
TSH SERPL-MCNC: 2.4 UIU/ML — SIGNIFICANT CHANGE UP (ref 0.27–4.2)
UROBILINOGEN FLD QL: NEGATIVE — SIGNIFICANT CHANGE UP
WBC # BLD: 4.7 K/UL — SIGNIFICANT CHANGE UP (ref 3.8–10.5)
WBC # FLD AUTO: 4.7 K/UL — SIGNIFICANT CHANGE UP (ref 3.8–10.5)

## 2022-03-31 PROCEDURE — 99152 MOD SED SAME PHYS/QHP 5/>YRS: CPT

## 2022-03-31 PROCEDURE — ZZZZZ: CPT

## 2022-03-31 PROCEDURE — 93306 TTE W/DOPPLER COMPLETE: CPT | Mod: 26

## 2022-03-31 PROCEDURE — 93454 CORONARY ARTERY ANGIO S&I: CPT | Mod: 26

## 2022-03-31 PROCEDURE — 71045 X-RAY EXAM CHEST 1 VIEW: CPT | Mod: 26

## 2022-03-31 PROCEDURE — 93010 ELECTROCARDIOGRAM REPORT: CPT

## 2022-03-31 RX ORDER — POLYETHYLENE GLYCOL 3350 17 G/17G
17 POWDER, FOR SOLUTION ORAL DAILY
Refills: 0 | Status: DISCONTINUED | OUTPATIENT
Start: 2022-03-31 | End: 2022-04-01

## 2022-03-31 RX ORDER — METOPROLOL TARTRATE 50 MG
37.5 TABLET ORAL EVERY 12 HOURS
Refills: 0 | Status: DISCONTINUED | OUTPATIENT
Start: 2022-03-31 | End: 2022-04-01

## 2022-03-31 RX ORDER — MUPIROCIN 20 MG/G
1 OINTMENT TOPICAL
Refills: 0 | Status: DISCONTINUED | OUTPATIENT
Start: 2022-03-31 | End: 2022-04-01

## 2022-03-31 RX ORDER — ROSUVASTATIN CALCIUM 5 MG/1
5 TABLET ORAL AT BEDTIME
Refills: 0 | Status: DISCONTINUED | OUTPATIENT
Start: 2022-03-31 | End: 2022-04-01

## 2022-03-31 RX ORDER — CHOLECALCIFEROL (VITAMIN D3) 125 MCG
1000 CAPSULE ORAL DAILY
Refills: 0 | Status: DISCONTINUED | OUTPATIENT
Start: 2022-03-31 | End: 2022-04-01

## 2022-03-31 RX ORDER — ACETAMINOPHEN 500 MG
650 TABLET ORAL ONCE
Refills: 0 | Status: COMPLETED | OUTPATIENT
Start: 2022-03-31 | End: 2022-03-31

## 2022-03-31 RX ORDER — SODIUM CHLORIDE 9 MG/ML
3 INJECTION INTRAMUSCULAR; INTRAVENOUS; SUBCUTANEOUS EVERY 8 HOURS
Refills: 0 | Status: DISCONTINUED | OUTPATIENT
Start: 2022-03-31 | End: 2022-04-01

## 2022-03-31 RX ORDER — ASCORBIC ACID 60 MG
2000 TABLET,CHEWABLE ORAL ONCE
Refills: 0 | Status: COMPLETED | OUTPATIENT
Start: 2022-03-31 | End: 2022-03-31

## 2022-03-31 RX ORDER — CHLORHEXIDINE GLUCONATE 213 G/1000ML
30 SOLUTION TOPICAL ONCE
Refills: 0 | Status: COMPLETED | OUTPATIENT
Start: 2022-03-31 | End: 2022-04-01

## 2022-03-31 RX ORDER — METOPROLOL TARTRATE 50 MG
37.5 TABLET ORAL DAILY
Refills: 0 | Status: DISCONTINUED | OUTPATIENT
Start: 2022-03-31 | End: 2022-03-31

## 2022-03-31 RX ORDER — CHLORHEXIDINE GLUCONATE 213 G/1000ML
1 SOLUTION TOPICAL ONCE
Refills: 0 | Status: COMPLETED | OUTPATIENT
Start: 2022-04-01 | End: 2022-04-01

## 2022-03-31 RX ORDER — CHLORHEXIDINE GLUCONATE 213 G/1000ML
1 SOLUTION TOPICAL ONCE
Refills: 0 | Status: COMPLETED | OUTPATIENT
Start: 2022-03-31 | End: 2022-03-31

## 2022-03-31 RX ORDER — GABAPENTIN 400 MG/1
300 CAPSULE ORAL ONCE
Refills: 0 | Status: COMPLETED | OUTPATIENT
Start: 2022-03-31 | End: 2022-04-01

## 2022-03-31 RX ORDER — ATORVASTATIN CALCIUM 80 MG/1
20 TABLET, FILM COATED ORAL AT BEDTIME
Refills: 0 | Status: DISCONTINUED | OUTPATIENT
Start: 2022-03-31 | End: 2022-03-31

## 2022-03-31 RX ORDER — PANTOPRAZOLE SODIUM 20 MG/1
40 TABLET, DELAYED RELEASE ORAL
Refills: 0 | Status: DISCONTINUED | OUTPATIENT
Start: 2022-03-31 | End: 2022-04-01

## 2022-03-31 RX ORDER — ACETAMINOPHEN 500 MG
1000 TABLET ORAL ONCE
Refills: 0 | Status: COMPLETED | OUTPATIENT
Start: 2022-03-31 | End: 2022-04-01

## 2022-03-31 RX ORDER — CEFUROXIME AXETIL 250 MG
1500 TABLET ORAL ONCE
Refills: 0 | Status: DISCONTINUED | OUTPATIENT
Start: 2022-03-31 | End: 2022-04-01

## 2022-03-31 RX ADMIN — Medication 1 TABLET(S): at 18:02

## 2022-03-31 RX ADMIN — Medication 37.5 MILLIGRAM(S): at 18:09

## 2022-03-31 RX ADMIN — SODIUM CHLORIDE 3 MILLILITER(S): 9 INJECTION INTRAMUSCULAR; INTRAVENOUS; SUBCUTANEOUS at 14:00

## 2022-03-31 RX ADMIN — ROSUVASTATIN CALCIUM 5 MILLIGRAM(S): 5 TABLET ORAL at 22:10

## 2022-03-31 RX ADMIN — Medication 2000 MILLIGRAM(S): at 22:00

## 2022-03-31 RX ADMIN — Medication 650 MILLIGRAM(S): at 10:00

## 2022-03-31 RX ADMIN — MUPIROCIN 1 APPLICATION(S): 20 OINTMENT TOPICAL at 18:10

## 2022-03-31 RX ADMIN — SODIUM CHLORIDE 3 MILLILITER(S): 9 INJECTION INTRAMUSCULAR; INTRAVENOUS; SUBCUTANEOUS at 22:00

## 2022-03-31 RX ADMIN — CHLORHEXIDINE GLUCONATE 1 APPLICATION(S): 213 SOLUTION TOPICAL at 22:03

## 2022-03-31 RX ADMIN — Medication 1000 UNIT(S): at 18:01

## 2022-03-31 RX ADMIN — Medication 650 MILLIGRAM(S): at 13:45

## 2022-03-31 NOTE — H&P CARDIOLOGY - NSICDXFAMILYHX_GEN_ALL_CORE_FT
FAMILY HISTORY:  Father  Still living? No  FH: CAD (coronary artery disease), Age at diagnosis: Age Unknown  FH: COPD (chronic obstructive pulmonary disease), Age at diagnosis: Age Unknown  FH: HTN (hypertension), Age at diagnosis: Age Unknown    Mother  Still living? No  FH: Alzheimer's disease, Age at diagnosis: Age Unknown  FH: diabetes mellitus, Age at diagnosis: Age Unknown    Sibling  Still living? Yes, Estimated age: Age Unknown  Family history of malignant melanoma, Age at diagnosis: Age Unknown  FH: CAD (coronary artery disease), Age at diagnosis: Age Unknown  FHx: diabetes mellitus, Age at diagnosis: Age Unknown  FHx: mitral valve repair, Age at diagnosis: Age Unknown

## 2022-03-31 NOTE — H&P CARDIOLOGY - HISTORY OF PRESENT ILLNESS
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 Pt with strong family hx of heart dz: grandmother  suddenly at age 65, brother  at 76 from MI, younger brother  from MI age at 59, 3rd brother with CABG.  Pt denies MI/CVA/lung/kidney/liver disease. Pt presents with left heart cath with Dr Oneal today with myxoma excision with Dr Wade tomorrow.     TTE 3/10/22: EF 55-60%, mod echogenic mass, pedunculated with attachment to the posterior wall of right atrium, measuring 2.8cm c/w right atrial myxoma    CTA heart 3/16/22: possible obstructive RCA dz, diffuse moderated dz LAD and LCX. Ascending aorta 4.1 cm.

## 2022-03-31 NOTE — H&P CARDIOLOGY - NSICDXPASTSURGICALHX_GEN_ALL_CORE_FT
PAST SURGICAL HISTORY:  H/O breast biopsy     History of total hip replacement, left     S/P tubal ligation

## 2022-03-31 NOTE — ASU PATIENT PROFILE, ADULT - FALL HARM RISK - UNIVERSAL INTERVENTIONS
Bed in lowest position, wheels locked, appropriate side rails in place/Call bell, personal items and telephone in reach/Instruct patient to call for assistance before getting out of bed or chair/Non-slip footwear when patient is out of bed/[place] to call system/Purposeful Proactive Rounding/Room/bathroom lighting operational, light cord in reach

## 2022-03-31 NOTE — PATIENT PROFILE ADULT - FALL HARM RISK - UNIVERSAL INTERVENTIONS
Bed in lowest position, wheels locked, appropriate side rails in place/Call bell, personal items and telephone in reach/Instruct patient to call for assistance before getting out of bed or chair/Non-slip footwear when patient is out of bed/[place] to call system/Physically safe environment - no spills, clutter or unnecessary equipment/Purposeful Proactive Rounding/Room/bathroom lighting operational, light cord in reach

## 2022-03-31 NOTE — PROGRESS NOTE ADULT - ASSESSMENT
This is a 76 y/o female with PMHx of HTN, HLD, GERD, CAD, prediabetes, migraine (on Fiorcet and Valium), hair loss (on minoxidil) recently diagnosed right atrial myxoma diagnosed by Dr Kiko Hurtado. Pt with strong family hx of heart dz: grandmother  suddenly at age 65, brother  at 76 from MI, younger brother  from MI age at 59, 3rd brother with CABG.  Pt presents with left heart cath with Dr Oneal today prior to scheduled myxoma excision with Dr Wade 22. LHC shows non-obs CAD.

## 2022-03-31 NOTE — PROGRESS NOTE ADULT - PROBLEM SELECTOR PLAN 1
Continue pre-op cardiac surgery workup  Check PFTs/CXR  Check MRSA/MSSA swab/UA/ thyroid panel/ pro-BNP/ fibrinogen  Continue home ToprolXL 37.5 daily  Keep NPO p MN, pre-op chlorhexidine shower  Plan for atrial myxoma excision 4/1 with Dr. Wade

## 2022-03-31 NOTE — PROGRESS NOTE ADULT - SUBJECTIVE AND OBJECTIVE BOX
Cardiac Surgery Pre-op Note:     Surgeon:    Procedure: (Date) (Procedure)    HPI:  77 year old  female no implantable devices with h/o HTN, HLD, GERD, YIN, prediabetes, migraine (on Fiorcet and Valium), hair loss (on minoxidil) recently diagnosed right atrial myxoma diagnosed by Dr Kiko Hurtado. Pt with strong family hx of heart dz: grandmother  suddenly at age 65, brother  at 76 from MI, younger brother  from MI age at 59, 3rd brother with CABG.  Pt denies MI/CVA/lung/kidney/liver disease. Pt presents with left heart cath with Dr Oneal today with myxoma excision with Dr Wade tomorrow.     TTE 3/10/22: EF 55-60%, mod echogenic mass, pedunculated with attachment to the posterior wall of right atrium, measuring 2.8cm c/w right atrial myxoma    CTA heart 3/16/22: possible obstructive RCA dz, diffuse moderated dz LAD and LCX. Ascending aorta 4.1 cm.      (31 Mar 2022 08:50)      PAST MEDICAL & SURGICAL HISTORY:  CAD (coronary artery disease)    Atrial myxoma    Cluster headache    Vitamin B12 deficiency    Disc degeneration, lumbar    H/O diverticulitis of colon    HTN (hypertension)    Folate deficiency    GERD (gastroesophageal reflux disease)    Generalized anxiety disorder    Osteoarthritis, chronic    Hair loss  on minoxidil    Hiatal hernia    History of prediabetes    Insomnia    IBS (irritable bowel syndrome)    Migraine headache    Osteoporosis    HLD (hyperlipidemia)    Statin-induced myositis    Vitamin D deficiency    Rotator cuff tear arthropathy    History of duodenal ulcer    H/O breast biopsy    History of total hip replacement, left    S/P tubal ligation        MEDICATIONS  (STANDING):  acetaminophen     Tablet .. 650 milliGRAM(s) Oral once  atorvastatin 20 milliGRAM(s) Oral at bedtime  cholecalciferol 1000 Unit(s) Oral daily  metoprolol succinate ER 35.7 milliGRAM(s) Oral daily  multivitamin 1 Tablet(s) Oral daily  pantoprazole    Tablet 40 milliGRAM(s) Oral before breakfast  sodium chloride 0.9% lock flush 3 milliLiter(s) IV Push every 8 hours    MEDICATIONS  (PRN):  polyethylene glycol 3350 17 Gram(s) Oral daily PRN Constipation      Vital Signs Last 24 Hrs  T(C): 36.7 (22 @ 08:53), Max: 36.7 (22 @ 08:50)  T(F): 98 (22 @ 08:53), Max: 98 (22 @ 08:50)  HR: 76 (22 @ 12:35) (69 - 76)  BP: 121/69 (22 @ 12:35) (121/69 - 162/104)  RR: 16 (22 @ 12:35) (15 - 18)  SpO2: 99% (22 @ 12:35) (98% - 100%)          ABO Interpretation: O ( @ 09:21)     Daily Height in cm: 167.64 (31 Mar 2022 08:53)    Daily Weight in k.5 (31 Mar 2022 08:50)  Admit Wt: Drug Dosing Weight  Height (cm): 167.6 (31 Mar 2022 09:51)  Weight (kg): 87.5 (31 Mar 2022 09:51)  BMI (kg/m2): 31.2 (31 Mar 2022 09:51)  BSA (m2): 1.97 (31 Mar 2022 09:51)    Labs:                        11.8   4.70  )-----------( 172      ( 31 Mar 2022 09:13 )             36.4         139  |  103  |  21  ----------------------------<  102<H>  4.5   |  26  |  0.73    Ca    9.3      31 Mar 2022 09:11  Mg     2.2         TPro  6.5  /  Alb  4.1  /  TBili  0.2  /  DBili  <0.1  /  AST  15  /  ALT  13  /  AlkPhos  127<H>      PT/INR - ( 31 Mar 2022 09:13 )   PT: 12.1 sec;   INR: 1.04 ratio         PTT - ( 31 Mar 2022 09:13 )  PTT:27.7 sec    ABO Interpretation: O ( @ 09:21)        Thyroid Panel:   MRSA:  / MSSA:     P2Y12    CXR:     Carotid Duplex:      PFT's:    Echocardiogram:    Cardiac catheterization:    PHYSICAL EXAM:  Neuro: A&Ox3, NAD  Pulm: B/L BS CTA  CV: RRR,+S1S2  Abd: Soft, NT/ND +BSX4Q  Ext: B/L LE no edema, +PP, no calf tenderness    Pt has AICD/PPM [ ] Yes  [x ] No             Brand Name:  Pre-op Beta Blocker ordered within 24 hrs of surgery (CABG ONLY)?  [x ] Yes  [ ] No  If not, Why?  Type & Cross  [ ] Yes  [ ] No  NPO after Midnight [x ] Yes  [ ] No  Pre-op ABX ordered, to be taped on chart:  [ x] Yes  [ ] No     Hibiclens/Peridex ordered [x ] Yes  [ ] No  Intraop on Hold: PRBCs, CXR, ANOOP [x ]   Consent obtained  [x ] Yes  [ ] No Cardiac Surgery Pre-op Note:     Surgeon: Dr. Wade    Procedure: 22 atrial myxoma excision    HPI:  77 year old  female no implantable devices with h/o HTN, HLD, GERD, YIN, prediabetes, migraine (on Fiorcet and Valium), hair loss (on minoxidil) recently diagnosed right atrial myxoma diagnosed by Dr Kiko Hurtado. Pt with strong family hx of heart dz: grandmother  suddenly at age 65, brother  at 76 from MI, younger brother  from MI age at 59, 3rd brother with CABG.  Pt denies MI/CVA/lung/kidney/liver disease. Pt presents with left heart cath with Dr Oneal today with myxoma excision with Dr Wade 22.     TTE 3/10/22: EF 55-60%, mod echogenic mass, pedunculated with attachment to the posterior wall of right atrium, measuring 2.8cm c/w right atrial myxoma    CTA heart 3/16/22: possible obstructive RCA dz, diffuse moderated dz LAD and LCX. Ascending aorta 4.1 cm.      (31 Mar 2022 08:50)      PAST MEDICAL & SURGICAL HISTORY:  CAD (coronary artery disease)    Atrial myxoma    Cluster headache    Vitamin B12 deficiency    Disc degeneration, lumbar    H/O diverticulitis of colon    HTN (hypertension)    Folate deficiency    GERD (gastroesophageal reflux disease)    Generalized anxiety disorder    Osteoarthritis, chronic    Hair loss  on minoxidil    Hiatal hernia    History of prediabetes    Insomnia    IBS (irritable bowel syndrome)    Migraine headache    Osteoporosis    HLD (hyperlipidemia)    Statin-induced myositis    Vitamin D deficiency    Rotator cuff tear arthropathy    History of duodenal ulcer    H/O breast biopsy    History of total hip replacement, left    S/P tubal ligation        MEDICATIONS  (STANDING):  acetaminophen     Tablet .. 650 milliGRAM(s) Oral once  atorvastatin 20 milliGRAM(s) Oral at bedtime  cholecalciferol 1000 Unit(s) Oral daily  metoprolol succinate ER 35.7 milliGRAM(s) Oral daily  multivitamin 1 Tablet(s) Oral daily  pantoprazole    Tablet 40 milliGRAM(s) Oral before breakfast  sodium chloride 0.9% lock flush 3 milliLiter(s) IV Push every 8 hours      Vital Signs Last 24 Hrs  T(C): 36.7 (22 @ 08:53), Max: 36.7 (22 @ 08:50)  T(F): 98 (22 @ 08:53), Max: 98 (22 @ 08:50)  HR: 76 (22 @ 12:35) (69 - 76)  BP: 121/69 (22 @ 12:35) (121/69 - 162/104)  RR: 16 (22 @ 12:35) (15 - 18)  SpO2: 99% (22 @ 12:35) (98% - 100%)              Height (cm): 167.6   Weight (kg): 87.5     BMI (kg/m2): 31.2    (31 Mar 2022 09:51)      Labs:                        11.8   4.70  )-----------( 172      ( 31 Mar 2022 09:13 )             36.4    139  |  103  |  21  ----------------------------<  102<H>  4.5   |  26  |  0.73    AST  15  /  ALT  13  /  AlkPhos  127<H>      PT/INR/PTT - ( 31 Mar 2022 09:13 )   PT: 12.1 sec;   INR: 1.04 ratio    PTT:27.7 sec    ABO Interpretation: O ( @ 09:21)    Thyroid Panel: sent, pending results    MRSA:  / MSSA: pending    CXR: pending    PFT's: pending    TTE 3/10/22: EF 55-60%, mod echogenic mass, pedunculated with attachment to the posterior wall of right atrium, measuring 2.8cm c/w right atrial myxoma    < from: Cardiac Catheterization (22 @ 11:22) >  Diagnostic Findings:     Coronary Angiography   LM   Left main artery: Angiography shows minor irregularities.      LAD   Left anterior descending artery: Angiography shows minor  irregularities.    CX   Circumflex: Angiography shows minor irregularities.      RCA   Right coronary artery: Angiography shows minor irregularities.        PHYSICAL EXAM:  Neuro: A&Ox3, NAD  Pulm: B/L BS CTA  CV: RRR,+S1S2  Abd: Soft, NT/ND +BSX4Q  Ext: B/L LE no edema, +PP, no calf tenderness      Pt has AICD/PPM [ ] Yes  [x ] No              Pre-op Beta Blocker ordered within 24 hrs of surgery (CABG ONLY)?  [ ] Yes  [ ] No  If not, Why?  N/A  Type & Cross  [X] Yes  [ ] No  NPO after Midnight [x ] Yes  [ ] No  Pre-op ABX ordered, to be taped on chart:  [ x] Yes  [ ] No     Hibiclens/Peridex ordered [x ] Yes  [ ] No  Intraop on Hold: PRBCs, CXR, ANOOP [x ]   Consent obtained  [x ] Yes  [ ] No

## 2022-03-31 NOTE — H&P CARDIOLOGY - NSICDXPASTMEDICALHX_GEN_ALL_CORE_FT
PAST MEDICAL HISTORY:  Atrial myxoma     CAD (coronary artery disease)     Cluster headache     Disc degeneration, lumbar     Folate deficiency     Generalized anxiety disorder     GERD (gastroesophageal reflux disease)     H/O diverticulitis of colon     Hair loss on minoxidil    Hiatal hernia     History of duodenal ulcer     History of prediabetes     HLD (hyperlipidemia)     HTN (hypertension)     IBS (irritable bowel syndrome)     Insomnia     Migraine headache     Osteoarthritis, chronic     Osteoporosis     Rotator cuff tear arthropathy     Statin-induced myositis     Vitamin B12 deficiency     Vitamin D deficiency

## 2022-04-01 ENCOUNTER — RESULT REVIEW (OUTPATIENT)
Age: 77
End: 2022-04-01

## 2022-04-01 ENCOUNTER — APPOINTMENT (OUTPATIENT)
Dept: CARDIOTHORACIC SURGERY | Facility: HOSPITAL | Age: 77
End: 2022-04-01

## 2022-04-01 LAB
ALBUMIN SERPL ELPH-MCNC: 3.4 G/DL — SIGNIFICANT CHANGE UP (ref 3.3–5)
ALP SERPL-CCNC: 89 U/L — SIGNIFICANT CHANGE UP (ref 40–120)
ALT FLD-CCNC: 12 U/L — SIGNIFICANT CHANGE UP (ref 10–45)
ANION GAP SERPL CALC-SCNC: 11 MMOL/L — SIGNIFICANT CHANGE UP (ref 5–17)
APTT BLD: 30.8 SEC — SIGNIFICANT CHANGE UP (ref 27.5–35.5)
AST SERPL-CCNC: 23 U/L — SIGNIFICANT CHANGE UP (ref 10–40)
BASOPHILS # BLD AUTO: 0.01 K/UL — SIGNIFICANT CHANGE UP (ref 0–0.2)
BASOPHILS NFR BLD AUTO: 0.1 % — SIGNIFICANT CHANGE UP (ref 0–2)
BILIRUB SERPL-MCNC: 0.4 MG/DL — SIGNIFICANT CHANGE UP (ref 0.2–1.2)
BUN SERPL-MCNC: 13 MG/DL — SIGNIFICANT CHANGE UP (ref 7–23)
CALCIUM SERPL-MCNC: 8.4 MG/DL — SIGNIFICANT CHANGE UP (ref 8.4–10.5)
CHLORIDE SERPL-SCNC: 103 MMOL/L — SIGNIFICANT CHANGE UP (ref 96–108)
CK MB BLD-MCNC: 14.7 % — HIGH (ref 0–3.5)
CK MB CFR SERPL CALC: 19.3 NG/ML — HIGH (ref 0–3.8)
CK SERPL-CCNC: 131 U/L — SIGNIFICANT CHANGE UP (ref 25–170)
CO2 SERPL-SCNC: 24 MMOL/L — SIGNIFICANT CHANGE UP (ref 22–31)
CREAT SERPL-MCNC: 0.59 MG/DL — SIGNIFICANT CHANGE UP (ref 0.5–1.3)
EGFR: 94 ML/MIN/1.73M2 — SIGNIFICANT CHANGE UP
EOSINOPHIL # BLD AUTO: 0.03 K/UL — SIGNIFICANT CHANGE UP (ref 0–0.5)
EOSINOPHIL NFR BLD AUTO: 0.4 % — SIGNIFICANT CHANGE UP (ref 0–6)
FIBRINOGEN PPP-MCNC: 402 MG/DL — SIGNIFICANT CHANGE UP (ref 330–520)
GAS PNL BLDA: SIGNIFICANT CHANGE UP
GLUCOSE BLDC GLUCOMTR-MCNC: 120 MG/DL — HIGH (ref 70–99)
GLUCOSE BLDC GLUCOMTR-MCNC: 123 MG/DL — HIGH (ref 70–99)
GLUCOSE BLDC GLUCOMTR-MCNC: 127 MG/DL — HIGH (ref 70–99)
GLUCOSE BLDC GLUCOMTR-MCNC: 138 MG/DL — HIGH (ref 70–99)
GLUCOSE BLDC GLUCOMTR-MCNC: 144 MG/DL — HIGH (ref 70–99)
GLUCOSE BLDC GLUCOMTR-MCNC: 146 MG/DL — HIGH (ref 70–99)
GLUCOSE BLDC GLUCOMTR-MCNC: 150 MG/DL — HIGH (ref 70–99)
GLUCOSE BLDC GLUCOMTR-MCNC: 152 MG/DL — HIGH (ref 70–99)
GLUCOSE BLDC GLUCOMTR-MCNC: 161 MG/DL — HIGH (ref 70–99)
GLUCOSE BLDC GLUCOMTR-MCNC: 169 MG/DL — HIGH (ref 70–99)
GLUCOSE SERPL-MCNC: 169 MG/DL — HIGH (ref 70–99)
HCT VFR BLD CALC: 30.7 % — LOW (ref 34.5–45)
HEPARINASE TEG R TIME: 3.5 MIN (ref 4.3–8.3)
HEPARINASE TEG R TIME: 5.5 MIN — SIGNIFICANT CHANGE UP (ref 4.3–8.3)
HGB BLD-MCNC: 10 G/DL — LOW (ref 11.5–15.5)
IMM GRANULOCYTES NFR BLD AUTO: 0.9 % — SIGNIFICANT CHANGE UP (ref 0–1.5)
INR BLD: 1.22 RATIO — HIGH (ref 0.88–1.16)
LYMPHOCYTES # BLD AUTO: 0.57 K/UL — LOW (ref 1–3.3)
LYMPHOCYTES # BLD AUTO: 8.1 % — LOW (ref 13–44)
MAGNESIUM SERPL-MCNC: 2.2 MG/DL — SIGNIFICANT CHANGE UP (ref 1.6–2.6)
MCHC RBC-ENTMCNC: 28.8 PG — SIGNIFICANT CHANGE UP (ref 27–34)
MCHC RBC-ENTMCNC: 32.6 GM/DL — SIGNIFICANT CHANGE UP (ref 32–36)
MCV RBC AUTO: 88.5 FL — SIGNIFICANT CHANGE UP (ref 80–100)
MONOCYTES # BLD AUTO: 0.38 K/UL — SIGNIFICANT CHANGE UP (ref 0–0.9)
MONOCYTES NFR BLD AUTO: 5.4 % — SIGNIFICANT CHANGE UP (ref 2–14)
MRSA PCR RESULT.: SIGNIFICANT CHANGE UP
NEUTROPHILS # BLD AUTO: 5.97 K/UL — SIGNIFICANT CHANGE UP (ref 1.8–7.4)
NEUTROPHILS NFR BLD AUTO: 85.1 % — HIGH (ref 43–77)
NRBC # BLD: 0 /100 WBCS — SIGNIFICANT CHANGE UP (ref 0–0)
PHOSPHATE SERPL-MCNC: 3.8 MG/DL — SIGNIFICANT CHANGE UP (ref 2.5–4.5)
PLATELET # BLD AUTO: 140 K/UL — LOW (ref 150–400)
POTASSIUM SERPL-MCNC: 4.6 MMOL/L — SIGNIFICANT CHANGE UP (ref 3.5–5.3)
POTASSIUM SERPL-SCNC: 4.6 MMOL/L — SIGNIFICANT CHANGE UP (ref 3.5–5.3)
PROT SERPL-MCNC: 5.1 G/DL — LOW (ref 6–8.3)
PROTHROM AB SERPL-ACNC: 14.2 SEC — HIGH (ref 10.5–13.4)
RAPIDTEG MAXIMUM AMPLITUDE: 55.3 MM — SIGNIFICANT CHANGE UP (ref 52–70)
RAPIDTEG MAXIMUM AMPLITUDE: 62.6 MM — SIGNIFICANT CHANGE UP (ref 52–70)
RBC # BLD: 3.47 M/UL — LOW (ref 3.8–5.2)
RBC # FLD: 12.4 % — SIGNIFICANT CHANGE UP (ref 10.3–14.5)
S AUREUS DNA NOSE QL NAA+PROBE: SIGNIFICANT CHANGE UP
SODIUM SERPL-SCNC: 138 MMOL/L — SIGNIFICANT CHANGE UP (ref 135–145)
TEG FUNCTIONAL FIBRINOGEN: 18.9 MM — SIGNIFICANT CHANGE UP (ref 15–32)
TEG FUNCTIONAL FIBRINOGEN: 22.3 MM — SIGNIFICANT CHANGE UP (ref 15–32)
TEG MAXIMUM AMPLITUDE: 57.5 MM — SIGNIFICANT CHANGE UP (ref 52–69)
TEG MAXIMUM AMPLITUDE: 62.4 MM — SIGNIFICANT CHANGE UP (ref 52–69)
TEG REACTION TIME: 3.7 MIN (ref 4.6–9.1)
TEG REACTION TIME: 6.1 MIN — SIGNIFICANT CHANGE UP (ref 4.6–9.1)
TROPONIN T, HIGH SENSITIVITY RESULT: 108 NG/L — HIGH (ref 0–51)
WBC # BLD: 7.02 K/UL — SIGNIFICANT CHANGE UP (ref 3.8–10.5)
WBC # FLD AUTO: 7.02 K/UL — SIGNIFICANT CHANGE UP (ref 3.8–10.5)

## 2022-04-01 PROCEDURE — 93010 ELECTROCARDIOGRAM REPORT: CPT

## 2022-04-01 PROCEDURE — 33120 EXC ICAR TUM RESC W/CARD BYP: CPT

## 2022-04-01 PROCEDURE — 99291 CRITICAL CARE FIRST HOUR: CPT

## 2022-04-01 PROCEDURE — 88305 TISSUE EXAM BY PATHOLOGIST: CPT | Mod: 26

## 2022-04-01 DEVICE — CANNULA VENOUS 1 STAGE RIGHT ANGLE METAL TIP 24FR X 3/8": Type: IMPLANTABLE DEVICE | Status: FUNCTIONAL

## 2022-04-01 DEVICE — CANNULA ARTERIAL STRAIGHT 20FR X 3/8" VENTED: Type: IMPLANTABLE DEVICE | Status: FUNCTIONAL

## 2022-04-01 DEVICE — PACING WIRE BLUE DARK 2-0 24" SH SKS-3: Type: IMPLANTABLE DEVICE | Status: FUNCTIONAL

## 2022-04-01 DEVICE — CANNULA VENOUS 1 STAGE STRAIGHT 32FR X 3/8": Type: IMPLANTABLE DEVICE | Status: FUNCTIONAL

## 2022-04-01 DEVICE — LIGATING CLIPS WECK HORIZON MEDIUM (BLUE) 24: Type: IMPLANTABLE DEVICE | Status: FUNCTIONAL

## 2022-04-01 DEVICE — KIT CVC 2LUM MAC 9FR CHG: Type: IMPLANTABLE DEVICE | Status: FUNCTIONAL

## 2022-04-01 DEVICE — LIGATING CLIPS WECK HORIZON SMALL-WIDE (RED) 24: Type: IMPLANTABLE DEVICE | Status: FUNCTIONAL

## 2022-04-01 DEVICE — CANNULA AORTIC ROOT 14G X 14CM FLANGED: Type: IMPLANTABLE DEVICE | Status: FUNCTIONAL

## 2022-04-01 DEVICE — CHEST DRAIN THORACIC ARGYLE PVC 28FR RIGHT ANGLE: Type: IMPLANTABLE DEVICE | Status: FUNCTIONAL

## 2022-04-01 DEVICE — CHEST DRAIN THORACIC ARGYLE PVC 36FR STRAIGHT: Type: IMPLANTABLE DEVICE | Status: FUNCTIONAL

## 2022-04-01 DEVICE — KIT A-LINE 1LUM 20G X 12CM SAFE KIT: Type: IMPLANTABLE DEVICE | Status: FUNCTIONAL

## 2022-04-01 RX ORDER — ACETAMINOPHEN 500 MG
1000 TABLET ORAL ONCE
Refills: 0 | Status: COMPLETED | OUTPATIENT
Start: 2022-04-01 | End: 2022-04-01

## 2022-04-01 RX ORDER — AMIODARONE HYDROCHLORIDE 400 MG/1
400 TABLET ORAL
Refills: 0 | Status: DISCONTINUED | OUTPATIENT
Start: 2022-04-01 | End: 2022-04-02

## 2022-04-01 RX ORDER — KETOROLAC TROMETHAMINE 30 MG/ML
15 SYRINGE (ML) INJECTION EVERY 6 HOURS
Refills: 0 | Status: DISCONTINUED | OUTPATIENT
Start: 2022-04-01 | End: 2022-04-02

## 2022-04-01 RX ORDER — FENTANYL CITRATE 50 UG/ML
50 INJECTION INTRAVENOUS ONCE
Refills: 0 | Status: DISCONTINUED | OUTPATIENT
Start: 2022-04-01 | End: 2022-04-01

## 2022-04-01 RX ORDER — POTASSIUM CHLORIDE 20 MEQ
10 PACKET (EA) ORAL
Refills: 0 | Status: DISCONTINUED | OUTPATIENT
Start: 2022-04-01 | End: 2022-04-02

## 2022-04-01 RX ORDER — CHLORHEXIDINE GLUCONATE 213 G/1000ML
15 SOLUTION TOPICAL EVERY 12 HOURS
Refills: 0 | Status: DISCONTINUED | OUTPATIENT
Start: 2022-04-01 | End: 2022-04-01

## 2022-04-01 RX ORDER — ACETAMINOPHEN 500 MG
650 TABLET ORAL EVERY 6 HOURS
Refills: 0 | Status: DISCONTINUED | OUTPATIENT
Start: 2022-04-01 | End: 2022-04-02

## 2022-04-01 RX ORDER — CEFUROXIME AXETIL 250 MG
1500 TABLET ORAL EVERY 8 HOURS
Refills: 0 | Status: COMPLETED | OUTPATIENT
Start: 2022-04-01 | End: 2022-04-02

## 2022-04-01 RX ORDER — ASPIRIN/CALCIUM CARB/MAGNESIUM 324 MG
81 TABLET ORAL DAILY
Refills: 0 | Status: DISCONTINUED | OUTPATIENT
Start: 2022-04-01 | End: 2022-04-05

## 2022-04-01 RX ORDER — METOCLOPRAMIDE HCL 10 MG
10 TABLET ORAL EVERY 8 HOURS
Refills: 0 | Status: DISCONTINUED | OUTPATIENT
Start: 2022-04-01 | End: 2022-04-05

## 2022-04-01 RX ORDER — CEFUROXIME AXETIL 250 MG
1500 TABLET ORAL EVERY 8 HOURS
Refills: 0 | Status: DISCONTINUED | OUTPATIENT
Start: 2022-04-01 | End: 2022-04-01

## 2022-04-01 RX ORDER — CHLORHEXIDINE GLUCONATE 213 G/1000ML
1 SOLUTION TOPICAL DAILY
Refills: 0 | Status: DISCONTINUED | OUTPATIENT
Start: 2022-04-01 | End: 2022-04-03

## 2022-04-01 RX ORDER — HYDROMORPHONE HYDROCHLORIDE 2 MG/ML
0.5 INJECTION INTRAMUSCULAR; INTRAVENOUS; SUBCUTANEOUS EVERY 6 HOURS
Refills: 0 | Status: DISCONTINUED | OUTPATIENT
Start: 2022-04-01 | End: 2022-04-01

## 2022-04-01 RX ORDER — DEXTROSE 50 % IN WATER 50 %
50 SYRINGE (ML) INTRAVENOUS
Refills: 0 | Status: DISCONTINUED | OUTPATIENT
Start: 2022-04-01 | End: 2022-04-02

## 2022-04-01 RX ORDER — PANTOPRAZOLE SODIUM 20 MG/1
40 TABLET, DELAYED RELEASE ORAL DAILY
Refills: 0 | Status: DISCONTINUED | OUTPATIENT
Start: 2022-04-01 | End: 2022-04-02

## 2022-04-01 RX ORDER — KETOROLAC TROMETHAMINE 30 MG/ML
15 SYRINGE (ML) INJECTION ONCE
Refills: 0 | Status: DISCONTINUED | OUTPATIENT
Start: 2022-04-01 | End: 2022-04-01

## 2022-04-01 RX ORDER — MUPIROCIN 20 MG/G
1 OINTMENT TOPICAL
Refills: 0 | Status: DISCONTINUED | OUTPATIENT
Start: 2022-04-01 | End: 2022-04-05

## 2022-04-01 RX ORDER — OXYCODONE HYDROCHLORIDE 5 MG/1
5 TABLET ORAL EVERY 4 HOURS
Refills: 0 | Status: DISCONTINUED | OUTPATIENT
Start: 2022-04-01 | End: 2022-04-02

## 2022-04-01 RX ORDER — SENNA PLUS 8.6 MG/1
2 TABLET ORAL AT BEDTIME
Refills: 0 | Status: DISCONTINUED | OUTPATIENT
Start: 2022-04-02 | End: 2022-04-05

## 2022-04-01 RX ORDER — INSULIN HUMAN 100 [IU]/ML
3 INJECTION, SOLUTION SUBCUTANEOUS
Qty: 100 | Refills: 0 | Status: DISCONTINUED | OUTPATIENT
Start: 2022-04-01 | End: 2022-04-02

## 2022-04-01 RX ORDER — ONDANSETRON 8 MG/1
4 TABLET, FILM COATED ORAL ONCE
Refills: 0 | Status: COMPLETED | OUTPATIENT
Start: 2022-04-01 | End: 2022-04-01

## 2022-04-01 RX ORDER — METOPROLOL TARTRATE 50 MG
25 TABLET ORAL EVERY 12 HOURS
Refills: 0 | Status: DISCONTINUED | OUTPATIENT
Start: 2022-04-02 | End: 2022-04-03

## 2022-04-01 RX ORDER — FENTANYL CITRATE 50 UG/ML
12.5 INJECTION INTRAVENOUS ONCE
Refills: 0 | Status: DISCONTINUED | OUTPATIENT
Start: 2022-04-01 | End: 2022-04-01

## 2022-04-01 RX ORDER — SODIUM CHLORIDE 9 MG/ML
500 INJECTION, SOLUTION INTRAVENOUS ONCE
Refills: 0 | Status: COMPLETED | OUTPATIENT
Start: 2022-04-01 | End: 2022-04-01

## 2022-04-01 RX ORDER — METOCLOPRAMIDE HCL 10 MG
10 TABLET ORAL EVERY 8 HOURS
Refills: 0 | Status: DISCONTINUED | OUTPATIENT
Start: 2022-04-01 | End: 2022-04-02

## 2022-04-01 RX ORDER — NICARDIPINE HYDROCHLORIDE 30 MG/1
5 CAPSULE, EXTENDED RELEASE ORAL
Qty: 40 | Refills: 0 | Status: DISCONTINUED | OUTPATIENT
Start: 2022-04-01 | End: 2022-04-02

## 2022-04-01 RX ORDER — GABAPENTIN 400 MG/1
100 CAPSULE ORAL EVERY 8 HOURS
Refills: 0 | Status: DISCONTINUED | OUTPATIENT
Start: 2022-04-01 | End: 2022-04-02

## 2022-04-01 RX ORDER — ATORVASTATIN CALCIUM 80 MG/1
80 TABLET, FILM COATED ORAL AT BEDTIME
Refills: 0 | Status: DISCONTINUED | OUTPATIENT
Start: 2022-04-01 | End: 2022-04-02

## 2022-04-01 RX ORDER — DEXTROSE 50 % IN WATER 50 %
25 SYRINGE (ML) INTRAVENOUS
Refills: 0 | Status: DISCONTINUED | OUTPATIENT
Start: 2022-04-01 | End: 2022-04-02

## 2022-04-01 RX ORDER — SODIUM CHLORIDE 9 MG/ML
1000 INJECTION INTRAMUSCULAR; INTRAVENOUS; SUBCUTANEOUS
Refills: 0 | Status: DISCONTINUED | OUTPATIENT
Start: 2022-04-01 | End: 2022-04-03

## 2022-04-01 RX ORDER — POLYETHYLENE GLYCOL 3350 17 G/17G
17 POWDER, FOR SOLUTION ORAL DAILY
Refills: 0 | Status: DISCONTINUED | OUTPATIENT
Start: 2022-04-02 | End: 2022-04-05

## 2022-04-01 RX ORDER — FENTANYL CITRATE 50 UG/ML
15 INJECTION INTRAVENOUS ONCE
Refills: 0 | Status: DISCONTINUED | OUTPATIENT
Start: 2022-04-01 | End: 2022-04-01

## 2022-04-01 RX ORDER — OXYCODONE HYDROCHLORIDE 5 MG/1
10 TABLET ORAL EVERY 4 HOURS
Refills: 0 | Status: DISCONTINUED | OUTPATIENT
Start: 2022-04-01 | End: 2022-04-02

## 2022-04-01 RX ORDER — POTASSIUM CHLORIDE 20 MEQ
10 PACKET (EA) ORAL
Refills: 0 | Status: COMPLETED | OUTPATIENT
Start: 2022-04-01 | End: 2022-04-01

## 2022-04-01 RX ORDER — ASCORBIC ACID 60 MG
500 TABLET,CHEWABLE ORAL
Refills: 0 | Status: DISCONTINUED | OUTPATIENT
Start: 2022-04-01 | End: 2022-04-02

## 2022-04-01 RX ADMIN — GABAPENTIN 100 MILLIGRAM(S): 400 CAPSULE ORAL at 14:22

## 2022-04-01 RX ADMIN — INSULIN HUMAN 3 UNIT(S)/HR: 100 INJECTION, SOLUTION SUBCUTANEOUS at 13:00

## 2022-04-01 RX ADMIN — NICARDIPINE HYDROCHLORIDE 25 MG/HR: 30 CAPSULE, EXTENDED RELEASE ORAL at 17:14

## 2022-04-01 RX ADMIN — HYDROMORPHONE HYDROCHLORIDE 0.5 MILLIGRAM(S): 2 INJECTION INTRAMUSCULAR; INTRAVENOUS; SUBCUTANEOUS at 12:29

## 2022-04-01 RX ADMIN — PANTOPRAZOLE SODIUM 40 MILLIGRAM(S): 20 TABLET, DELAYED RELEASE ORAL at 14:22

## 2022-04-01 RX ADMIN — Medication 500 MILLIGRAM(S): at 18:30

## 2022-04-01 RX ADMIN — CHLORHEXIDINE GLUCONATE 30 MILLILITER(S): 213 SOLUTION TOPICAL at 05:54

## 2022-04-01 RX ADMIN — MUPIROCIN 1 APPLICATION(S): 20 OINTMENT TOPICAL at 05:52

## 2022-04-01 RX ADMIN — Medication 15 MILLIGRAM(S): at 18:32

## 2022-04-01 RX ADMIN — GABAPENTIN 300 MILLIGRAM(S): 400 CAPSULE ORAL at 05:51

## 2022-04-01 RX ADMIN — HYDROMORPHONE HYDROCHLORIDE 0.5 MILLIGRAM(S): 2 INJECTION INTRAMUSCULAR; INTRAVENOUS; SUBCUTANEOUS at 12:45

## 2022-04-01 RX ADMIN — Medication 100 MILLIEQUIVALENT(S): at 23:31

## 2022-04-01 RX ADMIN — Medication 10 MILLIGRAM(S): at 19:28

## 2022-04-01 RX ADMIN — Medication 15 MILLIGRAM(S): at 13:52

## 2022-04-01 RX ADMIN — FENTANYL CITRATE 12.5 MICROGRAM(S): 50 INJECTION INTRAVENOUS at 16:00

## 2022-04-01 RX ADMIN — Medication 100 MILLIEQUIVALENT(S): at 22:29

## 2022-04-01 RX ADMIN — Medication 1000 MILLIGRAM(S): at 13:40

## 2022-04-01 RX ADMIN — AMIODARONE HYDROCHLORIDE 400 MILLIGRAM(S): 400 TABLET ORAL at 18:30

## 2022-04-01 RX ADMIN — Medication 10 MILLIGRAM(S): at 23:00

## 2022-04-01 RX ADMIN — SODIUM CHLORIDE 3 MILLILITER(S): 9 INJECTION INTRAMUSCULAR; INTRAVENOUS; SUBCUTANEOUS at 05:54

## 2022-04-01 RX ADMIN — MUPIROCIN 1 APPLICATION(S): 20 OINTMENT TOPICAL at 18:42

## 2022-04-01 RX ADMIN — Medication 1000 MILLIGRAM(S): at 05:52

## 2022-04-01 RX ADMIN — FENTANYL CITRATE 15 MICROGRAM(S): 50 INJECTION INTRAVENOUS at 14:00

## 2022-04-01 RX ADMIN — SODIUM CHLORIDE 1000 MILLILITER(S): 9 INJECTION, SOLUTION INTRAVENOUS at 12:21

## 2022-04-01 RX ADMIN — Medication 100 MILLIEQUIVALENT(S): at 23:00

## 2022-04-01 RX ADMIN — Medication 15 MILLIGRAM(S): at 23:00

## 2022-04-01 RX ADMIN — Medication 100 MILLIGRAM(S): at 15:36

## 2022-04-01 RX ADMIN — FENTANYL CITRATE 15 MICROGRAM(S): 50 INJECTION INTRAVENOUS at 13:39

## 2022-04-01 RX ADMIN — GABAPENTIN 100 MILLIGRAM(S): 400 CAPSULE ORAL at 22:18

## 2022-04-01 RX ADMIN — FENTANYL CITRATE 12.5 MICROGRAM(S): 50 INJECTION INTRAVENOUS at 16:15

## 2022-04-01 RX ADMIN — Medication 15 MILLIGRAM(S): at 14:00

## 2022-04-01 RX ADMIN — Medication 37.5 MILLIGRAM(S): at 05:52

## 2022-04-01 RX ADMIN — Medication 15 MILLIGRAM(S): at 19:07

## 2022-04-01 RX ADMIN — CHLORHEXIDINE GLUCONATE 1 APPLICATION(S): 213 SOLUTION TOPICAL at 05:54

## 2022-04-01 RX ADMIN — Medication 15 MILLIGRAM(S): at 23:30

## 2022-04-01 RX ADMIN — Medication 650 MILLIGRAM(S): at 18:31

## 2022-04-01 RX ADMIN — ONDANSETRON 4 MILLIGRAM(S): 8 TABLET, FILM COATED ORAL at 12:05

## 2022-04-01 RX ADMIN — Medication 400 MILLIGRAM(S): at 12:45

## 2022-04-01 NOTE — PRE-ANESTHESIA EVALUATION ADULT - HEIGHT IN CM

## 2022-04-01 NOTE — PROGRESS NOTE ADULT - SUBJECTIVE AND OBJECTIVE BOX
Patient seen and examined at the bedside.    Remained critically ill on continuous ICU monitoring.    OBJECTIVE:  Vital Signs Last 24 Hrs  T(C): 36.9 (01 Apr 2022 20:00), Max: 36.9 (01 Apr 2022 20:00)  T(F): 98.4 (01 Apr 2022 20:00), Max: 98.4 (01 Apr 2022 20:00)  HR: 84 (01 Apr 2022 20:30) (69 - 110)  BP: 141/79 (01 Apr 2022 18:00) (101/64 - 149/96)  BP(mean): 104 (01 Apr 2022 18:00) (75 - 114)  RR: 16 (01 Apr 2022 20:30) (10 - 31)  SpO2: 95% (01 Apr 2022 20:30) (91% - 100%)    REVIEW OF SYSTEMS:    + incisional chest pain     Physical Exam:  General:  multiple lines gtt & tubes   Neurology: Awake f/u commands moves all 4 on commands   Eyes: bilaeral pupils equal and reactive   ENT/Neck: Neck supple, trachea midline, No JVD   Respiratory: Rales noted bilaterally   CV: RRR, S1S2, no murmurs        [x] Sternal dressing, [x] Mediastinal CT x2         [x] Sinus rhythm,   Abdominal: Soft, NT, ND +BS,   Extremities: 1-2+ pedal edema noted, + peripheral pulses   Skin: No Rashes, Hematoma, Ecchymosis                           Assessment:  77 yr F H/O HTN / HLD / Spinal disc disease / GERD/   R Atrial mass  S/P Sternotomy R atriotomy atrial myxoma resection POD # 0  Post intra operative extubation complicated with respiratory acidosis   Post op labile BP  Severe HTN requiring  vasodilators  Prediabetes AIC 6.3 / post op hyperglycemia    Plan:   ***Neuro***     [x] Nonfocal    Post operative neuro assessment     ***Cardiovascular***  Invasive hemodynamic monitoring, assess perfusion indices   SR 70-80  / CVP 4-6  / MAP 70-90 /  Hct 30 / Lactate 0.9   Cardene gtt 5-10 mg/hr   Volume: [x] Crystalloid 500 x2  Reassessment of hemodynamics post resuscitation   Monitor chest tube outputs   [x] Nonbleeding   [x] ASA  [x] Statin   Serial EKG and cardiac enzymes     ***Pulmonary***  Arrived extubated in ICU   Post extubation Respiratory acidosis  PH 7.29 / PCO2 56  Titration of FiO2 follow SpO2, CXR, blood gases  Avoid excessive Narcotics / BiPAP   Lung expansion maneuvers                      ***GI***  [x] NPO /   [x] Protonix      ***Renal***  GFR 94   Continue to monitor I/Os, BUN/Creatinine.   Replete lytes PRN  Lewis present  [ x] positive     ***ID*  Perioperative antibiotics     ***Endocrine***  [x] Hyperglycemia   [x] Prediabetes : HbA1c 6.3%               - [x] Insulin gtt               - Need tight glycemic control to prevent wound infection.      ALL MEDICATIONS (delete after use)  MEDICATIONS  (STANDING):  acetaminophen     Tablet .. 650 milliGRAM(s) Oral every 6 hours  aMIOdarone    Tablet 400 milliGRAM(s) Oral two times a day  ascorbic acid 500 milliGRAM(s) Oral two times a day  aspirin enteric coated 81 milliGRAM(s) Oral daily  cefuroxime  IVPB 1500 milliGRAM(s) IV Intermittent every 8 hours  chlorhexidine 2% Cloths 1 Application(s) Topical daily  dextrose 50% Injectable 50 milliLiter(s) IV Push every 15 minutes  dextrose 50% Injectable 25 milliLiter(s) IV Push every 15 minutes  gabapentin 100 milliGRAM(s) Oral every 8 hours  insulin regular Infusion 3 Unit(s)/Hr (3 mL/Hr) IV Continuous <Continuous>  ketorolac   Injectable 15 milliGRAM(s) IV Push every 6 hours  metoclopramide Injectable 10 milliGRAM(s) IV Push every 8 hours  mupirocin 2% Ointment 1 Application(s) Topical two times a day  niCARdipine Infusion 5 mG/Hr (25 mL/Hr) IV Continuous <Continuous>  pantoprazole  Injectable 40 milliGRAM(s) IV Push daily  potassium chloride  10 mEq/50 mL IVPB 10 milliEquivalent(s) IV Intermittent every 1 hour  potassium chloride  10 mEq/50 mL IVPB 10 milliEquivalent(s) IV Intermittent every 1 hour  potassium chloride  10 mEq/50 mL IVPB 10 milliEquivalent(s) IV Intermittent every 1 hour  sodium chloride 0.9%. 1000 milliLiter(s) (10 mL/Hr) IV Continuous <Continuous>    MEDICATIONS  (PRN):  oxyCODONE    IR 5 milliGRAM(s) Oral every 4 hours PRN Moderate Pain (4 - 6)  oxyCODONE    IR 10 milliGRAM(s) Oral every 4 hours PRN Severe Pain (7 - 10)      Patient requires continuous monitoring with bedside rhythm monitoring, pulse oximetry monitoring, and continuous central venous and arterial pressure monitoring; and intermittent blood gas analysis. Care plan discussed with the ICU care team.   Patient remained critical, at risk for life threatening decompensation.    I have spent 30 minutes providing critical care management to this patient.      I, Sagar Bah, personally performed the services described in this documentation. all medical record entries made by the scribe were at my direction and in my presence. I have reviewed the chart and agree that the record reflects my personal performance and is accurate and complete  Electronically signed: Sagar Bah MD

## 2022-04-02 LAB
ALBUMIN SERPL ELPH-MCNC: 3.6 G/DL — SIGNIFICANT CHANGE UP (ref 3.3–5)
ALP SERPL-CCNC: 92 U/L — SIGNIFICANT CHANGE UP (ref 40–120)
ALT FLD-CCNC: 11 U/L — SIGNIFICANT CHANGE UP (ref 10–45)
AMYLASE P1 CFR SERPL: 41 U/L — SIGNIFICANT CHANGE UP (ref 25–125)
ANION GAP SERPL CALC-SCNC: 11 MMOL/L — SIGNIFICANT CHANGE UP (ref 5–17)
AST SERPL-CCNC: 21 U/L — SIGNIFICANT CHANGE UP (ref 10–40)
BILIRUB SERPL-MCNC: 0.2 MG/DL — SIGNIFICANT CHANGE UP (ref 0.2–1.2)
BUN SERPL-MCNC: 15 MG/DL — SIGNIFICANT CHANGE UP (ref 7–23)
CALCIUM SERPL-MCNC: 8.7 MG/DL — SIGNIFICANT CHANGE UP (ref 8.4–10.5)
CHLORIDE SERPL-SCNC: 103 MMOL/L — SIGNIFICANT CHANGE UP (ref 96–108)
CO2 SERPL-SCNC: 23 MMOL/L — SIGNIFICANT CHANGE UP (ref 22–31)
CREAT SERPL-MCNC: 0.6 MG/DL — SIGNIFICANT CHANGE UP (ref 0.5–1.3)
EGFR: 94 ML/MIN/1.73M2 — SIGNIFICANT CHANGE UP
GAS PNL BLDA: SIGNIFICANT CHANGE UP
GAS PNL BLDA: SIGNIFICANT CHANGE UP
GLUCOSE BLDC GLUCOMTR-MCNC: 115 MG/DL — HIGH (ref 70–99)
GLUCOSE BLDC GLUCOMTR-MCNC: 115 MG/DL — HIGH (ref 70–99)
GLUCOSE BLDC GLUCOMTR-MCNC: 126 MG/DL — HIGH (ref 70–99)
GLUCOSE BLDC GLUCOMTR-MCNC: 127 MG/DL — HIGH (ref 70–99)
GLUCOSE BLDC GLUCOMTR-MCNC: 127 MG/DL — HIGH (ref 70–99)
GLUCOSE BLDC GLUCOMTR-MCNC: 134 MG/DL — HIGH (ref 70–99)
GLUCOSE BLDC GLUCOMTR-MCNC: 140 MG/DL — HIGH (ref 70–99)
GLUCOSE BLDC GLUCOMTR-MCNC: 144 MG/DL — HIGH (ref 70–99)
GLUCOSE BLDC GLUCOMTR-MCNC: 144 MG/DL — HIGH (ref 70–99)
GLUCOSE BLDC GLUCOMTR-MCNC: 148 MG/DL — HIGH (ref 70–99)
GLUCOSE BLDC GLUCOMTR-MCNC: 166 MG/DL — HIGH (ref 70–99)
GLUCOSE BLDC GLUCOMTR-MCNC: 171 MG/DL — HIGH (ref 70–99)
GLUCOSE BLDC GLUCOMTR-MCNC: 186 MG/DL — HIGH (ref 70–99)
GLUCOSE SERPL-MCNC: 123 MG/DL — HIGH (ref 70–99)
HCT VFR BLD CALC: 31.4 % — LOW (ref 34.5–45)
HGB BLD-MCNC: 10.3 G/DL — LOW (ref 11.5–15.5)
LIDOCAIN IGE QN: 7 U/L — SIGNIFICANT CHANGE UP (ref 7–60)
MAGNESIUM SERPL-MCNC: 1.9 MG/DL — SIGNIFICANT CHANGE UP (ref 1.6–2.6)
MCHC RBC-ENTMCNC: 29 PG — SIGNIFICANT CHANGE UP (ref 27–34)
MCHC RBC-ENTMCNC: 32.8 GM/DL — SIGNIFICANT CHANGE UP (ref 32–36)
MCV RBC AUTO: 88.5 FL — SIGNIFICANT CHANGE UP (ref 80–100)
NRBC # BLD: 0 /100 WBCS — SIGNIFICANT CHANGE UP (ref 0–0)
PHOSPHATE SERPL-MCNC: 3.6 MG/DL — SIGNIFICANT CHANGE UP (ref 2.5–4.5)
PLATELET # BLD AUTO: 166 K/UL — SIGNIFICANT CHANGE UP (ref 150–400)
POTASSIUM SERPL-MCNC: 4.6 MMOL/L — SIGNIFICANT CHANGE UP (ref 3.5–5.3)
POTASSIUM SERPL-SCNC: 4.6 MMOL/L — SIGNIFICANT CHANGE UP (ref 3.5–5.3)
PROT SERPL-MCNC: 5.6 G/DL — LOW (ref 6–8.3)
RBC # BLD: 3.55 M/UL — LOW (ref 3.8–5.2)
RBC # FLD: 12.5 % — SIGNIFICANT CHANGE UP (ref 10.3–14.5)
SODIUM SERPL-SCNC: 137 MMOL/L — SIGNIFICANT CHANGE UP (ref 135–145)
WBC # BLD: 10.66 K/UL — HIGH (ref 3.8–10.5)
WBC # FLD AUTO: 10.66 K/UL — HIGH (ref 3.8–10.5)

## 2022-04-02 PROCEDURE — 93010 ELECTROCARDIOGRAM REPORT: CPT

## 2022-04-02 PROCEDURE — 71045 X-RAY EXAM CHEST 1 VIEW: CPT | Mod: 26

## 2022-04-02 PROCEDURE — 99291 CRITICAL CARE FIRST HOUR: CPT

## 2022-04-02 RX ORDER — ALBUMIN HUMAN 25 %
250 VIAL (ML) INTRAVENOUS ONCE
Refills: 0 | Status: COMPLETED | OUTPATIENT
Start: 2022-04-02 | End: 2022-04-02

## 2022-04-02 RX ORDER — ACETAMINOPHEN 500 MG
1000 TABLET ORAL ONCE
Refills: 0 | Status: COMPLETED | OUTPATIENT
Start: 2022-04-02 | End: 2022-04-02

## 2022-04-02 RX ORDER — INSULIN LISPRO 100/ML
VIAL (ML) SUBCUTANEOUS AT BEDTIME
Refills: 0 | Status: DISCONTINUED | OUTPATIENT
Start: 2022-04-02 | End: 2022-04-05

## 2022-04-02 RX ORDER — KETOROLAC TROMETHAMINE 30 MG/ML
15 SYRINGE (ML) INJECTION ONCE
Refills: 0 | Status: DISCONTINUED | OUTPATIENT
Start: 2022-04-02 | End: 2022-04-02

## 2022-04-02 RX ORDER — PANTOPRAZOLE SODIUM 20 MG/1
40 TABLET, DELAYED RELEASE ORAL
Refills: 0 | Status: DISCONTINUED | OUTPATIENT
Start: 2022-04-02 | End: 2022-04-03

## 2022-04-02 RX ORDER — HYDROMORPHONE HYDROCHLORIDE 2 MG/ML
0.5 INJECTION INTRAMUSCULAR; INTRAVENOUS; SUBCUTANEOUS ONCE
Refills: 0 | Status: DISCONTINUED | OUTPATIENT
Start: 2022-04-02 | End: 2022-04-02

## 2022-04-02 RX ORDER — ONDANSETRON 8 MG/1
4 TABLET, FILM COATED ORAL ONCE
Refills: 0 | Status: COMPLETED | OUTPATIENT
Start: 2022-04-02 | End: 2022-04-02

## 2022-04-02 RX ORDER — ENOXAPARIN SODIUM 100 MG/ML
40 INJECTION SUBCUTANEOUS EVERY 24 HOURS
Refills: 0 | Status: DISCONTINUED | OUTPATIENT
Start: 2022-04-02 | End: 2022-04-05

## 2022-04-02 RX ORDER — ONDANSETRON 8 MG/1
10 TABLET, FILM COATED ORAL ONCE
Refills: 0 | Status: DISCONTINUED | OUTPATIENT
Start: 2022-04-02 | End: 2022-04-02

## 2022-04-02 RX ORDER — PANTOPRAZOLE SODIUM 20 MG/1
40 TABLET, DELAYED RELEASE ORAL ONCE
Refills: 0 | Status: COMPLETED | OUTPATIENT
Start: 2022-04-02 | End: 2022-04-02

## 2022-04-02 RX ORDER — INSULIN LISPRO 100/ML
VIAL (ML) SUBCUTANEOUS
Refills: 0 | Status: DISCONTINUED | OUTPATIENT
Start: 2022-04-02 | End: 2022-04-05

## 2022-04-02 RX ORDER — FENTANYL CITRATE 50 UG/ML
25 INJECTION INTRAVENOUS ONCE
Refills: 0 | Status: DISCONTINUED | OUTPATIENT
Start: 2022-04-02 | End: 2022-04-02

## 2022-04-02 RX ORDER — ONDANSETRON 8 MG/1
8 TABLET, FILM COATED ORAL ONCE
Refills: 0 | Status: COMPLETED | OUTPATIENT
Start: 2022-04-02 | End: 2022-04-02

## 2022-04-02 RX ORDER — DIAZEPAM 5 MG
5 TABLET ORAL ONCE
Refills: 0 | Status: DISCONTINUED | OUTPATIENT
Start: 2022-04-02 | End: 2022-04-02

## 2022-04-02 RX ORDER — BENZOCAINE AND MENTHOL 5; 1 G/100ML; G/100ML
1 LIQUID ORAL EVERY 6 HOURS
Refills: 0 | Status: DISCONTINUED | OUTPATIENT
Start: 2022-04-02 | End: 2022-04-05

## 2022-04-02 RX ADMIN — PANTOPRAZOLE SODIUM 40 MILLIGRAM(S): 20 TABLET, DELAYED RELEASE ORAL at 07:20

## 2022-04-02 RX ADMIN — Medication 5 MILLIGRAM(S): at 20:21

## 2022-04-02 RX ADMIN — MUPIROCIN 1 APPLICATION(S): 20 OINTMENT TOPICAL at 06:01

## 2022-04-02 RX ADMIN — Medication 100 MILLIGRAM(S): at 07:20

## 2022-04-02 RX ADMIN — FENTANYL CITRATE 25 MICROGRAM(S): 50 INJECTION INTRAVENOUS at 15:30

## 2022-04-02 RX ADMIN — CHLORHEXIDINE GLUCONATE 1 APPLICATION(S): 213 SOLUTION TOPICAL at 11:02

## 2022-04-02 RX ADMIN — Medication 125 MILLILITER(S): at 11:45

## 2022-04-02 RX ADMIN — Medication 10 MILLIGRAM(S): at 13:54

## 2022-04-02 RX ADMIN — HYDROMORPHONE HYDROCHLORIDE 0.5 MILLIGRAM(S): 2 INJECTION INTRAMUSCULAR; INTRAVENOUS; SUBCUTANEOUS at 21:35

## 2022-04-02 RX ADMIN — FENTANYL CITRATE 25 MICROGRAM(S): 50 INJECTION INTRAVENOUS at 15:15

## 2022-04-02 RX ADMIN — Medication 30 MILLILITER(S): at 20:21

## 2022-04-02 RX ADMIN — Medication 650 MILLIGRAM(S): at 05:59

## 2022-04-02 RX ADMIN — FENTANYL CITRATE 50 MICROGRAM(S): 50 INJECTION INTRAVENOUS at 00:00

## 2022-04-02 RX ADMIN — Medication 15 MILLIGRAM(S): at 16:55

## 2022-04-02 RX ADMIN — ONDANSETRON 4 MILLIGRAM(S): 8 TABLET, FILM COATED ORAL at 00:00

## 2022-04-02 RX ADMIN — Medication 100 MILLIGRAM(S): at 23:53

## 2022-04-02 RX ADMIN — Medication 100 MILLIGRAM(S): at 16:50

## 2022-04-02 RX ADMIN — Medication 25 MILLIGRAM(S): at 06:00

## 2022-04-02 RX ADMIN — Medication 1000 MILLIGRAM(S): at 09:45

## 2022-04-02 RX ADMIN — Medication 400 MILLIGRAM(S): at 00:00

## 2022-04-02 RX ADMIN — PANTOPRAZOLE SODIUM 40 MILLIGRAM(S): 20 TABLET, DELAYED RELEASE ORAL at 20:21

## 2022-04-02 RX ADMIN — HYDROMORPHONE HYDROCHLORIDE 0.5 MILLIGRAM(S): 2 INJECTION INTRAMUSCULAR; INTRAVENOUS; SUBCUTANEOUS at 21:50

## 2022-04-02 RX ADMIN — Medication 10 MILLIGRAM(S): at 06:00

## 2022-04-02 RX ADMIN — Medication 1000 MILLIGRAM(S): at 16:55

## 2022-04-02 RX ADMIN — Medication 25 MILLIGRAM(S): at 17:31

## 2022-04-02 RX ADMIN — ONDANSETRON 4 MILLIGRAM(S): 8 TABLET, FILM COATED ORAL at 08:13

## 2022-04-02 RX ADMIN — BENZOCAINE AND MENTHOL 1 LOZENGE: 5; 1 LIQUID ORAL at 15:18

## 2022-04-02 RX ADMIN — Medication 400 MILLIGRAM(S): at 16:25

## 2022-04-02 RX ADMIN — MUPIROCIN 1 APPLICATION(S): 20 OINTMENT TOPICAL at 17:31

## 2022-04-02 RX ADMIN — SENNA PLUS 2 TABLET(S): 8.6 TABLET ORAL at 21:35

## 2022-04-02 RX ADMIN — Medication 15 MILLIGRAM(S): at 08:15

## 2022-04-02 RX ADMIN — HYDROMORPHONE HYDROCHLORIDE 0.5 MILLIGRAM(S): 2 INJECTION INTRAMUSCULAR; INTRAVENOUS; SUBCUTANEOUS at 05:00

## 2022-04-02 RX ADMIN — Medication 100 MILLIGRAM(S): at 00:01

## 2022-04-02 RX ADMIN — Medication 650 MILLIGRAM(S): at 06:30

## 2022-04-02 RX ADMIN — AMIODARONE HYDROCHLORIDE 400 MILLIGRAM(S): 400 TABLET ORAL at 06:00

## 2022-04-02 RX ADMIN — Medication 10 MILLIGRAM(S): at 21:35

## 2022-04-02 RX ADMIN — GABAPENTIN 100 MILLIGRAM(S): 400 CAPSULE ORAL at 06:00

## 2022-04-02 RX ADMIN — HYDROMORPHONE HYDROCHLORIDE 0.5 MILLIGRAM(S): 2 INJECTION INTRAMUSCULAR; INTRAVENOUS; SUBCUTANEOUS at 04:30

## 2022-04-02 RX ADMIN — Medication 15 MILLIGRAM(S): at 08:30

## 2022-04-02 RX ADMIN — Medication 400 MILLIGRAM(S): at 09:20

## 2022-04-02 RX ADMIN — Medication 1000 MILLIGRAM(S): at 00:30

## 2022-04-02 RX ADMIN — Medication 15 MILLIGRAM(S): at 16:24

## 2022-04-02 RX ADMIN — ONDANSETRON 8 MILLIGRAM(S): 8 TABLET, FILM COATED ORAL at 09:21

## 2022-04-02 RX ADMIN — ENOXAPARIN SODIUM 40 MILLIGRAM(S): 100 INJECTION SUBCUTANEOUS at 11:01

## 2022-04-02 RX ADMIN — FENTANYL CITRATE 50 MICROGRAM(S): 50 INJECTION INTRAVENOUS at 00:30

## 2022-04-02 RX ADMIN — ONDANSETRON 4 MILLIGRAM(S): 8 TABLET, FILM COATED ORAL at 17:31

## 2022-04-02 RX ADMIN — Medication 2: at 17:31

## 2022-04-02 NOTE — PHYSICAL THERAPY INITIAL EVALUATION ADULT - PERTINENT HX OF CURRENT PROBLEM, REHAB EVAL
78y/o F no implantable devices with h/o HTN, HLD, GERD, YIN, prediabetes, migraine (on Fiorcet and Valium), hair loss (on minoxidil) recently diagnosed right atrial myxoma diagnosed by Dr Kiko Hurtado. Pt with strong family hx of heart dz: grandmother  suddenly at age 65, brother  at 76 from MI, younger brother  from MI age at 59, 3rd brother with CABG.

## 2022-04-02 NOTE — PROGRESS NOTE ADULT - SUBJECTIVE AND OBJECTIVE BOX
Patient seen and examined at the bedside.    Remained critically ill on continuous ICU monitoring.    OBJECTIVE:  Vital Signs Last 24 Hrs  T(C): 37.8 (02 Apr 2022 04:00), Max: 38 (02 Apr 2022 00:00)  T(F): 100 (02 Apr 2022 04:00), Max: 100.4 (02 Apr 2022 00:00)  HR: 78 (02 Apr 2022 06:45) (69 - 110)  BP: 141/79 (01 Apr 2022 18:00) (101/64 - 149/96)  BP(mean): 104 (01 Apr 2022 18:00) (75 - 114)  RR: 8 (02 Apr 2022 06:45) (8 - 46)  SpO2: 98% (02 Apr 2022 06:45) (91% - 100%)      Physical Exam:   General: NAD  Neurology: Nonfocal  Eyes: bilateral pupils equal and reactive   ENT/Neck: Neck supple, trachea midline, No JVD   Respiratory: Clear bilaterally   CV: S1S2, no murmurs        [x] Sternal dressing, [x] Mediastinal CT        [x] Sinus rhythm  Abdominal: Soft, NT, ND +BS,   Extremities: 1-2+ pedal edema noted, + peripheral pulses   Skin: No Rashes, Hematoma, Ecchymosis                           Assessment:  77 year old  female no implantable devices with h/o HTN, HLD, GERD, YIN, prediabetes, migraine (on Fiorcet and Valium), hair loss (on minoxidil) recently diagnosed right atrial myxoma.    Atrial myxoma S/P resection of atrial myxoma on 4/1/22  Hypovolemic Shock   Post op respiratory insufficiency   Stress Hyperglycemia / Prediabetes   Acute blood loss anemia     Plan:   ***Neuro***  [x] Nonfocal  Post operative neuro assessment     ***Cardiovascular***  Invasive hemodynamic monitoring, assess perfusion indices   SR 78 / CVP 5 / MAP 84 /Hct 31/4 / Lactate 0.6  Reassessment of hemodynamics post resuscitation   [x] Lopressor for rate control   [x] Amiodarone for atrial fibrillation prophylaxis.   Monitor chest tube outputs   [x]  ASA [x] Statin   Serial EKG and cardiac enzymes     ***Pulmonary***  Comfortable on room air, SpO2 98%  Continue to monitor SpO2 via pulse oximetry  Encourage bedside spirometry                 ***GI***  [x]  Diet:  PO consistent carb diet.   [x] Protonix    Bowel regimen with Miralax and senna   Reglan for gut motility     ***Renal***  Continue to monitor I/Os, BUN/Creatinine.   Replete lytes PRN  Lewis present [x]  negative    ***ID***  Continue Cefuroxime for perioperative antibiotic coverage.    ***Endocrine***  [x] Stress Hyperglycemia [x] Prediabetes : HbA1c 6.1%                - [x] Insulin gtt               - Need tight glycemic control to prevent wound infection.        Patient requires continuous monitoring with bedside rhythm monitoring, pulse oximetry monitoring, and continuous central venous and arterial pressure monitoring; and intermittent blood gas analysis. Care plan discussed with the ICU care team.   Patient remained critical, at risk for life threatening decompensation.    I have spent 30 minutes providing critical care management to this patient.    By signing my name below, I, Yee Sagastume, attest that this documentation has been prepared under the direction and in the presence of Chuck Pineda MD   Electronically signed: Frances Wu, 04-02-22 @ 07:07    I, Chuck Pineda, personally performed the services described in this documentation. all medical record entries made by the scribe were at my direction and in my presence. I have reviewed the chart and agree that the record reflects my personal performance and is accurate and complete  Electronically signed: Chuck Pineda MD  Patient seen and examined at the bedside.    Remained critically ill on continuous ICU monitoring.    OBJECTIVE:  Vital Signs Last 24 Hrs  T(C): 37.8 (02 Apr 2022 04:00), Max: 38 (02 Apr 2022 00:00)  T(F): 100 (02 Apr 2022 04:00), Max: 100.4 (02 Apr 2022 00:00)  HR: 78 (02 Apr 2022 06:45) (69 - 110)  BP: 141/79 (01 Apr 2022 18:00) (101/64 - 149/96)  BP(mean): 104 (01 Apr 2022 18:00) (75 - 114)  RR: 8 (02 Apr 2022 06:45) (8 - 46)  SpO2: 98% (02 Apr 2022 06:45) (91% - 100%)      Physical Exam:   General: NAD  Neurology: Nonfocal  Eyes: bilateral pupils equal and reactive   ENT/Neck: Neck supple, trachea midline, No JVD   Respiratory: Clear bilaterally   CV: S1S2, no murmurs        [x] Sternal dressing, [x] Mediastinal CT        [x] Sinus rhythm  Abdominal: Soft, NT, ND +BS,   Extremities: 1-2+ pedal edema noted, + peripheral pulses   Skin: No Rashes, Hematoma, Ecchymosis                           Assessment:  77 year old  female no implantable devices with h/o HTN, HLD, GERD, YIN, prediabetes, migraine (on Fiorcet and Valium), hair loss (on minoxidil) recently diagnosed right atrial myxoma.    Atrial myxoma S/P resection of atrial myxoma on 4/1/22  Hypovolemic Shock   Post op respiratory insufficiency   Stress Hyperglycemia / Prediabetes   Acute blood loss anemia     Plan:   ***Neuro***  [x] Nonfocal  Post operative neuro assessment   Awaiting SD bed    ***Cardiovascular***  Invasive hemodynamic monitoring, assess perfusion indices   SR 78 / CVP 5 / MAP 84 /Hct 31/4 / Lactate 0.6  Reassessment of hemodynamics post resuscitation   [x] Lopressor for rate control   [x] Amiodarone for atrial fibrillation prophylaxis.   Monitor chest tube outputs   [x]  ASA [x] Statin   Serial EKG and cardiac enzymes     ***Pulmonary***  Comfortable on room air, SpO2 98%  Continue to monitor SpO2 via pulse oximetry  Encourage bedside spirometry                 ***GI***  [x]  Diet: PO consistent carb diet.   [x] Protonix    Bowel regimen with Miralax and senna   Reglan for gut motility     ***Renal***  Continue to monitor I/Os, BUN/Creatinine.   Replete lytes PRN  Lewis present [x]  negative    ***ID***  Continue Cefuroxime for perioperative antibiotic coverage.    ***Endocrine***  [x] Stress Hyperglycemia [x] Prediabetes : HbA1c 6.1%                - [x] Insulin gtt               - Need tight glycemic control to prevent wound infection.        Patient requires continuous monitoring with bedside rhythm monitoring, pulse oximetry monitoring, and continuous central venous and arterial pressure monitoring; and intermittent blood gas analysis. Care plan discussed with the ICU care team.   Patient remained critical, at risk for life threatening decompensation.    I have spent 30 minutes providing critical care management to this patient.    By signing my name below, I, Yee Sagastume, attest that this documentation has been prepared under the direction and in the presence of Chuck Pineda MD   Electronically signed: Frances Wu, 04-02-22 @ 07:07    I, Chuck Pineda, personally performed the services described in this documentation. all medical record entries made by the sebleibadonis were at my direction and in my presence. I have reviewed the chart and agree that the record reflects my personal performance and is accurate and complete  Electronically signed: Chuck Pineda MD

## 2022-04-02 NOTE — PHYSICAL THERAPY INITIAL EVALUATION ADULT - PHYSICAL ASSIST/NONPHYSICAL ASSIST: STAND/SIT, REHAB EVAL
SOAP Note Objective Information for 10/25/2019:    Pain Level (Scale 0-10)   10/9/2019 10/16/19 10/25/19   At Rest 2 2 0-1   With Use 2 3 1-2     Pain Description  Date 10/9/2019 10/25/19   Location hand Left ulnar hand   Pain Quality stiff Stiff and sluggish   Frequency intermittent   intermittent   Pain is worst  daytime, morning Morning, day   Exacerbated by  use UD, gripping   Relieved by cold Taping, self massage   Progression worsening improving       Wrist ROM 10/9/19  10/25/19      Non-  injured AROM  (PROM) Right Left Left       Ext 76 70 pre  70 post 70 pre  72 post       Flex 70 55 pre  55 post 42 pre  55 post       RD 15 15 15 pre (WNL)       UD 30 24 pre  24 post 25 pre       Sup WNL WNL NT       Pron   NT        Home Exercise Program:  Massage pisiform and ulnar wrist  Wrist ROM  Tendon gliding fist series  K-tape  Icing  Brace HS and heavy tasks    Next Visit:  US   STM  K-tape  ROM  Progress to gentle strengthening/stability (door frame pull backs, isometrics)    Please refer to the daily flowsheet for treatment today, total treatment time and time spent performing 1:1 timed codes.          verbal cues/1 person assist

## 2022-04-02 NOTE — PHYSICAL THERAPY INITIAL EVALUATION ADULT - ADDITIONAL COMMENTS
Pt lives in condo with , no steps to negotiate. Previously independent with all functional mobility, Owns RW from mother to use if needed post-op.

## 2022-04-02 NOTE — PHYSICAL THERAPY INITIAL EVALUATION ADULT - HEALTH SCREEN CRITERIA
FEMALE ALLYSON;      GA 30.6 weeks;     Age: 6d;   PMA: _____      Current Status: RDS, increasing feeds, hyperbili of prematurity, immature feeding and thermoregulation; tachycardia    Weight: 1443 +10  Intake(ml/kg/day): 118  Urine output:   2.6 (ml/kg/hr or frequency):                                  Stools (frequency):  x 1  Other:     FEN: Increase feeds EHM 13ml q3h (72). Continue TPN/IL D12.5 @125 ml/kg/day.   Electrolyte abnormalities and metabolic acidosis is being corrected with TPN.   ACCESS:  UV for nutrition.  Ongoing needs assessed daily.    Respiratory: RDS. Requires CPAP 5, 21%. Caffeine for apnea of prematurity; level 15.3 on 5/11  CV: Stable hemodynamics. Continue cardiorespiratory monitoring.  Intermittent tachycardia, EKG pending  Hem: s/p photo for hyperbili of prematurity.  monitor serial bili.  ID: Monitor for signs and symptoms of sepsis.  cultures negative, abx d/c'd.    Neuro: Exam appropriate for GA.  HUS at 1week (5/14).    Social: parents updated at bedside  Labs/Images/Studies:  linda Anguiano      ******************************************************* yes

## 2022-04-02 NOTE — PHYSICAL THERAPY INITIAL EVALUATION ADULT - PRECAUTIONS/LIMITATIONS, REHAB EVAL
Pt presents with left heart cath with Dr Oneal today with myxoma excision with Dr Wade tomorrow. TTE 3/10/22: EF 55-60%, mod echogenic mass, pedunculated with attachment to the posterior wall of right atrium, measuring 2.8cm c/w right atrial myxomaCTA heart 3/16/22: possible obstructive RCA dz, diffuse moderated dz LAD and LCX. Ascending aorta 4.1 cm./cardiac precautions/sternal precautions/surgical precautions

## 2022-04-02 NOTE — PHYSICAL THERAPY INITIAL EVALUATION ADULT - GENERAL OBSERVATIONS, REHAB EVAL
Pt received seated in chair s/p R atrial myoma resection on 4/1/22, c/o headache (h/o migraines), pre-medicated by DEONTE Cruz +a-line, central line, chest tube, garcia catheter, 3L O2 NC, sternal precautions reviewed and maintained.

## 2022-04-03 LAB
ALBUMIN SERPL ELPH-MCNC: 3.7 G/DL — SIGNIFICANT CHANGE UP (ref 3.3–5)
ALP SERPL-CCNC: 77 U/L — SIGNIFICANT CHANGE UP (ref 40–120)
ALT FLD-CCNC: 11 U/L — SIGNIFICANT CHANGE UP (ref 10–45)
ANION GAP SERPL CALC-SCNC: 10 MMOL/L — SIGNIFICANT CHANGE UP (ref 5–17)
AST SERPL-CCNC: 16 U/L — SIGNIFICANT CHANGE UP (ref 10–40)
BASOPHILS # BLD AUTO: 0.01 K/UL — SIGNIFICANT CHANGE UP (ref 0–0.2)
BASOPHILS NFR BLD AUTO: 0.1 % — SIGNIFICANT CHANGE UP (ref 0–2)
BILIRUB SERPL-MCNC: 0.3 MG/DL — SIGNIFICANT CHANGE UP (ref 0.2–1.2)
BUN SERPL-MCNC: 14 MG/DL — SIGNIFICANT CHANGE UP (ref 7–23)
CALCIUM SERPL-MCNC: 8.8 MG/DL — SIGNIFICANT CHANGE UP (ref 8.4–10.5)
CHLORIDE SERPL-SCNC: 100 MMOL/L — SIGNIFICANT CHANGE UP (ref 96–108)
CO2 SERPL-SCNC: 23 MMOL/L — SIGNIFICANT CHANGE UP (ref 22–31)
CREAT SERPL-MCNC: 0.61 MG/DL — SIGNIFICANT CHANGE UP (ref 0.5–1.3)
EGFR: 93 ML/MIN/1.73M2 — SIGNIFICANT CHANGE UP
EOSINOPHIL # BLD AUTO: 0.01 K/UL — SIGNIFICANT CHANGE UP (ref 0–0.5)
EOSINOPHIL NFR BLD AUTO: 0.1 % — SIGNIFICANT CHANGE UP (ref 0–6)
GAS PNL BLDA: SIGNIFICANT CHANGE UP
GLUCOSE BLDC GLUCOMTR-MCNC: 130 MG/DL — HIGH (ref 70–99)
GLUCOSE BLDC GLUCOMTR-MCNC: 133 MG/DL — HIGH (ref 70–99)
GLUCOSE BLDC GLUCOMTR-MCNC: 138 MG/DL — HIGH (ref 70–99)
GLUCOSE BLDC GLUCOMTR-MCNC: 152 MG/DL — HIGH (ref 70–99)
GLUCOSE SERPL-MCNC: 154 MG/DL — HIGH (ref 70–99)
HCT VFR BLD CALC: 28.1 % — LOW (ref 34.5–45)
HGB BLD-MCNC: 9.1 G/DL — LOW (ref 11.5–15.5)
IMM GRANULOCYTES NFR BLD AUTO: 0.5 % — SIGNIFICANT CHANGE UP (ref 0–1.5)
LYMPHOCYTES # BLD AUTO: 0.67 K/UL — LOW (ref 1–3.3)
LYMPHOCYTES # BLD AUTO: 8.3 % — LOW (ref 13–44)
MAGNESIUM SERPL-MCNC: 2.2 MG/DL — SIGNIFICANT CHANGE UP (ref 1.6–2.6)
MCHC RBC-ENTMCNC: 28.5 PG — SIGNIFICANT CHANGE UP (ref 27–34)
MCHC RBC-ENTMCNC: 32.4 GM/DL — SIGNIFICANT CHANGE UP (ref 32–36)
MCV RBC AUTO: 88.1 FL — SIGNIFICANT CHANGE UP (ref 80–100)
MONOCYTES # BLD AUTO: 0.93 K/UL — HIGH (ref 0–0.9)
MONOCYTES NFR BLD AUTO: 11.5 % — SIGNIFICANT CHANGE UP (ref 2–14)
NEUTROPHILS # BLD AUTO: 6.46 K/UL — SIGNIFICANT CHANGE UP (ref 1.8–7.4)
NEUTROPHILS NFR BLD AUTO: 79.5 % — HIGH (ref 43–77)
NRBC # BLD: 0 /100 WBCS — SIGNIFICANT CHANGE UP (ref 0–0)
PHOSPHATE SERPL-MCNC: 2.6 MG/DL — SIGNIFICANT CHANGE UP (ref 2.5–4.5)
PLATELET # BLD AUTO: 111 K/UL — LOW (ref 150–400)
POTASSIUM SERPL-MCNC: 4.3 MMOL/L — SIGNIFICANT CHANGE UP (ref 3.5–5.3)
POTASSIUM SERPL-SCNC: 4.3 MMOL/L — SIGNIFICANT CHANGE UP (ref 3.5–5.3)
PROT SERPL-MCNC: 5.9 G/DL — LOW (ref 6–8.3)
RBC # BLD: 3.19 M/UL — LOW (ref 3.8–5.2)
RBC # FLD: 12.4 % — SIGNIFICANT CHANGE UP (ref 10.3–14.5)
SODIUM SERPL-SCNC: 133 MMOL/L — LOW (ref 135–145)
WBC # BLD: 8.12 K/UL — SIGNIFICANT CHANGE UP (ref 3.8–10.5)
WBC # FLD AUTO: 8.12 K/UL — SIGNIFICANT CHANGE UP (ref 3.8–10.5)

## 2022-04-03 PROCEDURE — 93010 ELECTROCARDIOGRAM REPORT: CPT

## 2022-04-03 PROCEDURE — 99233 SBSQ HOSP IP/OBS HIGH 50: CPT | Mod: 25

## 2022-04-03 PROCEDURE — 71045 X-RAY EXAM CHEST 1 VIEW: CPT | Mod: 26

## 2022-04-03 RX ORDER — SODIUM CHLORIDE 9 MG/ML
3 INJECTION INTRAMUSCULAR; INTRAVENOUS; SUBCUTANEOUS EVERY 8 HOURS
Refills: 0 | Status: DISCONTINUED | OUTPATIENT
Start: 2022-04-03 | End: 2022-04-05

## 2022-04-03 RX ORDER — ATORVASTATIN CALCIUM 80 MG/1
20 TABLET, FILM COATED ORAL AT BEDTIME
Refills: 0 | Status: DISCONTINUED | OUTPATIENT
Start: 2022-04-03 | End: 2022-04-05

## 2022-04-03 RX ORDER — METOPROLOL TARTRATE 50 MG
25 TABLET ORAL ONCE
Refills: 0 | Status: COMPLETED | OUTPATIENT
Start: 2022-04-03 | End: 2022-04-03

## 2022-04-03 RX ORDER — METOPROLOL TARTRATE 50 MG
25 TABLET ORAL EVERY 8 HOURS
Refills: 0 | Status: DISCONTINUED | OUTPATIENT
Start: 2022-04-03 | End: 2022-04-04

## 2022-04-03 RX ORDER — HYDROMORPHONE HYDROCHLORIDE 2 MG/ML
0.5 INJECTION INTRAMUSCULAR; INTRAVENOUS; SUBCUTANEOUS ONCE
Refills: 0 | Status: DISCONTINUED | OUTPATIENT
Start: 2022-04-03 | End: 2022-04-03

## 2022-04-03 RX ORDER — DIAZEPAM 5 MG
5 TABLET ORAL ONCE
Refills: 0 | Status: DISCONTINUED | OUTPATIENT
Start: 2022-04-03 | End: 2022-04-03

## 2022-04-03 RX ORDER — ONDANSETRON 8 MG/1
4 TABLET, FILM COATED ORAL ONCE
Refills: 0 | Status: COMPLETED | OUTPATIENT
Start: 2022-04-03 | End: 2022-04-03

## 2022-04-03 RX ORDER — DIAZEPAM 5 MG
5 TABLET ORAL AT BEDTIME
Refills: 0 | Status: DISCONTINUED | OUTPATIENT
Start: 2022-04-03 | End: 2022-04-05

## 2022-04-03 RX ORDER — PANTOPRAZOLE SODIUM 20 MG/1
40 TABLET, DELAYED RELEASE ORAL EVERY 12 HOURS
Refills: 0 | Status: DISCONTINUED | OUTPATIENT
Start: 2022-04-03 | End: 2022-04-03

## 2022-04-03 RX ORDER — PANTOPRAZOLE SODIUM 20 MG/1
40 TABLET, DELAYED RELEASE ORAL
Refills: 0 | Status: DISCONTINUED | OUTPATIENT
Start: 2022-04-04 | End: 2022-04-05

## 2022-04-03 RX ADMIN — SODIUM CHLORIDE 3 MILLILITER(S): 9 INJECTION INTRAMUSCULAR; INTRAVENOUS; SUBCUTANEOUS at 13:12

## 2022-04-03 RX ADMIN — Medication 25 MILLIGRAM(S): at 15:53

## 2022-04-03 RX ADMIN — Medication 5 MILLIGRAM(S): at 21:13

## 2022-04-03 RX ADMIN — Medication 10 MILLIGRAM(S): at 13:07

## 2022-04-03 RX ADMIN — SODIUM CHLORIDE 3 MILLILITER(S): 9 INJECTION INTRAMUSCULAR; INTRAVENOUS; SUBCUTANEOUS at 21:19

## 2022-04-03 RX ADMIN — MUPIROCIN 1 APPLICATION(S): 20 OINTMENT TOPICAL at 05:18

## 2022-04-03 RX ADMIN — PANTOPRAZOLE SODIUM 40 MILLIGRAM(S): 20 TABLET, DELAYED RELEASE ORAL at 01:39

## 2022-04-03 RX ADMIN — SENNA PLUS 2 TABLET(S): 8.6 TABLET ORAL at 21:15

## 2022-04-03 RX ADMIN — Medication 10 MILLIGRAM(S): at 21:10

## 2022-04-03 RX ADMIN — Medication 5 MILLIGRAM(S): at 00:36

## 2022-04-03 RX ADMIN — Medication 25 MILLIGRAM(S): at 21:14

## 2022-04-03 RX ADMIN — ENOXAPARIN SODIUM 40 MILLIGRAM(S): 100 INJECTION SUBCUTANEOUS at 11:13

## 2022-04-03 RX ADMIN — Medication 10 MILLIGRAM(S): at 05:18

## 2022-04-03 RX ADMIN — HYDROMORPHONE HYDROCHLORIDE 0.5 MILLIGRAM(S): 2 INJECTION INTRAMUSCULAR; INTRAVENOUS; SUBCUTANEOUS at 05:49

## 2022-04-03 RX ADMIN — MUPIROCIN 1 APPLICATION(S): 20 OINTMENT TOPICAL at 18:14

## 2022-04-03 RX ADMIN — ONDANSETRON 4 MILLIGRAM(S): 8 TABLET, FILM COATED ORAL at 09:16

## 2022-04-03 RX ADMIN — HYDROMORPHONE HYDROCHLORIDE 0.5 MILLIGRAM(S): 2 INJECTION INTRAMUSCULAR; INTRAVENOUS; SUBCUTANEOUS at 04:34

## 2022-04-03 RX ADMIN — BENZOCAINE AND MENTHOL 1 LOZENGE: 5; 1 LIQUID ORAL at 05:25

## 2022-04-03 RX ADMIN — Medication 2: at 07:25

## 2022-04-03 RX ADMIN — ATORVASTATIN CALCIUM 20 MILLIGRAM(S): 80 TABLET, FILM COATED ORAL at 21:13

## 2022-04-03 RX ADMIN — Medication 25 MILLIGRAM(S): at 05:18

## 2022-04-03 NOTE — PROGRESS NOTE ADULT - ASSESSMENT
This is a 78 y/o female with PMHx of HTN, HLD, GERD, CAD, prediabetes, migraine (on Fiorcet and Valium), hair loss (on minoxidil) recently diagnosed right atrial myxoma diagnosed by Dr Kiko Hurtado. Pt with strong family hx of heart dz: grandmother  suddenly at age 65, brother  at 76 from MI, younger brother  from MI age at 59, 3rd brother with CABG.  Pt presents with left heart cath with Dr Oneal today prior to scheduled myxoma excision with Dr Wade 22. LHC shows non-obs CAD.     On 22, pt underwent excision of RA myxoma, beating heart via r atriotomy  post-op course unremarkable   hx migraines - valium resumed hs & Bultabilital resumed  4/3 tr. fl- SR 95 - lopressor increased to 25mg po q8h.    d/c plan d/c home ?monday/tuesday

## 2022-04-03 NOTE — PROGRESS NOTE ADULT - SUBJECTIVE AND OBJECTIVE BOX
AMADOU POSADAS  MRN-[id number]  Patient is a 77y old  Female who presents with a chief complaint of right atrial mass resection (2022 07:06)    HPI:  77 year old  female no implantable devices with h/o HTN, HLD, GERD, YIN, prediabetes, migraine (on Fiorcet and Valium), hair loss (on minoxidil) recently diagnosed right atrial myxoma diagnosed by Dr Kiko Hurtado. Pt with strong family hx of heart dz: grandmother  suddenly at age 65, brother  at 76 from MI, younger brother  from MI age at 59, 3rd brother with CABG.  Pt denies MI/CVA/lung/kidney/liver disease. Pt presents with left heart cath with Dr Oneal today with myxoma excision with Dr Wade tomorrow.     TTE 3/10/22: EF 55-60%, mod echogenic mass, pedunculated with attachment to the posterior wall of right atrium, measuring 2.8cm c/w right atrial myxoma    CTA heart 3/16/22: possible obstructive RCA dz, diffuse moderated dz LAD and LCX. Ascending aorta 4.1 cm.      (31 Mar 2022 08:50)      Surgery/Hospital course:   right atrial mass resection    REVIEW OF SYSTEMS:  Gen: Slightly elevated temp  EYES/ENT: No visual changes;  No vertigo or throat pain   NECK: No pain   RES:  No shortness of breath or Cough  CARD: No chest pain   GI: No abdominal pain  : No dysuria  NEURO: No weakness  SKIN: No itching, rashes     Vital Signs Last 24 Hrs  T(C): 37.7 (2022 08:00), Max: 38 (2022 12:00)  T(F): 99.9 (2022 08:00), Max: 100.4 (2022 12:00)  HR: 83 (2022 08:00) (73 - 97)  BP: --  BP(mean): --  RR: 16 (2022 08:00) (13 - 27)  SpO2: 99% (2022 08:00) (94% - 100%)    ============================I/O===========================   I&O's Detail    2022 07:01  -  2022 07:00  --------------------------------------------------------  IN:    Albumin 5%  - 250 mL: 250 mL    Insulin: 14 mL    IV PiggyBack: 350 mL    Oral Fluid: 750 mL    sodium chloride 0.9%: 120 mL  Total IN: 1484 mL    OUT:    Chest Tube (mL): 40 mL    Chest Tube (mL): 160 mL    Ureteral Catheter (mL): 3400 mL  Total OUT: 3600 mL    Total NET: -2116 mL      2022 07:01  -  2022 08:05  --------------------------------------------------------  IN:    Oral Fluid: 250 mL  Total IN: 250 mL    OUT:    Chest Tube (mL): 0 mL    Chest Tube (mL): 0 mL    Ureteral Catheter (mL): 45 mL  Total OUT: 45 mL    Total NET: 205 mL        ============================ LABS =========================                        9.1    8.12  )-----------( 111      ( 2022 00:29 )             28.1     04-03    133<L>  |  100  |  14  ----------------------------<  154<H>  4.3   |  23  |  0.61    Ca    8.8      2022 00:28  Phos  2.6     04-03  Mg     2.2     04-03    TPro  5.9<L>  /  Alb  3.7  /  TBili  0.3  /  DBili  x   /  AST  16  /  ALT  11  /  AlkPhos  77  04-03    LIVER FUNCTIONS - ( 2022 00:28 )  Alb: 3.7 g/dL / Pro: 5.9 g/dL / ALK PHOS: 77 U/L / ALT: 11 U/L / AST: 16 U/L / GGT: x           PT/INR - ( 2022 12:19 )   PT: 14.2 sec;   INR: 1.22 ratio         PTT - ( 2022 12:19 )  PTT:30.8 sec  ABG - ( 2022 00:15 )  pH, Arterial: 7.41  pH, Blood: x     /  pCO2: 41    /  pO2: 105   / HCO3: 26    / Base Excess: 1.2   /  SaO2: 98.0                ======================Microbiology/Radiology=================  CXR: Reviewed  ======================================================  PAST MEDICAL & SURGICAL HISTORY:  CAD (coronary artery disease)    Atrial myxoma    Cluster headache    Vitamin B12 deficiency    Disc degeneration, lumbar    H/O diverticulitis of colon    HTN (hypertension)    Folate deficiency    GERD (gastroesophageal reflux disease)    Generalized anxiety disorder    Osteoarthritis, chronic    Hair loss  on minoxidil    Hiatal hernia    History of prediabetes    Insomnia    IBS (irritable bowel syndrome)    Migraine headache    Osteoporosis    HLD (hyperlipidemia)    Statin-induced myositis    Vitamin D deficiency    Rotator cuff tear arthropathy    History of duodenal ulcer    H/O breast biopsy    History of total hip replacement, left    S/P tubal ligation      ====================ASSESSMENT ==============  Atrial myxoma S/P resection of atrial myxoma on 22  Hypovolemic Shock   Post op respiratory insufficiency   Stress Hyperglycemia / Prediabetes   Acute blood loss anemia   Thrombocytopenia       Plan:  ====================== NEUROLOGY=====================  Continue to monitor neuro status per ICU protocol.  Continue diazepam for anxiety  Awaiting SDU    diazepam    Tablet 5 milliGRAM(s) Oral at bedtime    ==================== RESPIRATORY======================  Receiving supplemental O2 therapy with NC.  Continue to monitor RR, breathing pattern, pulse ox, and ABG's.  Encourage incentive spirometry.     ====================CARDIOVASCULAR==================  Atrial myxoma S/P resection of atrial myxoma on 22  Invasive hemodynamic monitoring, assess perfusion indices.   Continue with Lopressor for rate control.  Continue with ASA for recent procedure    aspirin enteric coated 81 milliGRAM(s) Oral daily  metoprolol tartrate 25 milliGRAM(s) Oral every 12 hours    ===================HEMATOLOGIC/ONC ===================  Anemia and thromboctypenia due to acute blood loss.   Monitor H&H/Plts   Continue Lovenox for venous thromboembolism prophylaxis.     enoxaparin Injectable 40 milliGRAM(s) SubCutaneous every 24 hours    ===================== RENAL =========================  Continue monitoring urine output, I&Os, Bun/Cr    ==================== GASTROINTESTINAL===================  Tolerating PO consistent carb diet.   Continue Protonix for stress ulcer prophylaxis.   Bowel regimen with Miralax and senna.  Reglan for gut motility     aluminum hydroxide/magnesium hydroxide/simethicone Suspension 30 milliLiter(s) Oral every 6 hours PRN Dyspepsia  bisacodyl Suppository 10 milliGRAM(s) Rectal once  metoclopramide Injectable 10 milliGRAM(s) IV Push every 8 hours  pantoprazole  Injectable 40 milliGRAM(s) IV Push every 12 hours  polyethylene glycol 3350 17 Gram(s) Oral daily  senna 2 Tablet(s) Oral at bedtime    =======================    ENDOCRINE  =====================  Stress Hyperglycemia and prediabetic, glycemic control with Admelog sliding scale and Glucagon PRN.   Monitor blood glucose levels.     insulin lispro (ADMELOG) corrective regimen sliding scale   SubCutaneous three times a day before meals  insulin lispro (ADMELOG) corrective regimen sliding scale   SubCutaneous at bedtime    ========================INFECTIOUS DISEASE================  Temp: 99.9F, white blood cells within normal limits.  Monitor temperature and trend white blood cells. Monitor off antibiotics.       Patient requires continuous monitoring with bedside rhythm monitoring, pulse ox monitoring, and intermittent blood gas analysis. Care plan discussed with ICU care team. Patient remained critical and at risk for life threatening decompensation.    By signing my name below, I, Yee Sagastume, attest that this documentation has been prepared under the direction and in the presence of Shemar Rosenberg MD   Electronically signed: Greg Wuibe    I, Shemar Rosenberg, personally performed the services described in this documentation. all medical record entries made by the scribe were at my direction and in my presence. I have reviewed the chart and agree that the record reflects my personal performance and is accurate and complete  Electronically signed: Shemar Rosenberg MD 22 @ 08:05       AMADOU POSADAS  MRN-[id number]  Patient is a 77y old  Female who presents with a chief complaint of right atrial mass resection (2022 07:06)    HPI:  77 year old  female no implantable devices with h/o HTN, HLD, GERD, YIN, prediabetes, migraine (on Fiorcet and Valium), hair loss (on minoxidil) recently diagnosed right atrial myxoma diagnosed by Dr Kiko Hurtado. Pt with strong family hx of heart dz: grandmother  suddenly at age 65, brother  at 76 from MI, younger brother  from MI age at 59, 3rd brother with CABG.  Pt denies MI/CVA/lung/kidney/liver disease. Pt presents with left heart cath with Dr Oneal today with myxoma excision with Dr Wade tomorrow.     TTE 3/10/22: EF 55-60%, mod echogenic mass, pedunculated with attachment to the posterior wall of right atrium, measuring 2.8cm c/w right atrial myxoma    CTA heart 3/16/22: possible obstructive RCA dz, diffuse moderated dz LAD and LCX. Ascending aorta 4.1 cm.      (31 Mar 2022 08:50)      Surgery/Hospital course:   right atrial mass resection    REVIEW OF SYSTEMS:  Gen: Slightly elevated temp  EYES/ENT: No visual changes;  No vertigo or throat pain   NECK: No pain   RES:  No shortness of breath or Cough  CARD: No chest pain   GI: No abdominal pain  : No dysuria  NEURO: No weakness  SKIN: No itching, rashes     Vital Signs Last 24 Hrs  T(C): 37.7 (2022 08:00), Max: 38 (2022 12:00)  T(F): 99.9 (2022 08:00), Max: 100.4 (2022 12:00)  HR: 83 (2022 08:00) (73 - 97)  BP: --  BP(mean): --  RR: 16 (2022 08:00) (13 - 27)  SpO2: 99% (2022 08:00) (94% - 100%)    ============================I/O===========================   I&O's Detail    2022 07:01  -  2022 07:00  --------------------------------------------------------  IN:    Albumin 5%  - 250 mL: 250 mL    Insulin: 14 mL    IV PiggyBack: 350 mL    Oral Fluid: 750 mL    sodium chloride 0.9%: 120 mL  Total IN: 1484 mL    OUT:    Chest Tube (mL): 40 mL    Chest Tube (mL): 160 mL    Ureteral Catheter (mL): 3400 mL  Total OUT: 3600 mL    Total NET: -2116 mL      2022 07:01  -  2022 08:05  --------------------------------------------------------  IN:    Oral Fluid: 250 mL  Total IN: 250 mL    OUT:    Chest Tube (mL): 0 mL    Chest Tube (mL): 0 mL    Ureteral Catheter (mL): 45 mL  Total OUT: 45 mL    Total NET: 205 mL        ============================ LABS =========================                        9.1    8.12  )-----------( 111      ( 2022 00:29 )             28.1     04-03    133<L>  |  100  |  14  ----------------------------<  154<H>  4.3   |  23  |  0.61    Ca    8.8      2022 00:28  Phos  2.6     04-03  Mg     2.2     04-03    TPro  5.9<L>  /  Alb  3.7  /  TBili  0.3  /  DBili  x   /  AST  16  /  ALT  11  /  AlkPhos  77  04-03    LIVER FUNCTIONS - ( 2022 00:28 )  Alb: 3.7 g/dL / Pro: 5.9 g/dL / ALK PHOS: 77 U/L / ALT: 11 U/L / AST: 16 U/L / GGT: x           PT/INR - ( 2022 12:19 )   PT: 14.2 sec;   INR: 1.22 ratio         PTT - ( 2022 12:19 )  PTT:30.8 sec  ABG - ( 2022 00:15 )  pH, Arterial: 7.41  pH, Blood: x     /  pCO2: 41    /  pO2: 105   / HCO3: 26    / Base Excess: 1.2   /  SaO2: 98.0                ======================Microbiology/Radiology=================  CXR: Reviewed  ======================================================  PAST MEDICAL & SURGICAL HISTORY:  CAD (coronary artery disease)    Atrial myxoma    Cluster headache    Vitamin B12 deficiency    Disc degeneration, lumbar    H/O diverticulitis of colon    HTN (hypertension)    Folate deficiency    GERD (gastroesophageal reflux disease)    Generalized anxiety disorder    Osteoarthritis, chronic    Hair loss  on minoxidil    Hiatal hernia    History of prediabetes    Insomnia    IBS (irritable bowel syndrome)    Migraine headache    Osteoporosis    HLD (hyperlipidemia)    Statin-induced myositis    Vitamin D deficiency    Rotator cuff tear arthropathy    History of duodenal ulcer    H/O breast biopsy    History of total hip replacement, left    S/P tubal ligation      ====================ASSESSMENT ==============  Atrial myxoma S/P resection of atrial myxoma on 22  Hypovolemic Shock   Post op respiratory insufficiency   Stress Hyperglycemia / Prediabetes   Acute blood loss anemia   Thrombocytopenia       Plan:  ====================== NEUROLOGY=====================  Continue to monitor neuro status per ICU protocol.  Continue diazepam for anxiety  Awaiting SDU    diazepam    Tablet 5 milliGRAM(s) Oral at bedtime    ==================== RESPIRATORY======================  Receiving supplemental O2 therapy with NC.  Continue to monitor RR, breathing pattern, pulse ox, and ABG's.  Encourage incentive spirometry.     ====================CARDIOVASCULAR==================  Atrial myxoma S/P resection of atrial myxoma on 22  Invasive hemodynamic monitoring, assess perfusion indices.   Continue with Lopressor for rate control.  Continue with ASA for recent procedure    aspirin enteric coated 81 milliGRAM(s) Oral daily  metoprolol tartrate 25 milliGRAM(s) Oral every 12 hours    ===================HEMATOLOGIC/ONC ===================  Anemia and thromboctypenia due to acute blood loss.   Monitor H&H/Plts   Continue Lovenox for venous thromboembolism prophylaxis.     enoxaparin Injectable 40 milliGRAM(s) SubCutaneous every 24 hours    ===================== RENAL =========================  Continue monitoring urine output, I&Os, Bun/Cr    ==================== GASTROINTESTINAL===================  Tolerating PO consistent carb diet.   Continue Protonix for stress ulcer prophylaxis.   Bowel regimen with Miralax and senna.  Reglan for gut motility     aluminum hydroxide/magnesium hydroxide/simethicone Suspension 30 milliLiter(s) Oral every 6 hours PRN Dyspepsia  bisacodyl Suppository 10 milliGRAM(s) Rectal once  metoclopramide Injectable 10 milliGRAM(s) IV Push every 8 hours  pantoprazole  Injectable 40 milliGRAM(s) IV Push every 12 hours  polyethylene glycol 3350 17 Gram(s) Oral daily  senna 2 Tablet(s) Oral at bedtime    =======================    ENDOCRINE  =====================  Stress Hyperglycemia and prediabetic, glycemic control with Admelog sliding scale and Glucagon PRN.   Monitor blood glucose levels.     insulin lispro (ADMELOG) corrective regimen sliding scale   SubCutaneous three times a day before meals  insulin lispro (ADMELOG) corrective regimen sliding scale   SubCutaneous at bedtime    ========================INFECTIOUS DISEASE================  Temp: 99.9F, white blood cells within normal limits.  Monitor temperature and trend white blood cells. Monitor off antibiotics.       Patient requires continuous monitoring with bedside rhythm monitoring, pulse ox monitoring, and intermittent blood gas analysis. Care plan discussed with ICU care team.     By signing my name below, I, Yee Sagastume, attest that this documentation has been prepared under the direction and in the presence of Shemar Rosenberg MD   Electronically signed: Greg Wuibe    I, Shemar Rosenberg, personally performed the services described in this documentation. all medical record entries made by the scribe were at my direction and in my presence. I have reviewed the chart and agree that the record reflects my personal performance and is accurate and complete  Electronically signed: Shemar Rosenberg MD 22 @ 08:05

## 2022-04-03 NOTE — PROGRESS NOTE ADULT - SUBJECTIVE AND OBJECTIVE BOX
VITAL SIGNS-Telemetry:  SR 95  Vital Signs Last 24 Hrs  T(C): 37.4 (22 @ 12:04), Max: 37.8 (22 @ 16:00)  T(F): 99.3 (22 @ 12:04), Max: 100 (22 @ 16:00)  HR: 94 (22 @ 12:04) (73 - 94)  BP: 144/89 (22 @ 12:04) (142/68 - 144/89)  RR: 18 (22 @ 12:04) (13 - 26)  SpO2: 98% (22 @ 12:04) (94% - 100%)          @ 07:01  -   @ 07:00  --------------------------------------------------------  IN: 1484 mL / OUT: 3600 mL / NET: -2116 mL     @ 07:01  -   @ 15:32  --------------------------------------------------------  IN: 400 mL / OUT: 970 mL / NET: -570 mL    Daily     Daily Weight in k.5 (2022 00:00)    CAPILLARY BLOOD GLUCOSE  POCT Blood Glucose.: 133 mg/dL (2022 11:38)  POCT Blood Glucose.: 152 mg/dL (2022 06:37)  POCT Blood Glucose.: 166 mg/dL (2022 21:31)  POCT Blood Glucose.: 186 mg/dL (2022 17:28)             PHYSICAL EXAM:  Neurology: alert and oriented x 3, nonfocal, no gross deficits  CV : S1S2  Sternal Wound :  CDI , Stable  Lungs: cta  Abdomen: soft, nontender, nondistended, positive bowel sounds, last bowel movement   preop      Extremities:    no c/c/e    aluminum hydroxide/magnesium hydroxide/simethicone Suspension 30 milliLiter(s) Oral every 6 hours PRN  aspirin enteric coated 81 milliGRAM(s) Oral daily  atorvastatin 20 milliGRAM(s) Oral at bedtime  benzocaine 15 mG/menthol 3.6 mG Lozenge 1 Lozenge Oral every 6 hours PRN  bisacodyl Suppository 10 milliGRAM(s) Rectal once  butalbital 50mg/acetaminophen 325mg 1 Tablet(s) 1 Tablet(s) Oral every 4 hours PRN  diazepam    Tablet 5 milliGRAM(s) Oral at bedtime  enoxaparin Injectable 40 milliGRAM(s) SubCutaneous every 24 hours  insulin lispro (ADMELOG) corrective regimen sliding scale   SubCutaneous three times a day before meals  insulin lispro (ADMELOG) corrective regimen sliding scale   SubCutaneous at bedtime  metoclopramide Injectable 10 milliGRAM(s) IV Push every 8 hours  metoprolol tartrate 25 milliGRAM(s) Oral every 8 hours  mupirocin 2% Ointment 1 Application(s) Topical two times a day  polyethylene glycol 3350 17 Gram(s) Oral daily  senna 2 Tablet(s) Oral at bedtime  sodium chloride 0.9% lock flush 3 milliLiter(s) IV Push every 8 hours    Physical Therapy Rec:   Home  [ x ]   Home w/ PT  [  ]  Rehab  [  ]  Discussed with Cardiothoracic Team at AM rounds.

## 2022-04-04 ENCOUNTER — TRANSCRIPTION ENCOUNTER (OUTPATIENT)
Age: 77
End: 2022-04-04

## 2022-04-04 LAB
ANION GAP SERPL CALC-SCNC: 9 MMOL/L — SIGNIFICANT CHANGE UP (ref 5–17)
BASOPHILS # BLD AUTO: 0.02 K/UL — SIGNIFICANT CHANGE UP (ref 0–0.2)
BASOPHILS NFR BLD AUTO: 0.3 % — SIGNIFICANT CHANGE UP (ref 0–2)
BUN SERPL-MCNC: 12 MG/DL — SIGNIFICANT CHANGE UP (ref 7–23)
CALCIUM SERPL-MCNC: 9.1 MG/DL — SIGNIFICANT CHANGE UP (ref 8.4–10.5)
CHLORIDE SERPL-SCNC: 103 MMOL/L — SIGNIFICANT CHANGE UP (ref 96–108)
CO2 SERPL-SCNC: 27 MMOL/L — SIGNIFICANT CHANGE UP (ref 22–31)
CREAT SERPL-MCNC: 0.62 MG/DL — SIGNIFICANT CHANGE UP (ref 0.5–1.3)
EGFR: 93 ML/MIN/1.73M2 — SIGNIFICANT CHANGE UP
EOSINOPHIL # BLD AUTO: 0.03 K/UL — SIGNIFICANT CHANGE UP (ref 0–0.5)
EOSINOPHIL NFR BLD AUTO: 0.4 % — SIGNIFICANT CHANGE UP (ref 0–6)
GLUCOSE BLDC GLUCOMTR-MCNC: 102 MG/DL — HIGH (ref 70–99)
GLUCOSE BLDC GLUCOMTR-MCNC: 102 MG/DL — HIGH (ref 70–99)
GLUCOSE BLDC GLUCOMTR-MCNC: 113 MG/DL — HIGH (ref 70–99)
GLUCOSE BLDC GLUCOMTR-MCNC: 119 MG/DL — HIGH (ref 70–99)
GLUCOSE SERPL-MCNC: 128 MG/DL — HIGH (ref 70–99)
HCT VFR BLD CALC: 30.3 % — LOW (ref 34.5–45)
HGB BLD-MCNC: 9.6 G/DL — LOW (ref 11.5–15.5)
IMM GRANULOCYTES NFR BLD AUTO: 0.6 % — SIGNIFICANT CHANGE UP (ref 0–1.5)
LYMPHOCYTES # BLD AUTO: 0.81 K/UL — LOW (ref 1–3.3)
LYMPHOCYTES # BLD AUTO: 10.2 % — LOW (ref 13–44)
MCHC RBC-ENTMCNC: 28.8 PG — SIGNIFICANT CHANGE UP (ref 27–34)
MCHC RBC-ENTMCNC: 31.7 GM/DL — LOW (ref 32–36)
MCV RBC AUTO: 91 FL — SIGNIFICANT CHANGE UP (ref 80–100)
MONOCYTES # BLD AUTO: 0.95 K/UL — HIGH (ref 0–0.9)
MONOCYTES NFR BLD AUTO: 12 % — SIGNIFICANT CHANGE UP (ref 2–14)
NEUTROPHILS # BLD AUTO: 6.08 K/UL — SIGNIFICANT CHANGE UP (ref 1.8–7.4)
NEUTROPHILS NFR BLD AUTO: 76.5 % — SIGNIFICANT CHANGE UP (ref 43–77)
NRBC # BLD: 0 /100 WBCS — SIGNIFICANT CHANGE UP (ref 0–0)
PLATELET # BLD AUTO: 126 K/UL — LOW (ref 150–400)
POTASSIUM SERPL-MCNC: 4.3 MMOL/L — SIGNIFICANT CHANGE UP (ref 3.5–5.3)
POTASSIUM SERPL-SCNC: 4.3 MMOL/L — SIGNIFICANT CHANGE UP (ref 3.5–5.3)
RBC # BLD: 3.33 M/UL — LOW (ref 3.8–5.2)
RBC # FLD: 12.6 % — SIGNIFICANT CHANGE UP (ref 10.3–14.5)
SODIUM SERPL-SCNC: 139 MMOL/L — SIGNIFICANT CHANGE UP (ref 135–145)
WBC # BLD: 7.94 K/UL — SIGNIFICANT CHANGE UP (ref 3.8–10.5)
WBC # FLD AUTO: 7.94 K/UL — SIGNIFICANT CHANGE UP (ref 3.8–10.5)

## 2022-04-04 PROCEDURE — 71045 X-RAY EXAM CHEST 1 VIEW: CPT | Mod: 26

## 2022-04-04 RX ORDER — ACETAMINOPHEN 500 MG
650 TABLET ORAL EVERY 6 HOURS
Refills: 0 | Status: DISCONTINUED | OUTPATIENT
Start: 2022-04-04 | End: 2022-04-05

## 2022-04-04 RX ORDER — METOPROLOL TARTRATE 50 MG
50 TABLET ORAL DAILY
Refills: 0 | Status: DISCONTINUED | OUTPATIENT
Start: 2022-04-04 | End: 2022-04-05

## 2022-04-04 RX ADMIN — SENNA PLUS 2 TABLET(S): 8.6 TABLET ORAL at 21:43

## 2022-04-04 RX ADMIN — ATORVASTATIN CALCIUM 20 MILLIGRAM(S): 80 TABLET, FILM COATED ORAL at 17:11

## 2022-04-04 RX ADMIN — MUPIROCIN 1 APPLICATION(S): 20 OINTMENT TOPICAL at 17:11

## 2022-04-04 RX ADMIN — Medication 5 MILLIGRAM(S): at 21:43

## 2022-04-04 RX ADMIN — Medication 25 MILLIGRAM(S): at 05:39

## 2022-04-04 RX ADMIN — SODIUM CHLORIDE 3 MILLILITER(S): 9 INJECTION INTRAMUSCULAR; INTRAVENOUS; SUBCUTANEOUS at 13:10

## 2022-04-04 RX ADMIN — MUPIROCIN 1 APPLICATION(S): 20 OINTMENT TOPICAL at 05:45

## 2022-04-04 RX ADMIN — SODIUM CHLORIDE 3 MILLILITER(S): 9 INJECTION INTRAMUSCULAR; INTRAVENOUS; SUBCUTANEOUS at 05:33

## 2022-04-04 RX ADMIN — ENOXAPARIN SODIUM 40 MILLIGRAM(S): 100 INJECTION SUBCUTANEOUS at 12:47

## 2022-04-04 RX ADMIN — PANTOPRAZOLE SODIUM 40 MILLIGRAM(S): 20 TABLET, DELAYED RELEASE ORAL at 05:39

## 2022-04-04 RX ADMIN — SODIUM CHLORIDE 3 MILLILITER(S): 9 INJECTION INTRAMUSCULAR; INTRAVENOUS; SUBCUTANEOUS at 22:09

## 2022-04-04 RX ADMIN — Medication 50 MILLIGRAM(S): at 17:10

## 2022-04-04 RX ADMIN — Medication 10 MILLIGRAM(S): at 05:40

## 2022-04-04 NOTE — OCCUPATIONAL THERAPY INITIAL EVALUATION ADULT - PERTINENT HX OF CURRENT PROBLEM, REHAB EVAL
76 yo F  recently diagnosed right atrial myxoma. Pt denies MI/CVA/lung/kidney/liver disease. Pt presents with left heart cath with Dr Avitia r3/31 with myxoma excision  tomorrow. TTE 3/10/22: EF 55-60%, mod echogenic mass, pedunculated with attachment to the posterior wall of right atrium, measuring 2.8cm c/w right atrial myxomaCTA heart 3/16/22: possible obstructive RCA dz, diffuse moderated dz LAD and LCX. Ascending aorta 4.1 cm.

## 2022-04-04 NOTE — PROGRESS NOTE ADULT - PROBLEM SELECTOR PLAN 1
Right atrial myxoma excision 4/1 with Dr. Wade  asa/bb  d/c plan ;home ?monday/tuesday s/p 4/1 right atrial myxoma excision  continue postop care  continue asa and statin  d/c lopressor and change to toprol 50 qd  pulm toilet  pain management  increase activity as tolerated  Discharge planning- home tue if stable

## 2022-04-04 NOTE — PROGRESS NOTE ADULT - PROBLEM SELECTOR PLAN 3
C/W GI prophylaxis on protonix 40mg daily

## 2022-04-04 NOTE — PROGRESS NOTE ADULT - SUBJECTIVE AND OBJECTIVE BOX
VITAL SIGNS    Telemetry:    Vital Signs Last 24 Hrs  T(C): 37.2 (04-04-22 @ 05:00), Max: 37.4 (04-03-22 @ 12:04)  T(F): 99 (04-04-22 @ 05:00), Max: 99.3 (04-03-22 @ 12:04)  HR: 101 (04-04-22 @ 09:47) (74 - 101)  BP: 122/76 (04-04-22 @ 09:47) (122/76 - 158/84)  RR: 18 (04-04-22 @ 05:00) (16 - 18)  SpO2: 92% (04-04-22 @ 09:47) (92% - 98%)            04-03 @ 07:01  -  04-04 @ 07:00  --------------------------------------------------------  IN: 930 mL / OUT: 2695 mL / NET: -1765 mL    04-04 @ 07:01  -  04-04 @ 10:33  --------------------------------------------------------  IN: 200 mL / OUT: 100 mL / NET: 100 mL       Daily     Daily   Admit Wt: Drug Dosing Weight  Height (cm): 167.6 (01 Apr 2022 07:16)  Weight (kg): 88.9 (01 Apr 2022 07:16)  BMI (kg/m2): 31.6 (01 Apr 2022 07:16)  BSA (m2): 1.98 (01 Apr 2022 07:16)      CAPILLARY BLOOD GLUCOSE      POCT Blood Glucose.: 113 mg/dL (04 Apr 2022 07:38)  POCT Blood Glucose.: 138 mg/dL (03 Apr 2022 21:13)  POCT Blood Glucose.: 130 mg/dL (03 Apr 2022 16:30)  POCT Blood Glucose.: 133 mg/dL (03 Apr 2022 11:38)          aluminum hydroxide/magnesium hydroxide/simethicone Suspension 30 milliLiter(s) Oral every 6 hours PRN  aspirin enteric coated 81 milliGRAM(s) Oral daily  atorvastatin 20 milliGRAM(s) Oral at bedtime  benzocaine 15 mG/menthol 3.6 mG Lozenge 1 Lozenge Oral every 6 hours PRN  bisacodyl Suppository 10 milliGRAM(s) Rectal once  butalbital 50mg/acetaminophen 325mg 1 Tablet(s) 1 Tablet(s) Oral every 4 hours PRN  diazepam    Tablet 5 milliGRAM(s) Oral at bedtime  enoxaparin Injectable 40 milliGRAM(s) SubCutaneous every 24 hours  insulin lispro (ADMELOG) corrective regimen sliding scale   SubCutaneous three times a day before meals  insulin lispro (ADMELOG) corrective regimen sliding scale   SubCutaneous at bedtime  metoclopramide Injectable 10 milliGRAM(s) IV Push every 8 hours  metoprolol succinate ER 50 milliGRAM(s) Oral daily  mupirocin 2% Ointment 1 Application(s) Topical two times a day  pantoprazole    Tablet 40 milliGRAM(s) Oral before breakfast  polyethylene glycol 3350 17 Gram(s) Oral daily  senna 2 Tablet(s) Oral at bedtime  sodium chloride 0.9% lock flush 3 milliLiter(s) IV Push every 8 hours      PHYSICAL EXAM    Subjective: "Hi.   Neurology: alert and oriented x 3, nonfocal, no gross deficits  CV : tele:  RSR  Sternal Wound :  CDI with dressing , Stable  Lungs: clear. RR easy, unlabored   Abdomen: soft, nontender, nondistended, positive bowel sounds, bowel movement   Neg N/V/D   :  pt voiding without difficulty   Extremities:   TORRES; edema, neg calf tenderness.   PPP bilaterally      PW:  Chest tubes:                 VITAL SIGNS    Telemetry:  rsr   Vital Signs Last 24 Hrs  T(C): 37.2 (04-04-22 @ 05:00), Max: 37.4 (04-03-22 @ 12:04)  T(F): 99 (04-04-22 @ 05:00), Max: 99.3 (04-03-22 @ 12:04)  HR: 101 (04-04-22 @ 09:47) (74 - 101)  BP: 122/76 (04-04-22 @ 09:47) (122/76 - 158/84)  RR: 18 (04-04-22 @ 05:00) (16 - 18)  SpO2: 92% (04-04-22 @ 09:47) (92% - 98%)            04-03 @ 07:01  -  04-04 @ 07:00  --------------------------------------------------------  IN: 930 mL / OUT: 2695 mL / NET: -1765 mL    04-04 @ 07:01  -  04-04 @ 10:33  --------------------------------------------------------  IN: 200 mL / OUT: 100 mL / NET: 100 mL         Daily   Admit Wt: Drug Dosing Weight  Height (cm): 167.6 (01 Apr 2022 07:16)  Weight (kg): 88.9 (01 Apr 2022 07:16)  BMI (kg/m2): 31.6 (01 Apr 2022 07:16)  BSA (m2): 1.98 (01 Apr 2022 07:16)      CAPILLARY BLOOD GLUCOSE      POCT Blood Glucose.: 113 mg/dL (04 Apr 2022 07:38)  POCT Blood Glucose.: 138 mg/dL (03 Apr 2022 21:13)  POCT Blood Glucose.: 130 mg/dL (03 Apr 2022 16:30)  POCT Blood Glucose.: 133 mg/dL (03 Apr 2022 11:38)          aluminum hydroxide/magnesium hydroxide/simethicone Suspension 30 milliLiter(s) Oral every 6 hours PRN  aspirin enteric coated 81 milliGRAM(s) Oral daily  atorvastatin 20 milliGRAM(s) Oral at bedtime  benzocaine 15 mG/menthol 3.6 mG Lozenge 1 Lozenge Oral every 6 hours PRN  bisacodyl Suppository 10 milliGRAM(s) Rectal once  butalbital 50mg/acetaminophen 325mg 1 Tablet(s) 1 Tablet(s) Oral every 4 hours PRN  diazepam    Tablet 5 milliGRAM(s) Oral at bedtime  enoxaparin Injectable 40 milliGRAM(s) SubCutaneous every 24 hours  insulin lispro (ADMELOG) corrective regimen sliding scale   SubCutaneous three times a day before meals  insulin lispro (ADMELOG) corrective regimen sliding scale   SubCutaneous at bedtime  metoclopramide Injectable 10 milliGRAM(s) IV Push every 8 hours  metoprolol succinate ER 50 milliGRAM(s) Oral daily  mupirocin 2% Ointment 1 Application(s) Topical two times a day  pantoprazole    Tablet 40 milliGRAM(s) Oral before breakfast  polyethylene glycol 3350 17 Gram(s) Oral daily  senna 2 Tablet(s) Oral at bedtime  sodium chloride 0.9% lock flush 3 milliLiter(s) IV Push every 8 hours      PHYSICAL EXAM    Subjective: "I feel tired."   Neurology: alert and oriented x 3, nonfocal, no gross deficits  CV : tele:  RSR    Sternal Wound :  CDI with dressing; sternum stable   Lungs: clear. RR easy, unlabored   Abdomen: soft, nontender, nondistended, positive bowel sounds,  +bowel movement; Neg N/V/D   :  pt voiding without difficulty   Extremities:   TORRES; neg LE edema, neg calf tenderness.   PPP bilaterally        PW: no  Chest tubes: none

## 2022-04-04 NOTE — DISCHARGE NOTE NURSING/CASE MANAGEMENT/SOCIAL WORK - NSDCPEFALRISK_GEN_ALL_CORE
For information on Fall & Injury Prevention, visit: [email or web address] OR  [email or web address] OR  https://www.cdc.gov/steadi/patient.html

## 2022-04-04 NOTE — DISCHARGE NOTE NURSING/CASE MANAGEMENT/SOCIAL WORK - PATIENT PORTAL LINK FT
You can access the FollowMyHealth Patient Portal offered by [facility] by registering at the following website: [email or web address]. By joining [employer]’s FollowMyHealth portal, you will also be able to view your health information using other applications (apps) compatible with our system.

## 2022-04-04 NOTE — PROGRESS NOTE ADULT - ASSESSMENT
This is a 76 y/o female with PMHx of HTN, HLD, GERD, CAD, prediabetes, migraine (on Fiorcet and Valium), hair loss (on minoxidil) recently diagnosed right atrial myxoma diagnosed by Dr Kiko Hurtado. Pt with strong family hx of heart dz: grandmother  suddenly at age 65, brother  at 76 from MI, younger brother  from MI age at 59, 3rd brother with CABG.  Pt presents with left heart cath with Dr Oneal today prior to scheduled myxoma excision with Dr Wade 22. LHC shows non-obs CAD.     On 22, pt underwent excision of RA myxoma, beating heart via r atriotomy  post-op course unremarkable   hx migraines - valium resumed hs & Bultabilital resumed  4/3 tr. fl- SR 95 - lopressor increased to 25mg po q8h.    d/c plan d/c home ?monday/tuesday   This is a 76 y/o female with PMHx of HTN, HLD, GERD, CAD, prediabetes, migraine (on Fiorcet and Valium), hair loss (on minoxidil) recently diagnosed right atrial myxoma diagnosed by Dr Kiko Hurtado. Pt with strong family hx of heart dz: grandmother  suddenly at age 65, brother  at 76 from MI, younger brother  from MI age at 59, 3rd brother with CABG.  Pt presents with left heart cath with Dr Oneal today prior to scheduled myxoma excision with Dr Wade 22. LHC shows non-obs CAD.     On 22, pt underwent excision of RA myxoma, beating heart via r atriotomy  post-op course unremarkable   hx migraines - valium resumed hs & Bultabilital resumed  4/3 tr. fl- SR 95 - lopressor increased to 25mg po q8h.     VSS; rsr 70-90; change lopressor to toprol 50 qd as per Dr. Wade  discharge planning- home tue as per Dr. Wade

## 2022-04-05 ENCOUNTER — TRANSCRIPTION ENCOUNTER (OUTPATIENT)
Age: 77
End: 2022-04-05

## 2022-04-05 VITALS — WEIGHT: 198.2 LBS

## 2022-04-05 PROBLEM — E53.8 DEFICIENCY OF OTHER SPECIFIED B GROUP VITAMINS: Chronic | Status: ACTIVE | Noted: 2022-03-31

## 2022-04-05 PROBLEM — K44.9 DIAPHRAGMATIC HERNIA WITHOUT OBSTRUCTION OR GANGRENE: Chronic | Status: ACTIVE | Noted: 2022-03-31

## 2022-04-05 PROBLEM — G44.009 CLUSTER HEADACHE SYNDROME, UNSPECIFIED, NOT INTRACTABLE: Chronic | Status: ACTIVE | Noted: 2022-03-31

## 2022-04-05 PROBLEM — I25.10 ATHEROSCLEROTIC HEART DISEASE OF NATIVE CORONARY ARTERY WITHOUT ANGINA PECTORIS: Chronic | Status: ACTIVE | Noted: 2022-03-31

## 2022-04-05 PROBLEM — Z87.19 PERSONAL HISTORY OF OTHER DISEASES OF THE DIGESTIVE SYSTEM: Chronic | Status: ACTIVE | Noted: 2022-03-31

## 2022-04-05 PROBLEM — D15.1 BENIGN NEOPLASM OF HEART: Chronic | Status: ACTIVE | Noted: 2022-03-31

## 2022-04-05 PROBLEM — E78.5 HYPERLIPIDEMIA, UNSPECIFIED: Chronic | Status: ACTIVE | Noted: 2022-03-31

## 2022-04-05 PROBLEM — M60.9 MYOSITIS, UNSPECIFIED: Chronic | Status: ACTIVE | Noted: 2022-03-31

## 2022-04-05 PROBLEM — I10 ESSENTIAL (PRIMARY) HYPERTENSION: Chronic | Status: ACTIVE | Noted: 2022-03-31

## 2022-04-05 PROBLEM — K58.9 IRRITABLE BOWEL SYNDROME WITHOUT DIARRHEA: Chronic | Status: ACTIVE | Noted: 2022-03-31

## 2022-04-05 PROBLEM — K21.9 GASTRO-ESOPHAGEAL REFLUX DISEASE WITHOUT ESOPHAGITIS: Chronic | Status: ACTIVE | Noted: 2022-03-31

## 2022-04-05 PROBLEM — M19.90 UNSPECIFIED OSTEOARTHRITIS, UNSPECIFIED SITE: Chronic | Status: ACTIVE | Noted: 2022-03-31

## 2022-04-05 PROBLEM — L65.9 NONSCARRING HAIR LOSS, UNSPECIFIED: Chronic | Status: ACTIVE | Noted: 2022-03-31

## 2022-04-05 PROBLEM — K58.9 IRRITABLE BOWEL SYNDROME, UNSPECIFIED: Chronic | Status: ACTIVE | Noted: 2022-03-31

## 2022-04-05 PROBLEM — F41.1 GENERALIZED ANXIETY DISORDER: Chronic | Status: ACTIVE | Noted: 2022-03-31

## 2022-04-05 PROBLEM — G43.909 MIGRAINE, UNSPECIFIED, NOT INTRACTABLE, WITHOUT STATUS MIGRAINOSUS: Chronic | Status: ACTIVE | Noted: 2022-03-31

## 2022-04-05 PROBLEM — Z87.898 PERSONAL HISTORY OF OTHER SPECIFIED CONDITIONS: Chronic | Status: ACTIVE | Noted: 2022-03-31

## 2022-04-05 PROBLEM — G47.00 INSOMNIA, UNSPECIFIED: Chronic | Status: ACTIVE | Noted: 2022-03-31

## 2022-04-05 PROBLEM — E55.9 VITAMIN D DEFICIENCY, UNSPECIFIED: Chronic | Status: ACTIVE | Noted: 2022-03-31

## 2022-04-05 PROBLEM — M75.100 UNSPECIFIED ROTATOR CUFF TEAR OR RUPTURE OF UNSPECIFIED SHOULDER, NOT SPECIFIED AS TRAUMATIC: Chronic | Status: ACTIVE | Noted: 2022-03-31

## 2022-04-05 PROBLEM — M51.36 OTHER INTERVERTEBRAL DISC DEGENERATION, LUMBAR REGION: Chronic | Status: ACTIVE | Noted: 2022-03-31

## 2022-04-05 LAB
ANION GAP SERPL CALC-SCNC: 10 MMOL/L — SIGNIFICANT CHANGE UP (ref 5–17)
BUN SERPL-MCNC: 13 MG/DL — SIGNIFICANT CHANGE UP (ref 7–23)
CALCIUM SERPL-MCNC: 8.9 MG/DL — SIGNIFICANT CHANGE UP (ref 8.4–10.5)
CHLORIDE SERPL-SCNC: 104 MMOL/L — SIGNIFICANT CHANGE UP (ref 96–108)
CO2 SERPL-SCNC: 25 MMOL/L — SIGNIFICANT CHANGE UP (ref 22–31)
CREAT SERPL-MCNC: 0.6 MG/DL — SIGNIFICANT CHANGE UP (ref 0.5–1.3)
EGFR: 94 ML/MIN/1.73M2 — SIGNIFICANT CHANGE UP
GLUCOSE BLDC GLUCOMTR-MCNC: 125 MG/DL — HIGH (ref 70–99)
GLUCOSE SERPL-MCNC: 127 MG/DL — HIGH (ref 70–99)
HCT VFR BLD CALC: 27.6 % — LOW (ref 34.5–45)
HGB BLD-MCNC: 9.1 G/DL — LOW (ref 11.5–15.5)
MCHC RBC-ENTMCNC: 29.5 PG — SIGNIFICANT CHANGE UP (ref 27–34)
MCHC RBC-ENTMCNC: 33 GM/DL — SIGNIFICANT CHANGE UP (ref 32–36)
MCV RBC AUTO: 89.6 FL — SIGNIFICANT CHANGE UP (ref 80–100)
NRBC # BLD: 0 /100 WBCS — SIGNIFICANT CHANGE UP (ref 0–0)
PLATELET # BLD AUTO: 142 K/UL — LOW (ref 150–400)
POTASSIUM SERPL-MCNC: 4.1 MMOL/L — SIGNIFICANT CHANGE UP (ref 3.5–5.3)
POTASSIUM SERPL-SCNC: 4.1 MMOL/L — SIGNIFICANT CHANGE UP (ref 3.5–5.3)
RBC # BLD: 3.08 M/UL — LOW (ref 3.8–5.2)
RBC # FLD: 12.9 % — SIGNIFICANT CHANGE UP (ref 10.3–14.5)
SODIUM SERPL-SCNC: 139 MMOL/L — SIGNIFICANT CHANGE UP (ref 135–145)
WBC # BLD: 6.44 K/UL — SIGNIFICANT CHANGE UP (ref 3.8–10.5)
WBC # FLD AUTO: 6.44 K/UL — SIGNIFICANT CHANGE UP (ref 3.8–10.5)

## 2022-04-05 RX ORDER — ASPIRIN/CALCIUM CARB/MAGNESIUM 324 MG
1 TABLET ORAL
Qty: 30 | Refills: 0
Start: 2022-04-05 | End: 2022-05-04

## 2022-04-05 RX ORDER — FUROSEMIDE 40 MG
1 TABLET ORAL
Qty: 5 | Refills: 0
Start: 2022-04-05 | End: 2022-04-09

## 2022-04-05 RX ORDER — ACETAMINOPHEN 500 MG
2 TABLET ORAL
Qty: 0 | Refills: 0 | DISCHARGE
Start: 2022-04-05

## 2022-04-05 RX ORDER — METOPROLOL TARTRATE 50 MG
1.5 TABLET ORAL
Qty: 0 | Refills: 0 | DISCHARGE

## 2022-04-05 RX ORDER — METOPROLOL TARTRATE 50 MG
1 TABLET ORAL
Qty: 30 | Refills: 0
Start: 2022-04-05 | End: 2022-05-04

## 2022-04-05 RX ADMIN — PANTOPRAZOLE SODIUM 40 MILLIGRAM(S): 20 TABLET, DELAYED RELEASE ORAL at 06:10

## 2022-04-05 RX ADMIN — SODIUM CHLORIDE 3 MILLILITER(S): 9 INJECTION INTRAMUSCULAR; INTRAVENOUS; SUBCUTANEOUS at 06:25

## 2022-04-05 RX ADMIN — MUPIROCIN 1 APPLICATION(S): 20 OINTMENT TOPICAL at 06:09

## 2022-04-05 NOTE — DISCHARGE NOTE PROVIDER - NSDCPNSUBOBJ_GEN_ALL_CORE
PHYSICAL EXAM    Subjective: "I feel tired."   Neurology: alert and oriented x 3, nonfocal, no gross deficits  CV : tele:  RSR    Sternal Wound :  CDI with dressing; sternum stable   Lungs: clear. RR easy, unlabored   Abdomen: soft, nontender, nondistended, positive bowel sounds,  +bowel movement; Neg N/V/D   :  pt voiding without difficulty   Extremities:   TORRES; neg LE edema, neg calf tenderness.   PPP bilaterally

## 2022-04-05 NOTE — DISCHARGE NOTE PROVIDER - CARE PROVIDER_API CALL
Michael Wade)  Surgery; Thoracic and Cardiac Surgery  [address] [place] [address]  Phone: [phone]  Fax: [phone]  Follow Up Time:

## 2022-04-05 NOTE — DISCHARGE NOTE PROVIDER - NSDCCPCAREPLAN_GEN_ALL_CORE_FT
PRINCIPAL DISCHARGE DIAGNOSIS  Diagnosis: Atrial myxoma  Assessment and Plan of Treatment: s/p Right atrial myxoma removal

## 2022-04-05 NOTE — DISCHARGE NOTE PROVIDER - NSDCMRMEDTOKEN_GEN_ALL_CORE_FT
acetaminophen 325 mg oral tablet: 2 tab(s) orally every 6 hours, As needed, Mild Pain (1 - 3)  aspirin 81 mg oral delayed release tablet: 1 tab(s) orally once a day  biotin 10 mg oral tablet: 1 tab(s) orally once a day  butalbital-acetaminophen 50 mg-325 mg oral capsule: 1 tab(s) orally 2 times a day, As Needed for chronic tension and cluster HA and should and neck pain  Melatonin 5 mg oral tablet: 1 tab(s) orally once a day (at bedtime), As Needed FOR SLEEP  metoprolol succinate 50 mg oral tablet, extended release: 1 tab(s) orally once a day  minoxidil 5% topical solution: Apply topically to affected area 2 times a day APPLY TO SCALP  MiraLax oral powder for reconstitution: 17 gram(s) orally once a day, As Needed BEFORE BREAKFAST AS NEEDED  Multiple Vitamins oral tablet: tab(s) orally once a day  Mylanta oral suspension: 10 milliliter(s) orally 2 times a day, As Needed  NexIUM 20 mg oral delayed release capsule: 1 cap(s) orally once a day (at times takes to capsules)  rosuvastatin 5 mg oral tablet: 1 tab(s) orally once a day  Valium 5 mg oral tablet: 1 tab(s) orally once a day, As Needed anxiety or sleep  Vitamin D3 25 mcg (1000 intl units) oral tablet: 1 tab(s) orally once a day   acetaminophen 325 mg oral tablet: 2 tab(s) orally every 6 hours, As needed, Mild Pain (1 - 3)  aspirin 81 mg oral delayed release tablet: 1 tab(s) orally once a day  biotin 10 mg oral tablet: 1 tab(s) orally once a day  butalbital-acetaminophen 50 mg-325 mg oral capsule: 1 tab(s) orally 2 times a day, As Needed for chronic tension and cluster HA and should and neck pain  furosemide 20 mg oral tablet: 1 tab(s) orally once a day MDD:1   Melatonin 5 mg oral tablet: 1 tab(s) orally once a day (at bedtime), As Needed FOR SLEEP  metoprolol succinate 50 mg oral tablet, extended release: 1 tab(s) orally once a day  minoxidil 5% topical solution: Apply topically to affected area 2 times a day APPLY TO SCALP  MiraLax oral powder for reconstitution: 17 gram(s) orally once a day, As Needed BEFORE BREAKFAST AS NEEDED  Multiple Vitamins oral tablet: tab(s) orally once a day  Mylanta oral suspension: 10 milliliter(s) orally 2 times a day, As Needed  NexIUM 20 mg oral delayed release capsule: 1 cap(s) orally once a day (at times takes to capsules)  rosuvastatin 5 mg oral tablet: 1 tab(s) orally once a day  Valium 5 mg oral tablet: 1 tab(s) orally once a day, As Needed anxiety or sleep  Vitamin D3 25 mcg (1000 intl units) oral tablet: 1 tab(s) orally once a day

## 2022-04-05 NOTE — DISCHARGE NOTE PROVIDER - NSDCFUADDAPPT_GEN_ALL_CORE_FT
follow up appt with Dr. Michael Wade in 2 weeks  follow up appt with your Cardiologist in 2-3 weeks  follow up appt with Dr. Michael Wade on April 15 @ 1pm  follow up appt with your Cardiologist in 2-3 weeks  follow up appt with Dr. Michael Wade on Friday, April 15 @ 1pm  follow up appt with your Cardiologist in 2-3 weeks

## 2022-04-05 NOTE — DISCHARGE NOTE PROVIDER - NSDCFUSCHEDAPPT_GEN_ALL_CORE_FT
AMADOU POSADAS T ; 04/21/2022 ; [facility] [address]  AMADOU POSADAS T ; 05/26/2022 ; NPP ONCORTHO [address]  AMADOU POSADAS T ; 06/07/2022 ; NPP PULMMED [address]  AMADOU POSADAS T ; 06/22/2022 ; NPP OPHTHALM [address] AMADOU POSADAS T ; 04/15/2022 ; NPP CT [address] AMADOU Hughes T ; 04/21/2022 ; [facility] [address]  AMADOU POSADAS T ; 05/26/2022 ; NPP [address]CORTHO [address]  AMADOU POSADAS T ; 06/07/2022 ; NPP PULMMED [address]  AMADOU POSADAS T ; 06/22/2022 ; NPP OPHTHALM [address] [facility]

## 2022-04-06 ENCOUNTER — NON-APPOINTMENT (OUTPATIENT)
Age: 77
End: 2022-04-06

## 2022-04-06 RX ORDER — METOPROLOL SUCCINATE 25 MG/1
25 TABLET, EXTENDED RELEASE ORAL TWICE DAILY
Qty: 270 | Refills: 3 | Status: DISCONTINUED | COMMUNITY
Start: 2021-04-08 | End: 2022-04-06

## 2022-04-06 RX ORDER — DICLOFENAC SODIUM 1% 10 MG/G
1 GEL TOPICAL DAILY
Qty: 1 | Refills: 3 | Status: DISCONTINUED | COMMUNITY
Start: 2022-03-17 | End: 2022-04-06

## 2022-04-06 RX ORDER — ASPIRIN ENTERIC COATED TABLETS 81 MG 81 MG/1
81 TABLET, DELAYED RELEASE ORAL
Qty: 90 | Refills: 0 | Status: ACTIVE | COMMUNITY
Start: 2022-04-06

## 2022-04-06 RX ORDER — DIAZEPAM 5 MG/1
5 TABLET ORAL 3 TIMES DAILY
Qty: 90 | Refills: 0 | Status: DISCONTINUED | COMMUNITY
Start: 2021-06-07 | End: 2022-04-06

## 2022-04-08 ENCOUNTER — APPOINTMENT (OUTPATIENT)
Dept: CARE COORDINATION | Facility: HOME HEALTH | Age: 77
End: 2022-04-08
Payer: MEDICARE

## 2022-04-08 VITALS
RESPIRATION RATE: 16 BRPM | OXYGEN SATURATION: 98 % | DIASTOLIC BLOOD PRESSURE: 82 MMHG | BODY MASS INDEX: 31.31 KG/M2 | HEART RATE: 90 BPM | SYSTOLIC BLOOD PRESSURE: 128 MMHG | WEIGHT: 194 LBS

## 2022-04-08 PROCEDURE — 99024 POSTOP FOLLOW-UP VISIT: CPT

## 2022-04-08 RX ORDER — BUTALBITAL, ACETAMINOPHEN, AND CAFFEINE 50; 300; 40 MG/1; MG/1; MG/1
50-300-40 CAPSULE ORAL 3 TIMES DAILY
Refills: 0 | Status: DISCONTINUED | COMMUNITY
End: 2022-04-08

## 2022-04-08 NOTE — HISTORY OF PRESENT ILLNESS
[FreeTextEntry1] : This is a 78 y/o female with PMHx of HTN, HLD, GERD, CAD, prediabetes, migraine\par (on Fiorcet and Valium), hair loss (on minoxidil) recently diagnosed right\par atrial myxoma diagnosed by Dr Kiko Hurtado. Pt with strong family hx of heart\par dz: grandmother  suddenly at age 65, brother  at 76 from MI, younger\par brother  from MI age at 59, 3rd brother with CABG. Pt presents with left\par heart cath with Dr Oneal today prior to scheduled myxoma excision with Carly Wade 22. LHC shows non-obs CAD.\par \par pt recovering at home with , good support\par education and emotional support provided\par all questions answered

## 2022-04-08 NOTE — PHYSICAL EXAM
[] : no respiratory distress [Respiration, Rhythm And Depth] : normal respiratory rhythm and effort [Auscultation Breath Sounds / Voice Sounds] : lungs were clear to auscultation bilaterally [Heart Rate And Rhythm] : heart rate was normal and rhythm regular [Heart Sounds] : normal S1 and S2 [FreeTextEntry1] : MSI, CT sites and SVG sites without erythema, drainage or warmth, with edges well approximated.  Sternum stable. BLE 1+ edema [Bowel Sounds] : normal bowel sounds [Abdomen Soft] : soft [Abdomen Tenderness] : non-tender

## 2022-04-11 LAB — SURGICAL PATHOLOGY STUDY: SIGNIFICANT CHANGE UP

## 2022-04-14 ENCOUNTER — APPOINTMENT (OUTPATIENT)
Dept: CARDIOTHORACIC SURGERY | Facility: CLINIC | Age: 77
End: 2022-04-14
Payer: MEDICARE

## 2022-04-14 VITALS
TEMPERATURE: 98.5 F | HEART RATE: 96 BPM | BODY MASS INDEX: 30.53 KG/M2 | RESPIRATION RATE: 16 BRPM | OXYGEN SATURATION: 98 % | DIASTOLIC BLOOD PRESSURE: 90 MMHG | WEIGHT: 190 LBS | HEIGHT: 66 IN | SYSTOLIC BLOOD PRESSURE: 136 MMHG

## 2022-04-14 PROBLEM — Z86.018 HISTORY OF ATRIAL MYXOMA: Status: RESOLVED | Noted: 2022-03-12 | Resolved: 2022-04-14

## 2022-04-14 PROCEDURE — 93000 ELECTROCARDIOGRAM COMPLETE: CPT

## 2022-04-14 PROCEDURE — 99024 POSTOP FOLLOW-UP VISIT: CPT

## 2022-04-14 RX ORDER — FUROSEMIDE 20 MG/1
20 TABLET ORAL
Qty: 30 | Refills: 2 | Status: COMPLETED | COMMUNITY
Start: 2022-04-06 | End: 2022-04-14

## 2022-04-14 RX ORDER — MINOXIDIL 5 %
5 SOLUTION, NON-ORAL TOPICAL TWICE DAILY
Refills: 0 | Status: COMPLETED | COMMUNITY
End: 2022-04-14

## 2022-04-15 ENCOUNTER — APPOINTMENT (OUTPATIENT)
Dept: CARDIOTHORACIC SURGERY | Facility: CLINIC | Age: 77
End: 2022-04-15
Payer: MEDICARE

## 2022-04-15 DIAGNOSIS — Z86.018 PERSONAL HISTORY OF OTHER BENIGN NEOPLASM: ICD-10-CM

## 2022-04-15 DIAGNOSIS — R93.1 ABNORMAL FINDINGS ON DIAGNOSTIC IMAGING OF HEART AND CORONARY CIRCULATION: ICD-10-CM

## 2022-04-18 ENCOUNTER — APPOINTMENT (OUTPATIENT)
Dept: FAMILY MEDICINE | Facility: CLINIC | Age: 77
End: 2022-04-18
Payer: MEDICARE

## 2022-04-18 VITALS
OXYGEN SATURATION: 95 % | SYSTOLIC BLOOD PRESSURE: 110 MMHG | HEIGHT: 66 IN | HEART RATE: 85 BPM | TEMPERATURE: 98.6 F | RESPIRATION RATE: 14 BRPM | BODY MASS INDEX: 30.7 KG/M2 | DIASTOLIC BLOOD PRESSURE: 70 MMHG | WEIGHT: 191 LBS

## 2022-04-18 VITALS — TEMPERATURE: 98.6 F

## 2022-04-18 VITALS
SYSTOLIC BLOOD PRESSURE: 110 MMHG | RESPIRATION RATE: 14 BRPM | HEIGHT: 66 IN | OXYGEN SATURATION: 95 % | WEIGHT: 191 LBS | BODY MASS INDEX: 30.7 KG/M2 | DIASTOLIC BLOOD PRESSURE: 70 MMHG | HEART RATE: 85 BPM

## 2022-04-18 DIAGNOSIS — Z86.018 PERSONAL HISTORY OF OTHER BENIGN NEOPLASM: ICD-10-CM

## 2022-04-18 PROCEDURE — 99495 TRANSJ CARE MGMT MOD F2F 14D: CPT

## 2022-04-18 RX ORDER — METOPROLOL SUCCINATE 50 MG/1
50 TABLET, EXTENDED RELEASE ORAL
Qty: 30 | Refills: 0 | Status: DISCONTINUED | COMMUNITY
Start: 2022-04-06 | End: 2022-04-18

## 2022-04-18 NOTE — PLAN
[FreeTextEntry1] : the discharge summary was reviewed, the metoprolol ER dose was changed to 25 mg bid, I-STOP checked and Valium renewed, she will apply the diclofenac patch as needed and follow up with specialists as scheduled and with me in 3 months or sooner prn

## 2022-04-18 NOTE — REVIEW OF SYSTEMS
[Fatigue] : fatigue [Chest Pain] : chest pain [Lower Ext Edema] : lower extremity edema [Headache] : headache [Dizziness] : dizziness [Insomnia] : insomnia [Anxiety] : anxiety [Negative] : Heme/Lymph [Suicidal] : not suicidal [Depression] : no depression [FreeTextEntry5] : chest soreness from the surgery [de-identified] : itching of the wound and has a suture coming through

## 2022-04-18 NOTE — PHYSICAL EXAM
[No Acute Distress] : no acute distress [Well Nourished] : well nourished [Well Developed] : well developed [Well-Appearing] : well-appearing [No Respiratory Distress] : no respiratory distress  [No Accessory Muscle Use] : no accessory muscle use [Clear to Auscultation] : lungs were clear to auscultation bilaterally [Normal Rate] : normal rate  [Regular Rhythm] : with a regular rhythm [Normal S1, S2] : normal S1 and S2 [Coordination Grossly Intact] : coordination grossly intact [Speech Grossly Normal] : speech grossly normal [Normal Mood] : the mood was normal [de-identified] : trace nonpitting edema of bilateral feet [de-identified] : healing surgical wound with one tiny suture protruding

## 2022-04-19 ENCOUNTER — APPOINTMENT (OUTPATIENT)
Dept: CARE COORDINATION | Facility: HOME HEALTH | Age: 77
End: 2022-04-19
Payer: MEDICARE

## 2022-04-19 VITALS
DIASTOLIC BLOOD PRESSURE: 78 MMHG | HEART RATE: 84 BPM | BODY MASS INDEX: 30.7 KG/M2 | HEIGHT: 66 IN | RESPIRATION RATE: 15 BRPM | WEIGHT: 191 LBS | OXYGEN SATURATION: 98 % | SYSTOLIC BLOOD PRESSURE: 118 MMHG

## 2022-04-19 PROCEDURE — 99024 POSTOP FOLLOW-UP VISIT: CPT

## 2022-04-21 ENCOUNTER — APPOINTMENT (OUTPATIENT)
Dept: FAMILY MEDICINE | Facility: CLINIC | Age: 77
End: 2022-04-21

## 2022-04-26 PROCEDURE — C1889: CPT

## 2022-04-26 PROCEDURE — 85027 COMPLETE CBC AUTOMATED: CPT

## 2022-04-26 PROCEDURE — 84443 ASSAY THYROID STIM HORMONE: CPT

## 2022-04-26 PROCEDURE — 84295 ASSAY OF SERUM SODIUM: CPT

## 2022-04-26 PROCEDURE — C1769: CPT

## 2022-04-26 PROCEDURE — 87641 MR-STAPH DNA AMP PROBE: CPT

## 2022-04-26 PROCEDURE — 80053 COMPREHEN METABOLIC PANEL: CPT

## 2022-04-26 PROCEDURE — 84132 ASSAY OF SERUM POTASSIUM: CPT

## 2022-04-26 PROCEDURE — 97162 PT EVAL MOD COMPLEX 30 MIN: CPT

## 2022-04-26 PROCEDURE — 85610 PROTHROMBIN TIME: CPT

## 2022-04-26 PROCEDURE — 83036 HEMOGLOBIN GLYCOSYLATED A1C: CPT

## 2022-04-26 PROCEDURE — C1729: CPT

## 2022-04-26 PROCEDURE — 83880 ASSAY OF NATRIURETIC PEPTIDE: CPT

## 2022-04-26 PROCEDURE — 82962 GLUCOSE BLOOD TEST: CPT

## 2022-04-26 PROCEDURE — 85014 HEMATOCRIT: CPT

## 2022-04-26 PROCEDURE — 84480 ASSAY TRIIODOTHYRONINE (T3): CPT

## 2022-04-26 PROCEDURE — 82330 ASSAY OF CALCIUM: CPT

## 2022-04-26 PROCEDURE — 82565 ASSAY OF CREATININE: CPT

## 2022-04-26 PROCEDURE — 83690 ASSAY OF LIPASE: CPT

## 2022-04-26 PROCEDURE — 82553 CREATINE MB FRACTION: CPT

## 2022-04-26 PROCEDURE — 85018 HEMOGLOBIN: CPT

## 2022-04-26 PROCEDURE — 85025 COMPLETE CBC W/AUTO DIFF WBC: CPT

## 2022-04-26 PROCEDURE — P9045: CPT

## 2022-04-26 PROCEDURE — 82435 ASSAY OF BLOOD CHLORIDE: CPT

## 2022-04-26 PROCEDURE — 85396 CLOTTING ASSAY WHOLE BLOOD: CPT

## 2022-04-26 PROCEDURE — C1894: CPT

## 2022-04-26 PROCEDURE — 82150 ASSAY OF AMYLASE: CPT

## 2022-04-26 PROCEDURE — 86891 AUTOLOGOUS BLOOD OP SALVAGE: CPT

## 2022-04-26 PROCEDURE — 80048 BASIC METABOLIC PNL TOTAL CA: CPT

## 2022-04-26 PROCEDURE — 80076 HEPATIC FUNCTION PANEL: CPT

## 2022-04-26 PROCEDURE — 93306 TTE W/DOPPLER COMPLETE: CPT

## 2022-04-26 PROCEDURE — 86923 COMPATIBILITY TEST ELECTRIC: CPT

## 2022-04-26 PROCEDURE — 93005 ELECTROCARDIOGRAM TRACING: CPT

## 2022-04-26 PROCEDURE — 88305 TISSUE EXAM BY PATHOLOGIST: CPT

## 2022-04-26 PROCEDURE — 93454 CORONARY ARTERY ANGIO S&I: CPT

## 2022-04-26 PROCEDURE — P9047: CPT

## 2022-04-26 PROCEDURE — 81003 URINALYSIS AUTO W/O SCOPE: CPT

## 2022-04-26 PROCEDURE — C1887: CPT

## 2022-04-26 PROCEDURE — 84100 ASSAY OF PHOSPHORUS: CPT

## 2022-04-26 PROCEDURE — C9399: CPT

## 2022-04-26 PROCEDURE — 99152 MOD SED SAME PHYS/QHP 5/>YRS: CPT

## 2022-04-26 PROCEDURE — 97165 OT EVAL LOW COMPLEX 30 MIN: CPT

## 2022-04-26 PROCEDURE — 36415 COLL VENOUS BLD VENIPUNCTURE: CPT

## 2022-04-26 PROCEDURE — 82803 BLOOD GASES ANY COMBINATION: CPT

## 2022-04-26 PROCEDURE — 87640 STAPH A DNA AMP PROBE: CPT

## 2022-04-26 PROCEDURE — 82947 ASSAY GLUCOSE BLOOD QUANT: CPT

## 2022-04-26 PROCEDURE — 84484 ASSAY OF TROPONIN QUANT: CPT

## 2022-04-26 PROCEDURE — 84436 ASSAY OF TOTAL THYROXINE: CPT

## 2022-04-26 PROCEDURE — 82550 ASSAY OF CK (CPK): CPT

## 2022-04-26 PROCEDURE — 86850 RBC ANTIBODY SCREEN: CPT

## 2022-04-26 PROCEDURE — U0003: CPT

## 2022-04-26 PROCEDURE — 85384 FIBRINOGEN ACTIVITY: CPT

## 2022-04-26 PROCEDURE — 80061 LIPID PANEL: CPT

## 2022-04-26 PROCEDURE — 86901 BLOOD TYPING SEROLOGIC RH(D): CPT

## 2022-04-26 PROCEDURE — 83605 ASSAY OF LACTIC ACID: CPT

## 2022-04-26 PROCEDURE — 85730 THROMBOPLASTIN TIME PARTIAL: CPT

## 2022-04-26 PROCEDURE — U0005: CPT

## 2022-04-26 PROCEDURE — 71045 X-RAY EXAM CHEST 1 VIEW: CPT

## 2022-04-26 PROCEDURE — 83735 ASSAY OF MAGNESIUM: CPT

## 2022-04-26 PROCEDURE — C1751: CPT

## 2022-04-26 PROCEDURE — 86900 BLOOD TYPING SEROLOGIC ABO: CPT

## 2022-04-30 ENCOUNTER — TRANSCRIPTION ENCOUNTER (OUTPATIENT)
Age: 77
End: 2022-04-30

## 2022-05-02 ENCOUNTER — APPOINTMENT (OUTPATIENT)
Dept: FAMILY MEDICINE | Facility: CLINIC | Age: 77
End: 2022-05-02
Payer: MEDICARE

## 2022-05-02 ENCOUNTER — NON-APPOINTMENT (OUTPATIENT)
Age: 77
End: 2022-05-02

## 2022-05-02 VITALS
SYSTOLIC BLOOD PRESSURE: 134 MMHG | OXYGEN SATURATION: 98 % | HEIGHT: 66 IN | HEART RATE: 90 BPM | RESPIRATION RATE: 15 BRPM | BODY MASS INDEX: 30.7 KG/M2 | WEIGHT: 191 LBS | DIASTOLIC BLOOD PRESSURE: 82 MMHG

## 2022-05-02 VITALS — TEMPERATURE: 98.3 F

## 2022-05-02 DIAGNOSIS — M79.10 MYALGIA, UNSPECIFIED SITE: ICD-10-CM

## 2022-05-02 DIAGNOSIS — Z09 ENCOUNTER FOR FOLLOW-UP EXAMINATION AFTER COMPLETED TREATMENT FOR CONDITIONS OTHER THAN MALIGNANT NEOPLASM: ICD-10-CM

## 2022-05-02 PROCEDURE — 93000 ELECTROCARDIOGRAM COMPLETE: CPT

## 2022-05-02 PROCEDURE — 36415 COLL VENOUS BLD VENIPUNCTURE: CPT

## 2022-05-02 PROCEDURE — 99214 OFFICE O/P EST MOD 30 MIN: CPT | Mod: 25

## 2022-05-02 NOTE — PHYSICAL EXAM
[No Acute Distress] : no acute distress [Well Nourished] : well nourished [Well Developed] : well developed [Normal Voice/Communication] : normal voice/communication [Normal Sclera/Conjunctiva] : normal sclera/conjunctiva [Normal Outer Ear/Nose] : the outer ears and nose were normal in appearance [Normal Oropharynx] : the oropharynx was normal [Normal TMs] : both tympanic membranes were normal [No Lymphadenopathy] : no lymphadenopathy [Supple] : supple [No Respiratory Distress] : no respiratory distress  [No Accessory Muscle Use] : no accessory muscle use [Clear to Auscultation] : lungs were clear to auscultation bilaterally [Normal Rate] : normal rate  [Regular Rhythm] : with a regular rhythm [Normal S1, S2] : normal S1 and S2 [Soft] : abdomen soft [Non Tender] : non-tender [Non-distended] : non-distended [Coordination Grossly Intact] : coordination grossly intact [Normal Mood] : the mood was normal [de-identified] : the surgical wound is without redness, swelling or purulent drainage

## 2022-05-02 NOTE — PHYSICAL EXAM
[Sclera] : the sclera and conjunctiva were normal [Neck Appearance] : the appearance of the neck was normal [Respiration, Rhythm And Depth] : normal respiratory rhythm and effort [Auscultation Breath Sounds / Voice Sounds] : lungs were clear to auscultation bilaterally [Heart Rate And Rhythm] : heart rate was normal and rhythm regular [Heart Sounds] : normal S1 and S2 [1+] : left 1+ [Bowel Sounds] : normal bowel sounds [Abdomen Soft] : soft [Abdomen Tenderness] : non-tender [Abnormal Walk] : normal gait [Skin Color & Pigmentation] : normal skin color and pigmentation [Skin Turgor] : normal skin turgor [] : no rash [Impaired Insight] : insight and judgment were intact [Oriented To Time, Place, And Person] : oriented to person, place, and time [Affect] : the affect was normal [FreeTextEntry1] : see above [FreeTextEntry2] : B/L LE with trace pedal edema, B/L calves soft, NT

## 2022-05-02 NOTE — PLAN
[FreeTextEntry1] : the EKG was reviewed and doesn't show any acute changes compared with prior ones, labs were ordered to further assess her health and she will be notified with results, she was advised to apply heat to her neck and to take diazepam up to 3 daily if needed and to follow up ASAP for any worsening symptoms

## 2022-05-02 NOTE — HISTORY OF PRESENT ILLNESS
[FreeTextEntry8] : Patient presents with stomach pain that started Thursday afternoon, had diarrhea for 10 hours. Had aches and pains, cough. States her neck/shoulders, and spot of incision from her surgery aches. She started with diarrhea on 4/28.  She had been taking Miralax prior to that for constipation.  She had diarrhea for 10 hours with crampy abdominal pain.  Then she started with body aches in the back of her neck and in the front of her neck and into the chest.  She didn't have pain with swallowing.  She did have nausea but never vomited.  She has a mild cough.  She feels too fatigued to use the incentive spirometer.  She isn't having regular bowel movments now, sometimes the stool is very hard and sometimes it is loose.  She stopped taking the Miralax.  She has a poor appetite. Her home Covid swab today was negative.  The cough is dry.  She is taking the Fioricet

## 2022-05-02 NOTE — REVIEW OF SYSTEMS
[Fatigue] : fatigue [Earache] : earache [Sore Throat] : sore throat [Chest Pain] : chest pain [Cough] : cough [Abdominal Pain] : abdominal pain [Constipation] : constipation [Diarrhea] : diarrhea [Joint Pain] : joint pain [Muscle Weakness] : muscle weakness [Muscle Pain] : muscle pain [Back Pain] : back pain [Headache] : headache [Negative] : Heme/Lymph [Fever] : no fever [Shortness Of Breath] : no shortness of breath [Wheezing] : no wheezing [Skin Rash] : no skin rash

## 2022-05-02 NOTE — HISTORY OF PRESENT ILLNESS
[FreeTextEntry1] : This is a 78 y/o female with PMHx of HTN, HLD, GERD, CAD, prediabetes, migraine\par (on Fiorcet and Valium), hair loss (on minoxidil) recently diagnosed right\par atrial myxoma diagnosed by Dr Kiko Hurtado. Pt with strong family hx of heart\par dz: grandmother  suddenly at age 65, brother  at 76 from MI, younger\par brother  from MI age at 59, 3rd brother with CABG. Pt s/p scheduled myxoma excision with Carly Wade 22. Follow up [facility] visit completed for retained suture MSI that is "keeps getting stuck on my clothes"\par \par pt recovering at home with , good support\par education and emotional support provided\par all questions answered

## 2022-05-03 LAB
ANION GAP SERPL CALC-SCNC: 12 MMOL/L
BASOPHILS # BLD AUTO: 0.03 K/UL
BASOPHILS NFR BLD AUTO: 0.3 %
BUN SERPL-MCNC: 13 MG/DL
CALCIUM SERPL-MCNC: 9.1 MG/DL
CHLORIDE SERPL-SCNC: 102 MMOL/L
CO2 SERPL-SCNC: 23 MMOL/L
CREAT SERPL-MCNC: 0.66 MG/DL
EGFR: 91 ML/MIN/1.73M2
EOSINOPHIL # BLD AUTO: 0.1 K/UL
EOSINOPHIL NFR BLD AUTO: 1 %
GLUCOSE SERPL-MCNC: 171 MG/DL
HCT VFR BLD CALC: 34.1 %
HGB BLD-MCNC: 10.5 G/DL
IMM GRANULOCYTES NFR BLD AUTO: 0.3 %
LYMPHOCYTES # BLD AUTO: 0.6 K/UL
LYMPHOCYTES NFR BLD AUTO: 6.2 %
MAN DIFF?: NORMAL
MCHC RBC-ENTMCNC: 28.6 PG
MCHC RBC-ENTMCNC: 30.8 GM/DL
MCV RBC AUTO: 92.9 FL
MONOCYTES # BLD AUTO: 1.15 K/UL
MONOCYTES NFR BLD AUTO: 11.8 %
NEUTROPHILS # BLD AUTO: 7.81 K/UL
NEUTROPHILS NFR BLD AUTO: 80.4 %
PLATELET # BLD AUTO: 215 K/UL
POTASSIUM SERPL-SCNC: 4.2 MMOL/L
RBC # BLD: 3.67 M/UL
RBC # FLD: 12.9 %
SODIUM SERPL-SCNC: 137 MMOL/L
WBC # FLD AUTO: 9.72 K/UL

## 2022-05-03 RX ORDER — AZITHROMYCIN 250 MG/1
250 TABLET, FILM COATED ORAL DAILY
Qty: 6 | Refills: 0 | Status: COMPLETED | COMMUNITY
Start: 2022-05-03 | End: 2022-05-09

## 2022-05-04 ENCOUNTER — TRANSCRIPTION ENCOUNTER (OUTPATIENT)
Age: 77
End: 2022-05-04

## 2022-05-11 ENCOUNTER — NON-APPOINTMENT (OUTPATIENT)
Age: 77
End: 2022-05-11

## 2022-05-13 ENCOUNTER — APPOINTMENT (OUTPATIENT)
Dept: FAMILY MEDICINE | Facility: CLINIC | Age: 77
End: 2022-05-13
Payer: MEDICARE

## 2022-05-13 VITALS
DIASTOLIC BLOOD PRESSURE: 84 MMHG | BODY MASS INDEX: 30.53 KG/M2 | HEART RATE: 124 BPM | HEIGHT: 66 IN | SYSTOLIC BLOOD PRESSURE: 134 MMHG | WEIGHT: 190 LBS | TEMPERATURE: 98.4 F | RESPIRATION RATE: 15 BRPM | OXYGEN SATURATION: 95 %

## 2022-05-13 DIAGNOSIS — S16.1XXA STRAIN OF MUSCLE, FASCIA AND TENDON AT NECK LEVEL, INITIAL ENCOUNTER: ICD-10-CM

## 2022-05-13 PROCEDURE — 99214 OFFICE O/P EST MOD 30 MIN: CPT

## 2022-05-13 NOTE — PLAN
[FreeTextEntry1] : her conditions has deteriorated and she was advised to be seen at the ER urgently for hydration and further testing and treatment, she declined going via ambulance and her  escorted her directly to the ER, I called the triage nurse to give report

## 2022-05-13 NOTE — REVIEW OF SYSTEMS
[Fever] : no fever [Chills] : no chills [Fatigue] : fatigue [Earache] : earache [Sore Throat] : sore throat [Lower Ext Edema] : no lower extremity edema [Shortness Of Breath] : shortness of breath [Wheezing] : wheezing [Cough] : cough [Muscle Weakness] : muscle weakness [Headache] : headache [Negative] : Heme/Lymph

## 2022-05-13 NOTE — PHYSICAL EXAM
[Well Developed] : well developed [Ill-Appearing] : ill-appearing [No Lymphadenopathy] : no lymphadenopathy [Supple] : supple [No Respiratory Distress] : no respiratory distress  [No Accessory Muscle Use] : no accessory muscle use [Coordination Grossly Intact] : coordination grossly intact [Normal Mood] : the mood was normal [de-identified] : craol complexion [de-identified] : dry oral mucosa [de-identified] : bibasilar creps [de-identified] : tachycardic

## 2022-05-13 NOTE — HISTORY OF PRESENT ILLNESS
[FreeTextEntry8] : Patient presents with ear pain, a sore throat. States "feels as if she is being strangled."  States she has not been sleeping since her surgery, she can't sleep on her stomach. She said while taking the antibiotic she felt better, but once she finished the prescription, she feels worse. Is wheezing. No energy to do daily activities, can not blow into her spirometer.  She has severe and persistent pain in both ears that radiates down both sides of her neck and into the collarbones.  She has a persistent hacking cough and is wheezing.  She hasn't been able to eat or drink well and feels much worse and very weak

## 2022-05-17 ENCOUNTER — RESULT REVIEW (OUTPATIENT)
Age: 77
End: 2022-05-17

## 2022-05-19 ENCOUNTER — APPOINTMENT (OUTPATIENT)
Dept: PULMONOLOGY | Facility: CLINIC | Age: 77
End: 2022-05-19

## 2022-05-19 ENCOUNTER — NON-APPOINTMENT (OUTPATIENT)
Age: 77
End: 2022-05-19

## 2022-05-19 RX ORDER — DICLOFENAC EPOLAMINE 0.01 G/1
1.3 SYSTEM TOPICAL TWICE DAILY
Qty: 60 | Refills: 0 | Status: DISCONTINUED | COMMUNITY
Start: 2022-04-18 | End: 2022-05-19

## 2022-05-19 RX ORDER — LORATADINE 10 MG
17 TABLET,DISINTEGRATING ORAL DAILY
Qty: 1 | Refills: 1 | Status: DISCONTINUED | COMMUNITY
End: 2022-05-19

## 2022-05-19 RX ORDER — BIOTIN 10 MG
10 TABLET ORAL DAILY
Refills: 0 | Status: DISCONTINUED | COMMUNITY
Start: 2022-03-24 | End: 2022-05-19

## 2022-05-19 RX ORDER — CHLORHEXIDINE GLUCONATE 4 %
5 LIQUID (ML) TOPICAL
Refills: 0 | Status: DISCONTINUED | COMMUNITY
Start: 2022-03-24 | End: 2022-05-19

## 2022-05-19 RX ORDER — ESOMEPRAZOLE MAGNESIUM 20 MG/1
20 CAPSULE, DELAYED RELEASE ORAL
Refills: 0 | Status: DISCONTINUED | COMMUNITY
End: 2022-05-19

## 2022-05-19 RX ORDER — SIMETHICONE 40MG/0.6ML
400-400-40 SUSPENSION, DROPS(FINAL DOSAGE FORM)(ML) ORAL
Refills: 0 | Status: DISCONTINUED | COMMUNITY
Start: 2022-04-06 | End: 2022-05-19

## 2022-05-19 RX ORDER — MULTIVITAMIN
TABLET ORAL
Refills: 0 | Status: DISCONTINUED | COMMUNITY
End: 2022-05-19

## 2022-05-23 ENCOUNTER — NON-APPOINTMENT (OUTPATIENT)
Age: 77
End: 2022-05-23

## 2022-05-23 ENCOUNTER — APPOINTMENT (OUTPATIENT)
Dept: CARE COORDINATION | Facility: HOME HEALTH | Age: 77
End: 2022-05-23
Payer: MEDICARE

## 2022-05-23 PROCEDURE — 99348 HOME/RES VST EST LOW MDM 30: CPT

## 2022-05-24 ENCOUNTER — APPOINTMENT (OUTPATIENT)
Dept: PULMONOLOGY | Facility: CLINIC | Age: 77
End: 2022-05-24
Payer: MEDICARE

## 2022-05-24 VITALS
TEMPERATURE: 98 F | WEIGHT: 187 LBS | SYSTOLIC BLOOD PRESSURE: 139 MMHG | BODY MASS INDEX: 30.05 KG/M2 | OXYGEN SATURATION: 99 % | HEIGHT: 66 IN | DIASTOLIC BLOOD PRESSURE: 79 MMHG | HEART RATE: 102 BPM

## 2022-05-24 DIAGNOSIS — R91.8 OTHER NONSPECIFIC ABNORMAL FINDING OF LUNG FIELD: ICD-10-CM

## 2022-05-24 PROCEDURE — 99214 OFFICE O/P EST MOD 30 MIN: CPT

## 2022-05-24 NOTE — HISTORY OF PRESENT ILLNESS
[Former] : former [Never] : never [TextBox_4] : 77 female sp cardiac surgery at [facility] and 3 weeks later icn dyspnea and bilateral effusion and pericarditis/Dressler\par effusion removed and exudate  TP 4.5 ldh <100\par co sl tightness in chest   occ  dyspnea  no wheeze \par occ coarse bs \par  [TextBox_11] : .1 [TextBox_13] : 10 [YearQuit] : 1974

## 2022-05-24 NOTE — REASON FOR VISIT
[Follow-Up] : a follow-up visit [Chest Pain] : chest pain [Cough] : cough [Pulmonary Nodules] : pulmonary nodules [Wheezing] : wheezing [TextBox_44] : 11 months, U 5/13-5/18/22 [place]

## 2022-05-24 NOTE — DISCUSSION/SUMMARY
[FreeTextEntry1] : Ms. Dubois recently was in the hospital after open heart surgery.  She developed pericarditis and pleural effusions.  900 cc was removed from the left thorax with total protein of 4.5 consistent with exudate.  It was likely from the trauma of the surgery.  Also pericarditis can cause pleural effusions.  The patient is on colchicine and Motrin and is improving.  I have asked her to follow-up chest x-ray in approxi-1 month's time which will be approximately 6 weeks from the time of hospitalization.  I will obtain cytology and culture results of the fluid from the laboratory.  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  The patient has nodules on a CAT scan of the chest for which we are going to follow-up CAT scan in 2 years time from the last visit June 2021 so it will be 1 years time from now.  There is all been thoroughly discussed with the patient and her  they understand and agree.\par The patient understands and agrees with plan of care.\par Today's office visit encompassed 32 minutes. I conducted an extensive history ,physical exam and reviewed diagnosis and treatment options  including diagnostic tests,radiologic studies including  cat scans  and the use of prescription medication.

## 2022-05-24 NOTE — PHYSICAL EXAM
[No Acute Distress] : no acute distress [Normal Oropharynx] : normal oropharynx [Normal Appearance] : normal appearance [No Neck Mass] : no neck mass [Normal Rate/Rhythm] : normal rate/rhythm [Normal S1, S2] : normal s1, s2 [No Murmurs] : no murmurs [No Resp Distress] : no resp distress [Clear to Auscultation Bilaterally] : clear to auscultation bilaterally [No Abnormalities] : no abnormalities [Benign] : benign [Normal Gait] : normal gait [No Clubbing] : no clubbing [No Cyanosis] : no cyanosis [No Edema] : no edema [FROM] : FROM [Normal Color/ Pigmentation] : normal color/ pigmentation [No Focal Deficits] : no focal deficits [Oriented x3] : oriented x3 [Normal Affect] : normal affect [TextBox_68] : Minimal decrease in breath sounds left base

## 2022-05-25 ENCOUNTER — APPOINTMENT (OUTPATIENT)
Dept: FAMILY MEDICINE | Facility: CLINIC | Age: 77
End: 2022-05-25
Payer: MEDICARE

## 2022-05-25 VITALS
WEIGHT: 187 LBS | SYSTOLIC BLOOD PRESSURE: 124 MMHG | HEIGHT: 66 IN | HEART RATE: 99 BPM | RESPIRATION RATE: 14 BRPM | OXYGEN SATURATION: 98 % | BODY MASS INDEX: 30.05 KG/M2 | DIASTOLIC BLOOD PRESSURE: 82 MMHG | TEMPERATURE: 98.2 F

## 2022-05-25 VITALS — DIASTOLIC BLOOD PRESSURE: 60 MMHG | SYSTOLIC BLOOD PRESSURE: 120 MMHG

## 2022-05-25 DIAGNOSIS — Z87.898 PERSONAL HISTORY OF OTHER SPECIFIED CONDITIONS: ICD-10-CM

## 2022-05-25 DIAGNOSIS — Z87.09 PERSONAL HISTORY OF OTHER DISEASES OF THE RESPIRATORY SYSTEM: ICD-10-CM

## 2022-05-25 PROCEDURE — 99496 TRANSJ CARE MGMT HIGH F2F 7D: CPT

## 2022-05-25 NOTE — HISTORY OF PRESENT ILLNESS
[Post-hospitalization from ___ Hospital] : Post-hospitalization from [unfilled] Hospital [Admitted on: ___] : The patient was admitted on [unfilled] [Discharged on ___] : discharged on [unfilled] [Discharge Summary] : discharge summary [Pertinent Labs] : pertinent labs [Radiology Findings] : radiology findings [Med Reconciliation] : medication reconciliation has been completed [Patient Contacted By: ____] : and contacted by [unfilled] [FreeTextEntry2] : Patient went to the [facility] after last visit 5/13 and had fluid in her lungs and pericardium.  She was diagnosed with Dressler's Syndrome and had 900 cc of fluid drained from the left lung.  She still has pressure in the chest and some discomfort in the neck and sometimes feels short or breath.  She is checking her pulse ox at home and it has been normal.  She is anxious about her health and is taking the diazepam, 1/2 tabs, 2-3 times a day.  She is taking the medications prescribed upon discharge.  She saw Dr Sylvester yesterday and will see Dr Hurtado next week.  She has to schedule the rheumatology appointment

## 2022-05-25 NOTE — PHYSICAL EXAM
[No Acute Distress] : no acute distress [Well Nourished] : well nourished [Well Developed] : well developed [Well-Appearing] : well-appearing [No Respiratory Distress] : no respiratory distress  [No Accessory Muscle Use] : no accessory muscle use [Normal Rate] : normal rate  [Regular Rhythm] : with a regular rhythm [Normal S1, S2] : normal S1 and S2 [de-identified] : decreased lung sounds at left base [de-identified] : she is tearful and weepy at times

## 2022-05-25 NOTE — PLAN
[FreeTextEntry1] : the discharge summary was reviewed, she will continue current medications and the weaning schedule for the ibuprofen, she will follow up with specialists as scheduled and was advised to call 911 for any worsening chest pressure or shortness of breath or for persistent pulse ox readings of 93 or less

## 2022-05-25 NOTE — REVIEW OF SYSTEMS
[Fatigue] : fatigue [Chest Pain] : chest pain [Shortness Of Breath] : shortness of breath [Suicidal] : not suicidal [Insomnia] : insomnia [Anxiety] : anxiety [Depression] : depression [Negative] : Heme/Lymph

## 2022-05-26 ENCOUNTER — APPOINTMENT (OUTPATIENT)
Dept: ORTHOPEDIC SURGERY | Facility: CLINIC | Age: 77
End: 2022-05-26

## 2022-05-26 VITALS
DIASTOLIC BLOOD PRESSURE: 80 MMHG | SYSTOLIC BLOOD PRESSURE: 130 MMHG | RESPIRATION RATE: 16 BRPM | HEART RATE: 88 BPM | OXYGEN SATURATION: 96 %

## 2022-05-26 NOTE — REVIEW OF SYSTEMS
[Anxiety] : anxiety [Depression] : depression [Negative] : Neurological [FreeTextEntry7] : occasional epigastric pain that comes and goes w/eating [de-identified] : situational

## 2022-05-26 NOTE — HISTORY OF PRESENT ILLNESS
[Post-hospitalization from ___ Hospital] : Post-hospitalization from [unfilled] Hospital [Admitted on: ___] : The patient was admitted on [unfilled] [FreeTextEntry2] : Copied from EMR, "HOSPITAL COURSE: During the hospital stay, pt was managed for the following conditions\par #Suspected Gram-franky associated PNA\par At the time of admission Pt has SIRS criteria positive (high wbc and HR). WBC was high but BCX negative.CT Chest showed B/L pleural effusions worse on the left with subadjacent atelectasis. Pt was started on cefepime 2g q8hrs IV and IVF\par #Dressler syndrome:\par Initially Pt has high inflammatory markers including ESR, CRP and RF. She was seen by cardiologist and pulmonologist. Rheumatologist was also consulted who did not believe that Pt has an autoimmune cause for her symptoms bc of lack of joint involvement. She underwent TTE which showed mild pericarditis and an echogenic material in the pericardial space > Pt was started on colchicine 0.6mg bid PO which was later reduced to once daily. Pt was also started on ibuprofen 600mg cardio recommendations. Pt home medicine ASA 81mg once daily PO was continued. Her symptoms improved w/ the treatment. Pt to be discharged on ibuprofen 600 TID for 1 week, 400 TID for 1 week, 400 BID for 1 week then DC and colchicine 0.6mg twice daily\par # Pleural effusion\par CT chest showed Lt> Rt pleural effusion. Pt underwent IR based Lt thoracentesis w/ 900cc output on 5/17/22. Result of the pleural fluid revealed exudative effusion. Pt's respiratory status improved after the thoracentesis Pt is to follow up w/ the pulmonologist outpt for further evaluation and management plan.\par Other acute and chronic issues listed below that were treated or managed and now stable at discharge:\par #HTN (hypertension) managed w/ home medicine metoprolol 50mg bid PO\par #Anxiety: Pt's home medicine diazepam 5mg once daily was continued\par #GERD (gastroesophageal reflux disease) was managed w/ Protonix 40mg once daily PO\par #HLD (hyperlipidemia) was managed w/ home medicine rosuvastatin 5mg once daily PO"\par \par Pt seen in her home today for transitional care management. She is fatigued, on and off since open heart surgery. \par She feels anxious about her situation and having to be hospitalized, pleural effusion drained. She suffers with chronic migraine HA's taking Fioricet daily, she is on Colchicine BID for Dressler's syndrome. She has occasional epigastric to left upper abd pain that comes and goes. It is exacerbated by eating at times. She is also on titrating doses of Ibuprofen 600mg TID down to BID with decreasing doses [id number]. Denies SOB, cough, fever, chills. \par

## 2022-05-26 NOTE — PHYSICAL EXAM
[No Acute Distress] : no acute distress [Well Nourished] : well nourished [Well Developed] : well developed [Well-Appearing] : well-appearing [Supple] : supple [No Respiratory Distress] : no respiratory distress  [Clear to Auscultation] : lungs were clear to auscultation bilaterally [No Accessory Muscle Use] : no accessory muscle use [Normal Rate] : normal rate  [Regular Rhythm] : with a regular rhythm [Normal S1, S2] : normal S1 and S2 [No Murmur] : no murmur heard [Pedal Pulses Present] : the pedal pulses are present [No Edema] : there was no peripheral edema [Soft] : abdomen soft [Non Tender] : non-tender [No HSM] : no HSM [Normal Gait] : normal gait [Coordination Grossly Intact] : coordination grossly intact [No Focal Deficits] : no focal deficits [Normal Affect] : the affect was normal [Alert and Oriented x3] : oriented to person, place, and time [Normal Mood] : the mood was normal

## 2022-05-26 NOTE — COUNSELING
[Fall prevention counseling provided] : Fall prevention counseling provided [Adequate lighting] : Adequate lighting [No throw rugs] : No throw rugs [Use proper foot wear] : Use proper foot wear [Behavioral health counseling provided] : Behavioral health counseling provided [Sleep ___ hours/day] : Sleep [unfilled] hours/day [Engage in a relaxing activity] : Engage in a relaxing activity [Patient motivation] : Patient motivation [Needs reinforcement, provided] : Patient needs reinforcement on understanding of lifestyle changes and steps needed to achieve self management goal; reinforcement was provided [de-identified] : pt declined Behavioral health referral-has supportive family and friends

## 2022-05-26 NOTE — ASSESSMENT
[FreeTextEntry1] : Ayla Dubois  is being seen after discharge home from [facility]. She was admitted on 5/15/22 for pleural effusion/Dressler's syndrome and discharged home on 5/18/22.  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 \par -pt hesitant to walk outside for fear of her neighbors coming too close to her\par Advised pt to continue doing her daily routine and focusing on the strides she is making. \par Encouraged to use I/S, deep breathing exercises and ambulate outdoors. It is appropriate for her to drive and walk  increasing her distance daily.\par Pt reports good support system w/family and close friends. She declined Behavioral Health referral. \par f/u with PCP for any recommendations for anti-depressant medication if pt is willing. \par \par Reminded of TCM program and encouraged pt to call with any questions or concerns and especially with worsening of symptoms. Verbalized understanding.\par \par

## 2022-05-28 ENCOUNTER — NON-APPOINTMENT (OUTPATIENT)
Age: 77
End: 2022-05-28

## 2022-06-02 ENCOUNTER — RX RENEWAL (OUTPATIENT)
Age: 77
End: 2022-06-02

## 2022-06-20 ENCOUNTER — RX RENEWAL (OUTPATIENT)
Age: 77
End: 2022-06-20

## 2022-06-22 ENCOUNTER — APPOINTMENT (OUTPATIENT)
Dept: OPHTHALMOLOGY | Facility: CLINIC | Age: 77
End: 2022-06-22
Payer: MEDICARE

## 2022-06-22 ENCOUNTER — NON-APPOINTMENT (OUTPATIENT)
Age: 77
End: 2022-06-22

## 2022-06-22 PROCEDURE — 92015 DETERMINE REFRACTIVE STATE: CPT

## 2022-06-22 PROCEDURE — 92014 COMPRE OPH EXAM EST PT 1/>: CPT

## 2022-06-22 PROCEDURE — 92134 CPTRZ OPH DX IMG PST SGM RTA: CPT

## 2022-06-27 ENCOUNTER — NON-APPOINTMENT (OUTPATIENT)
Age: 77
End: 2022-06-27

## 2022-06-30 ENCOUNTER — NON-APPOINTMENT (OUTPATIENT)
Age: 77
End: 2022-06-30

## 2022-07-01 ENCOUNTER — NON-APPOINTMENT (OUTPATIENT)
Age: 77
End: 2022-07-01

## 2022-07-13 PROBLEM — Z98.890 STATUS POST CARDIAC SURGERY: Status: ACTIVE | Noted: 2022-07-13

## 2022-07-14 ENCOUNTER — APPOINTMENT (OUTPATIENT)
Dept: CARDIOTHORACIC SURGERY | Facility: CLINIC | Age: 77
End: 2022-07-14

## 2022-07-14 VITALS
SYSTOLIC BLOOD PRESSURE: 137 MMHG | OXYGEN SATURATION: 98 % | HEART RATE: 87 BPM | RESPIRATION RATE: 16 BRPM | DIASTOLIC BLOOD PRESSURE: 87 MMHG | HEIGHT: 66 IN

## 2022-07-14 VITALS — TEMPERATURE: 98.6 F

## 2022-07-14 DIAGNOSIS — R73.09 OTHER ABNORMAL GLUCOSE: ICD-10-CM

## 2022-07-14 DIAGNOSIS — Z98.890 OTHER SPECIFIED POSTPROCEDURAL STATES: ICD-10-CM

## 2022-07-14 PROCEDURE — 99214 OFFICE O/P EST MOD 30 MIN: CPT

## 2022-07-14 RX ORDER — COLCHICINE 0.6 MG/1
0.6 TABLET ORAL TWICE DAILY
Qty: 30 | Refills: 0 | Status: COMPLETED | COMMUNITY
Start: 2022-05-19 | End: 2022-07-14

## 2022-07-14 RX ORDER — IBUPROFEN 200 MG/1
200 TABLET ORAL
Refills: 0 | Status: COMPLETED | COMMUNITY
Start: 2022-05-19 | End: 2022-07-14

## 2022-07-14 RX ORDER — METOPROLOL SUCCINATE 25 MG/1
25 TABLET, EXTENDED RELEASE ORAL TWICE DAILY
Qty: 180 | Refills: 3 | Status: COMPLETED | COMMUNITY
Start: 2022-04-18 | End: 2022-07-14

## 2022-07-14 RX ORDER — LORATADINE 5 MG
TABLET,CHEWABLE ORAL
Refills: 0 | Status: COMPLETED | COMMUNITY
End: 2022-07-14

## 2022-07-15 NOTE — END OF VISIT
[FreeTextEntry3] : \par I personally scribed for JEANIE ANDERSON on Jul 14 2022  2:45PM . \par \par \par \par \par Physician Attestation:\par Documented by SHARDA MAGANA acting as a scribe for JEANIE ANDERSON 07/14/2022 . \par                 All medical record entries made by the Scribe were at my, JEANIE ANDERSON , direction and personally dictated by me on 07/14/2022 . I have reviewed the chart and agree that the record accurately reflects my personal performance of the history, physical exam, assessment and plan\par \par

## 2022-07-15 NOTE — CONSULT LETTER
[Dear  ___] : Dear  [unfilled], [FreeTextEntry2] : Dr. Kiko Hurtado, \par [address]\[place] [id number] [FreeTextEntry1] : I had the pleasure of seeing your patient, AMADOU POSADAS, in my office today. \par \par We take a multidisciplinary team approach to patient care and consider you, the referring physician, an extension of our team. We will maintain an open line of communication with you throughout your patient's treatment course.  \par \par As you recall, she is a 77 year old female status post  Resection of RT atrial tumor on 4/1/22 .The patient presents to the office today for a routine follow up visit with repeat diagnostic imaging . I have enclosed a copy for your records.\par \par 5/31/22 TTE revealed LVEF 55 to 60%, Aneurysmal interatrial septum without evidence of shunt flow. Small pericardial effusion noted. RT atrial myxoma seen on previous study has since been removed. Newly noted small LT pleural effusion. Trace MR, Trace TR. \par \par Today on exam patient's lungs clear bilaterally, normal sinus rhythm, sternum stable, incision clean, dry and intact.  No peripheral edema noted. Therefore, I have recommended that the patient to follow up with Cardiologist and PCP. \par \par I appreciate the opportunity to care for your patient at [facility] . If there are any questions or concerns, please call me at [phone]. \par \par Please see my note below. \par \par Sincerely, \par \par \par \par \par Michael Wade MD\par Director\par [facility]\par Professor \par Cardiovascular & Thoracic Surgery\par [facility] [employer].\par \par \par [facility]:\par Department of Cardiovascular and Thoracic Surgery\[address], [address]\par Office: [phone]\par Fax: [phone]\par \par Sara Tobar\par  \[facility] Department of Cardiovascular and Thoracic Surgery\[address], [address]\par Phone: [phone]\par Office: [phone]\par \par

## 2022-07-15 NOTE — PHYSICAL EXAM
[Sclera] : the sclera and conjunctiva were normal [PERRL With Normal Accommodation] : pupils were equal in size, round, and reactive to light [Neck Appearance] : the appearance of the neck was normal [] : no respiratory distress [Respiration, Rhythm And Depth] : normal respiratory rhythm and effort [Auscultation Breath Sounds / Voice Sounds] : lungs were clear to auscultation bilaterally [Apical Impulse] : the apical impulse was normal [Heart Rate And Rhythm] : heart rate was normal and rhythm regular [Heart Sounds] : normal S1 and S2 [Murmurs] : no murmurs [Examination Of The Chest] : the chest was normal in appearance [2+] : left 2+ [Breast Appearance] : normal in appearance [Bowel Sounds] : normal bowel sounds [Abdomen Soft] : soft [No CVA Tenderness] : no ~M costovertebral angle tenderness [Abnormal Walk] : normal gait [Skin Color & Pigmentation] : normal skin color and pigmentation [Involuntary Movements] : no involuntary movements were seen [Skin Turgor] : normal skin turgor [No Focal Deficits] : no focal deficits [Impaired Insight] : insight and judgment were intact [Oriented To Time, Place, And Person] : oriented to person, place, and time [Affect] : the affect was normal [Mood] : the mood was normal [Memory Recent] : recent memory was not impaired [Memory Remote] : remote memory was not impaired [FreeTextEntry1] : Deferred

## 2022-07-15 NOTE — DATA REVIEWED
[FreeTextEntry1] : 5/31/22 TTE revealed LVEF 55 to 60%, Aneurysmal interatrial septum without evidence of shunt flow. Small pericardial effusion noted. RT atrial myxoma seen on previous study has since been removed. Newly noted small LT pleural effusion. Trace MR, Trace TR.

## 2022-07-15 NOTE — HISTORY OF PRESENT ILLNESS
[FreeTextEntry1] : Ms. 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 Pt with strong family hx of heart dz: grandmother  suddenly at age 65, brother  at 76 from [place], younger brother  from [place] age at 59, 3rd brother with CABG. \par \par She is S/P Resection of RT atrial tumor on 22. Post op course pertinent for migraines. Valium and Butalbital- acetaminophen resumed. She is here for follow up visit with TTE. \par \par \par This visit, she stated that she feels " like a electric feeling in the middle of the chest" and occasional shortness of breath . She reports that after surgery , she had some type of inflammatory reactions and she was admitted to [facility] in May 13 2022 and had LT thoracentesis and removed 900 cc fluid. She was started Colchicine and Ibuprofen which is weaned off now. She was treated with antibiotic for PNA. Denies any chest pain, palpitations, dizziness, shortness of breath or pedal edema. \par \par

## 2022-07-15 NOTE — ASSESSMENT
[FreeTextEntry1] : Ms. 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 Pt with strong family hx of heart dz: grandmother  suddenly at age 65, brother  at 76 from MI, younger brother  from [place] age at 59, 3rd brother with CABG. \par \par She is S/P Resection of RT atrial tumor on 22. Post op course pertinent for migraines. Valium and Butalbital- acetaminophen resumed. She is here for follow up visit with TTE. \par \par This visit, she stated that she feels " like a electric feeling in the middle of the chest" and occasional shortness of breath . She reports that after surgery , she had some type of inflammatory reactions and she was admitted to [facility] in May 13 2022 and had LT thoracentesis and removed 900 cc fluid. She was started Colchicine and Ibuprofen which is weaned off now. She was treated with antibiotic for PNA. Denies any chest pain, palpitations, dizziness, shortness of breath or pedal edema.  \par \par  reviewed the TTE and explained to the patient.22 TTE revealed LVEF 55 to 60%, Aneurysmal interatrial septum without evidence of shunt flow. Small pericardial effusion noted. RT atrial myxoma seen on previous study has since been removed. Newly noted small LT pleural effusion. Trace MR, Trace TR. \par \par Today on exam patient's lungs clear bilaterally, normal sinus rhythm, sternum stable, incision clean, dry and intact.  No peripheral edema noted. \par \par Plan:\par 1) Continue current medication regimen\par 2) Follow up with cardiologist ( Dr. Hurtado) and PCP \par 3) May return on as needed basis \par 4) Ambulate as tolerated \par 5) SBE antibiotic prophylaxis discussed at length \par 6) Continue to increase activity and walk daily as tolerated. \par \par \par

## 2022-07-20 ENCOUNTER — APPOINTMENT (OUTPATIENT)
Dept: FAMILY MEDICINE | Facility: CLINIC | Age: 77
End: 2022-07-20

## 2022-07-20 VITALS
OXYGEN SATURATION: 98 % | DIASTOLIC BLOOD PRESSURE: 72 MMHG | HEART RATE: 84 BPM | SYSTOLIC BLOOD PRESSURE: 120 MMHG | BODY MASS INDEX: 30.7 KG/M2 | TEMPERATURE: 98.3 F | RESPIRATION RATE: 16 BRPM | HEIGHT: 66 IN | WEIGHT: 191 LBS

## 2022-07-20 DIAGNOSIS — I24.1 DRESSLER'S SYNDROME: ICD-10-CM

## 2022-07-20 DIAGNOSIS — R06.09 OTHER FORMS OF DYSPNEA: ICD-10-CM

## 2022-07-20 DIAGNOSIS — Z87.09 PERSONAL HISTORY OF OTHER DISEASES OF THE RESPIRATORY SYSTEM: ICD-10-CM

## 2022-07-20 DIAGNOSIS — M54.2 CERVICALGIA: ICD-10-CM

## 2022-07-20 DIAGNOSIS — M79.89 OTHER SPECIFIED SOFT TISSUE DISORDERS: ICD-10-CM

## 2022-07-20 DIAGNOSIS — Z86.79 PERSONAL HISTORY OF OTHER DISEASES OF THE CIRCULATORY SYSTEM: ICD-10-CM

## 2022-07-20 DIAGNOSIS — Z95.1 DRESSLER'S SYNDROME: ICD-10-CM

## 2022-07-20 DIAGNOSIS — R05.9 COUGH, UNSPECIFIED: ICD-10-CM

## 2022-07-20 DIAGNOSIS — R07.89 OTHER CHEST PAIN: ICD-10-CM

## 2022-07-20 DIAGNOSIS — Z87.898 PERSONAL HISTORY OF OTHER SPECIFIED CONDITIONS: ICD-10-CM

## 2022-07-20 DIAGNOSIS — Z86.39 PERSONAL HISTORY OF OTHER ENDOCRINE, NUTRITIONAL AND METABOLIC DISEASE: ICD-10-CM

## 2022-07-20 PROCEDURE — 99214 OFFICE O/P EST MOD 30 MIN: CPT

## 2022-07-20 RX ORDER — DOXYCYCLINE 100 MG/1
100 TABLET, FILM COATED ORAL
Qty: 20 | Refills: 0 | Status: DISCONTINUED | COMMUNITY
Start: 2022-05-13

## 2022-07-20 RX ORDER — BENZONATATE 100 MG/1
100 CAPSULE ORAL
Qty: 30 | Refills: 1 | Status: DISCONTINUED | COMMUNITY
Start: 2022-05-11 | End: 2022-07-20

## 2022-07-20 RX ORDER — IBUPROFEN 600 MG/1
600 TABLET, FILM COATED ORAL
Qty: 60 | Refills: 0 | Status: DISCONTINUED | COMMUNITY
Start: 2022-05-18

## 2022-07-20 NOTE — REVIEW OF SYSTEMS
[Fatigue] : fatigue [Lower Ext Edema] : lower extremity edema [Joint Pain] : joint pain [Joint Stiffness] : joint stiffness [Headache] : headache [Insomnia] : insomnia [Anxiety] : anxiety [Negative] : Heme/Lymph [Suicidal] : not suicidal [Depression] : no depression

## 2022-07-20 NOTE — PLAN
[FreeTextEntry1] : her conditions are stable and improved, the lab report including A1C, CBC, CMP, B12, folate was reviewed, no medication changes were made, the recent cardiology, cardiothoracic surgery notes were reviewed and she will follow up in 6 months or sooner prn

## 2022-07-20 NOTE — HISTORY OF PRESENT ILLNESS
[FreeTextEntry1] : Patient presents for 3 month follow up. [de-identified] : She reports that she is slowly getting her strength back and feeling stronger.  She still has some swelling of the left foot and ankle and takes furosemide when needed.  She saw Dr Wade and was discharged from his care and she saw Dr Sylvester and will have a follow up chest CT next week.  She saw Dr Duffy in May and has an upcoming appointment.

## 2022-07-20 NOTE — PHYSICAL EXAM
[No Acute Distress] : no acute distress [Well Nourished] : well nourished [Well Developed] : well developed [Well-Appearing] : well-appearing [Normal Voice/Communication] : normal voice/communication [No Respiratory Distress] : no respiratory distress  [No Accessory Muscle Use] : no accessory muscle use [Clear to Auscultation] : lungs were clear to auscultation bilaterally [Normal Rate] : normal rate  [Regular Rhythm] : with a regular rhythm [Coordination Grossly Intact] : coordination grossly intact [Normal Mood] : the mood was normal [de-identified] : nonpitting edema of left foot and ankle

## 2022-07-29 ENCOUNTER — NON-APPOINTMENT (OUTPATIENT)
Age: 77
End: 2022-07-29

## 2022-08-01 ENCOUNTER — RX RENEWAL (OUTPATIENT)
Age: 77
End: 2022-08-01

## 2022-08-01 ENCOUNTER — NON-APPOINTMENT (OUTPATIENT)
Age: 77
End: 2022-08-01

## 2022-08-04 ENCOUNTER — APPOINTMENT (OUTPATIENT)
Dept: PULMONOLOGY | Facility: CLINIC | Age: 77
End: 2022-08-04

## 2022-10-05 ENCOUNTER — APPOINTMENT (OUTPATIENT)
Dept: FAMILY MEDICINE | Facility: CLINIC | Age: 77
End: 2022-10-05

## 2022-10-05 PROCEDURE — 99441: CPT | Mod: CS,95

## 2022-10-05 NOTE — PLAN
[FreeTextEntry1] : the risks, benefits and side effects of Paxlovid were reviewed and she agreed to start treatment, she will hold the rosuvastatin for 10 days and decrease the dose of diazepam by 50% and we reviewed the isolation protocol, she will follow up ASAP for any fever or worsening symptoms

## 2022-10-05 NOTE — HISTORY OF PRESENT ILLNESS
[Home] : at home, [unfilled] , at the time of the visit. [Medical Office: ([facility])___] : at the medical office located in  [Verbal consent obtained from patient] : the patient, [unfilled] [FreeTextEntry8] : The patient telephoned and requested a call back to discuss their health.  The visit was performed over the telephone as the patient was unable to be seen in the office due to the COVID 19 pandemic.  She started with a scratchy throat 3 days ago and since then has had a cough and headache.  She hasn’t had a fever.  She had a positive home Covid swab today.\par

## 2022-10-06 ENCOUNTER — NON-APPOINTMENT (OUTPATIENT)
Age: 77
End: 2022-10-06

## 2022-10-06 ENCOUNTER — APPOINTMENT (OUTPATIENT)
Dept: FAMILY MEDICINE | Facility: CLINIC | Age: 77
End: 2022-10-06

## 2022-10-06 RX ORDER — NIRMATRELVIR AND RITONAVIR 300-100 MG
20 X 150 MG & KIT ORAL
Qty: 30 | Refills: 0 | Status: DISCONTINUED | COMMUNITY
Start: 2022-10-05 | End: 2022-10-06

## 2022-10-14 ENCOUNTER — APPOINTMENT (OUTPATIENT)
Dept: FAMILY MEDICINE | Facility: CLINIC | Age: 77
End: 2022-10-14

## 2022-10-14 VITALS
SYSTOLIC BLOOD PRESSURE: 110 MMHG | WEIGHT: 174.9 LBS | BODY MASS INDEX: 28.11 KG/M2 | OXYGEN SATURATION: 98 % | TEMPERATURE: 98 F | DIASTOLIC BLOOD PRESSURE: 82 MMHG | HEART RATE: 81 BPM | HEIGHT: 66 IN | RESPIRATION RATE: 15 BRPM

## 2022-10-14 PROCEDURE — 99213 OFFICE O/P EST LOW 20 MIN: CPT

## 2022-10-14 RX ORDER — METOPROLOL SUCCINATE 50 MG/1
50 TABLET, EXTENDED RELEASE ORAL
Qty: 90 | Refills: 0 | Status: DISCONTINUED | COMMUNITY
Start: 2022-05-19 | End: 2022-10-14

## 2022-10-14 NOTE — PHYSICAL EXAM
[No Acute Distress] : no acute distress [Well Nourished] : well nourished [Well Developed] : well developed [Well-Appearing] : well-appearing [No Lymphadenopathy] : no lymphadenopathy [Supple] : supple [No Respiratory Distress] : no respiratory distress  [No Accessory Muscle Use] : no accessory muscle use [Clear to Auscultation] : lungs were clear to auscultation bilaterally [Normal Rate] : normal rate  [Regular Rhythm] : with a regular rhythm [Normal S1, S2] : normal S1 and S2 [Coordination Grossly Intact] : coordination grossly intact [Normal Mood] : the mood was normal [de-identified] : TM's dull bilaterally

## 2022-10-14 NOTE — PLAN
[FreeTextEntry1] : she was advised that the congestion may linger for a while longer and to call for any discolored mucous or any other worsening symptoms, will change the metoprolol ER to 25 mg bid

## 2022-10-14 NOTE — HISTORY OF PRESENT ILLNESS
[FreeTextEntry8] : Patient presents for cough and sinus congestion states she tested positive for covid 10/10/2022 and as of today was negative would like her chest listened to. Also would like to discuss medications.  She has postnasal drip and her ears feel clogged.  She still has a barky cough.  The cough is dry.  Her nasal discharge is clear.  She would like to change the metoprolol to 25 mg bid as she sometimes gets palpitations if she wakes up during the night to urinate.  She is on Weight Watchers and has lost over 20 pounds.  She is taking less butalbital and hasn't been taking the diazepam

## 2022-10-14 NOTE — REVIEW OF SYSTEMS
[Fever] : no fever [Chills] : no chills [Recent Change In Weight] : ~T recent weight change [Nasal Discharge] : nasal discharge [Postnasal Drip] : postnasal drip [Shortness Of Breath] : no shortness of breath [Wheezing] : no wheezing [Cough] : cough [Headache] : headache [Negative] : Heme/Lymph [FreeTextEntry4] : ear congestion

## 2022-11-07 ENCOUNTER — RX RENEWAL (OUTPATIENT)
Age: 77
End: 2022-11-07

## 2023-01-04 ENCOUNTER — APPOINTMENT (OUTPATIENT)
Dept: OPHTHALMOLOGY | Facility: CLINIC | Age: 78
End: 2023-01-04
Payer: MEDICARE

## 2023-01-04 ENCOUNTER — NON-APPOINTMENT (OUTPATIENT)
Age: 78
End: 2023-01-04

## 2023-01-04 PROCEDURE — 92014 COMPRE OPH EXAM EST PT 1/>: CPT

## 2023-01-20 ENCOUNTER — APPOINTMENT (OUTPATIENT)
Dept: FAMILY MEDICINE | Facility: CLINIC | Age: 78
End: 2023-01-20
Payer: MEDICARE

## 2023-01-20 VITALS
RESPIRATION RATE: 14 BRPM | HEART RATE: 80 BPM | HEIGHT: 66 IN | OXYGEN SATURATION: 99 % | TEMPERATURE: 97 F | SYSTOLIC BLOOD PRESSURE: 112 MMHG | WEIGHT: 173 LBS | BODY MASS INDEX: 27.8 KG/M2 | DIASTOLIC BLOOD PRESSURE: 80 MMHG

## 2023-01-20 VITALS — SYSTOLIC BLOOD PRESSURE: 104 MMHG | DIASTOLIC BLOOD PRESSURE: 72 MMHG

## 2023-01-20 DIAGNOSIS — M51.36 OTHER INTERVERTEBRAL DISC DEGENERATION, LUMBAR REGION: ICD-10-CM

## 2023-01-20 DIAGNOSIS — Z09 ENCOUNTER FOR FOLLOW-UP EXAMINATION AFTER COMPLETED TREATMENT FOR CONDITIONS OTHER THAN MALIGNANT NEOPLASM: ICD-10-CM

## 2023-01-20 DIAGNOSIS — U07.1 COVID-19: ICD-10-CM

## 2023-01-20 PROCEDURE — 99214 OFFICE O/P EST MOD 30 MIN: CPT

## 2023-01-20 RX ORDER — METOPROLOL SUCCINATE 25 MG/1
25 TABLET, EXTENDED RELEASE ORAL TWICE DAILY
Refills: 0 | Status: DISCONTINUED | COMMUNITY
Start: 2022-10-14 | End: 2023-01-20

## 2023-01-20 NOTE — HISTORY OF PRESENT ILLNESS
[FreeTextEntry1] : Patient presents for a follow up on labs and medication renewal  [de-identified] : She has been weaning down on the Fioricet, she takes 2 to 2 and 1/2 tabs daily. She has had a fast heart beat since December.  She saw Dr Hurtado yesterday and he changed the metoprolol ER back to 37.5 mg bid.  The EKG was "fine".  She also saw Dr Stone in November for tingling in her feet in certain positions.  She had a MRI done which showed degenerative changes.  She ordered PT which she started last week.  She is also doing exercises at home. She ordered gabapentin but she hasn't started it yet.  She also had seeing "diamonds" in her eyes and saw the eye doctor who said her eyes were healthy.  She has been under a lot of stress with her brother.  She donated blood in November.  She would like to renew the butalbital without codeine today

## 2023-01-20 NOTE — PLAN
[FreeTextEntry1] : The MRI report that she brought was reviewed, she was encouraged to start the gabapentin which would help with her symptoms and also with her sleep.  The ophthalmology note was reviewed as were the lab reports from myself and Dr. Stone.  She was advised to continue the current dose of the metoprolol ER which is 37.5 mg twice daily, I stop was checked and the butalbital without caffeine was renewed.  Her conditions are stable and no medication changes were made at this time and she will follow-up in 6 months and recheck labs prior

## 2023-02-13 ENCOUNTER — RX RENEWAL (OUTPATIENT)
Age: 78
End: 2023-02-13

## 2023-03-06 ENCOUNTER — APPOINTMENT (OUTPATIENT)
Dept: FAMILY MEDICINE | Facility: CLINIC | Age: 78
End: 2023-03-06
Payer: MEDICARE

## 2023-03-06 VITALS
HEIGHT: 66 IN | RESPIRATION RATE: 14 BRPM | DIASTOLIC BLOOD PRESSURE: 60 MMHG | SYSTOLIC BLOOD PRESSURE: 110 MMHG | OXYGEN SATURATION: 98 % | HEART RATE: 70 BPM | WEIGHT: 173 LBS | BODY MASS INDEX: 27.8 KG/M2

## 2023-03-06 VITALS — TEMPERATURE: 98 F

## 2023-03-06 DIAGNOSIS — M17.11 UNILATERAL PRIMARY OSTEOARTHRITIS, RIGHT KNEE: ICD-10-CM

## 2023-03-06 PROCEDURE — 99213 OFFICE O/P EST LOW 20 MIN: CPT

## 2023-03-06 NOTE — ASSESSMENT
[Patient Optimized for Surgery] : Patient optimized for surgery [No Further Testing Recommended] : no further testing recommended [Modify anti-platelet treatment prior to procedure] : Modify anti-platelet treatment prior to procedure [Continue medications as is] : Continue current medications [As per surgery] : as per surgery [FreeTextEntry4] : she is medically cleared for surgery [FreeTextEntry6] : hold on the day of surgery

## 2023-03-06 NOTE — PHYSICAL EXAM
[No Acute Distress] : no acute distress [Well Nourished] : well nourished [Well Developed] : well developed [Well-Appearing] : well-appearing [Normal Voice/Communication] : normal voice/communication [Normal Sclera/Conjunctiva] : normal sclera/conjunctiva [Normal Outer Ear/Nose] : the outer ears and nose were normal in appearance [Normal TMs] : both tympanic membranes were normal [No Lymphadenopathy] : no lymphadenopathy [Supple] : supple [No Respiratory Distress] : no respiratory distress  [No Accessory Muscle Use] : no accessory muscle use [Clear to Auscultation] : lungs were clear to auscultation bilaterally [Normal Rate] : normal rate  [Regular Rhythm] : with a regular rhythm [Normal S1, S2] : normal S1 and S2 [Soft] : abdomen soft [Non Tender] : non-tender [Non-distended] : non-distended [Coordination Grossly Intact] : coordination grossly intact [Normal Mood] : the mood was normal [de-identified] : minimal edema of LLE

## 2023-03-06 NOTE — REVIEW OF SYSTEMS
[Joint Pain] : joint pain [Joint Stiffness] : joint stiffness [Headache] : headache [Suicidal] : not suicidal [Insomnia] : insomnia [Anxiety] : anxiety [Depression] : no depression [Negative] : Heme/Lymph

## 2023-03-06 NOTE — RESULTS/DATA
[] : results reviewed [de-identified] : elevated BUN, had taken furosemide the day prior [de-identified] : no change from prior EKG performed on 1/19/23

## 2023-03-06 NOTE — HISTORY OF PRESENT ILLNESS
[Coronary Artery Disease] : coronary artery disease [No Pertinent Pulmonary History] : no history of asthma, COPD, sleep apnea, or smoking [Self] : previous adverse anesthesia reaction [(Patient denies any chest pain, claudication, dyspnea on exertion, orthopnea, palpitations or syncope)] : Patient denies any chest pain, claudication, dyspnea on exertion, orthopnea, palpitations or syncope [Aortic Stenosis] : no aortic stenosis [Atrial Fibrillation] : no atrial fibrillation [Recent Myocardial Infarction] : no recent myocardial infarction [Implantable Device/Pacemaker] : no implantable device/pacemaker [Asthma] : no asthma [COPD] : no COPD [Sleep Apnea] : no sleep apnea [Smoker] : not a smoker [Chronic Anticoagulation] : no chronic anticoagulation [Chronic Kidney Disease] : no chronic kidney disease [Diabetes] : no diabetes [Anti-Platelet Agents: _____] : Anti-Platelet Agents: [unfilled] [FreeTextEntry1] : total right knee replacement  [FreeTextEntry2] : 03/14/2023 [FreeTextEntry3] : Dr. Sergio Zamora  [FreeTextEntry4] : patient  presents for medical clearance  [FreeTextEntry5] : she gets vomiting, headaches and chills from anesthesia

## 2023-03-20 ENCOUNTER — NON-APPOINTMENT (OUTPATIENT)
Age: 78
End: 2023-03-20

## 2023-03-24 RX ORDER — BUTALBITAL AND ACETAMINOPHEN 325; 50 MG/1; MG/1
50-325 TABLET ORAL
Qty: 100 | Refills: 0 | Status: ACTIVE | COMMUNITY
Start: 2022-03-14 | End: 1900-01-01

## 2023-04-11 ENCOUNTER — NON-APPOINTMENT (OUTPATIENT)
Age: 78
End: 2023-04-11

## 2023-04-11 ENCOUNTER — RX RENEWAL (OUTPATIENT)
Age: 78
End: 2023-04-11

## 2023-05-09 ENCOUNTER — RX RENEWAL (OUTPATIENT)
Age: 78
End: 2023-05-09

## 2023-05-09 RX ORDER — FUROSEMIDE 20 MG/1
20 TABLET ORAL
Qty: 90 | Refills: 3 | Status: ACTIVE | COMMUNITY
Start: 2022-05-28 | End: 1900-01-01

## 2023-06-08 ENCOUNTER — APPOINTMENT (OUTPATIENT)
Dept: PULMONOLOGY | Facility: CLINIC | Age: 78
End: 2023-06-08

## 2023-07-19 ENCOUNTER — APPOINTMENT (OUTPATIENT)
Dept: OPHTHALMOLOGY | Facility: CLINIC | Age: 78
End: 2023-07-19
Payer: SELF-PAY

## 2023-07-19 ENCOUNTER — NON-APPOINTMENT (OUTPATIENT)
Age: 78
End: 2023-07-19

## 2023-07-19 ENCOUNTER — APPOINTMENT (OUTPATIENT)
Dept: OPHTHALMOLOGY | Facility: CLINIC | Age: 78
End: 2023-07-19
Payer: MEDICARE

## 2023-07-19 PROCEDURE — 92015 DETERMINE REFRACTIVE STATE: CPT

## 2023-07-19 PROCEDURE — 92134 CPTRZ OPH DX IMG PST SGM RTA: CPT

## 2023-07-19 PROCEDURE — 92014 COMPRE OPH EXAM EST PT 1/>: CPT

## 2023-07-20 ENCOUNTER — APPOINTMENT (OUTPATIENT)
Dept: FAMILY MEDICINE | Facility: CLINIC | Age: 78
End: 2023-07-20

## 2023-07-20 ENCOUNTER — APPOINTMENT (OUTPATIENT)
Dept: FAMILY MEDICINE | Facility: CLINIC | Age: 78
End: 2023-07-20
Payer: MEDICARE

## 2023-07-20 VITALS
WEIGHT: 181 LBS | SYSTOLIC BLOOD PRESSURE: 112 MMHG | DIASTOLIC BLOOD PRESSURE: 70 MMHG | HEIGHT: 66 IN | BODY MASS INDEX: 29.09 KG/M2 | OXYGEN SATURATION: 96 % | TEMPERATURE: 98.9 F | RESPIRATION RATE: 14 BRPM | HEART RATE: 73 BPM

## 2023-07-20 VITALS — DIASTOLIC BLOOD PRESSURE: 60 MMHG | SYSTOLIC BLOOD PRESSURE: 108 MMHG

## 2023-07-20 DIAGNOSIS — G43.909 MIGRAINE, UNSPECIFIED, NOT INTRACTABLE, W/OUT STATUS MIGRAINOSUS: ICD-10-CM

## 2023-07-20 DIAGNOSIS — Z96.651 PRESENCE OF RIGHT ARTIFICIAL KNEE JOINT: ICD-10-CM

## 2023-07-20 DIAGNOSIS — R10.9 UNSPECIFIED ABDOMINAL PAIN: ICD-10-CM

## 2023-07-20 PROCEDURE — 99214 OFFICE O/P EST MOD 30 MIN: CPT

## 2023-07-20 NOTE — REVIEW OF SYSTEMS
[Lower Ext Edema] : lower extremity edema [Joint Stiffness] : joint stiffness [Headache] : headache [Insomnia] : insomnia [Anxiety] : anxiety [Negative] : Heme/Lymph [Suicidal] : not suicidal [Depression] : no depression

## 2023-07-20 NOTE — HISTORY OF PRESENT ILLNESS
[FreeTextEntry1] : patient presents for blood pressure check  [de-identified] : It took a while to recover from the right knee replacement surgery.  She just donated blood about 10 days ago and her hemoglobin was 12.6. She needs a note allowing her to donate blood. She has swelling in the left ankle on and off and takes furosemide as needed.  She is taking 200 mg of gabapentin at HS.  It has caused some visual changes and an increased appetite and a constant dull headache.  She tried to go off of it but the nerve pain worsened.  She had been down to 2 of the Fioricet with caffeine daily but is now needing 4 daily.  She would like to renew it today and needs diazepam also.

## 2023-07-20 NOTE — PLAN
----- Message from NICK Rendon MD sent at 2/18/2022  3:50 PM CST -----  Urine culture is negative   [FreeTextEntry1] : Lab report including CBC, CMP, lipid profile, A1c and TSH was reviewed with her.  The anemia is likely due to the recent blood donation as she was not anemic prior.  She was provided with a letter stating that she is medically stable to donate blood today.  For the stable hypertension she will continue taking metoprolol succinate ER 37.5 mg twice daily and for the stable migraines she will continue taking the butalbital with caffeine 50/325/40 every 4 hours as needed.  For the stable anxiety she will continue taking diazepam 5 mg 3 times a day as needed.  I-STOP was checked and both prescriptions were renewed today and she will follow-up in 6 months and check labs prior or sooner if needed.

## 2023-07-20 NOTE — PHYSICAL EXAM
[No Acute Distress] : no acute distress [Well Nourished] : well nourished [Well Developed] : well developed [Well-Appearing] : well-appearing [Normal Voice/Communication] : normal voice/communication [No Respiratory Distress] : no respiratory distress  [No Accessory Muscle Use] : no accessory muscle use [Clear to Auscultation] : lungs were clear to auscultation bilaterally [Normal Rate] : normal rate  [Regular Rhythm] : with a regular rhythm [Normal S1, S2] : normal S1 and S2 [No Edema] : there was no peripheral edema [Normal Mood] : the mood was normal [de-identified] : prominent veins on LE

## 2023-08-03 ENCOUNTER — NON-APPOINTMENT (OUTPATIENT)
Age: 78
End: 2023-08-03

## 2023-08-03 ENCOUNTER — APPOINTMENT (OUTPATIENT)
Dept: OPHTHALMOLOGY | Facility: CLINIC | Age: 78
End: 2023-08-03
Payer: SELF-PAY

## 2023-08-03 PROCEDURE — 92015 DETERMINE REFRACTIVE STATE: CPT

## 2023-12-01 DIAGNOSIS — R42 DIZZINESS AND GIDDINESS: ICD-10-CM

## 2023-12-01 RX ORDER — MECLIZINE HYDROCHLORIDE 25 MG/1
25 TABLET ORAL 4 TIMES DAILY
Qty: 40 | Refills: 2 | Status: ACTIVE | COMMUNITY
Start: 2023-12-01 | End: 1900-01-01

## 2023-12-20 ENCOUNTER — NON-APPOINTMENT (OUTPATIENT)
Age: 78
End: 2023-12-20

## 2024-01-23 ENCOUNTER — APPOINTMENT (OUTPATIENT)
Dept: FAMILY MEDICINE | Facility: CLINIC | Age: 79
End: 2024-01-23
Payer: MEDICARE

## 2024-01-23 VITALS
HEART RATE: 68 BPM | BODY MASS INDEX: 28.06 KG/M2 | DIASTOLIC BLOOD PRESSURE: 68 MMHG | WEIGHT: 174.6 LBS | OXYGEN SATURATION: 95 % | HEIGHT: 66 IN | TEMPERATURE: 98.1 F | SYSTOLIC BLOOD PRESSURE: 102 MMHG | RESPIRATION RATE: 14 BRPM

## 2024-01-23 DIAGNOSIS — R00.2 PALPITATIONS: ICD-10-CM

## 2024-01-23 DIAGNOSIS — F41.9 ANXIETY DISORDER, UNSPECIFIED: ICD-10-CM

## 2024-01-23 DIAGNOSIS — G44.221 CHRONIC TENSION-TYPE HEADACHE, INTRACTABLE: ICD-10-CM

## 2024-01-23 DIAGNOSIS — Z23 ENCOUNTER FOR IMMUNIZATION: ICD-10-CM

## 2024-01-23 DIAGNOSIS — I10 ESSENTIAL (PRIMARY) HYPERTENSION: ICD-10-CM

## 2024-01-23 DIAGNOSIS — E78.00 PURE HYPERCHOLESTEROLEMIA, UNSPECIFIED: ICD-10-CM

## 2024-01-23 LAB — CYTOLOGY CVX/VAG DOC THIN PREP: NORMAL

## 2024-01-23 PROCEDURE — G2211 COMPLEX E/M VISIT ADD ON: CPT

## 2024-01-23 PROCEDURE — 99214 OFFICE O/P EST MOD 30 MIN: CPT

## 2024-01-23 RX ORDER — METOPROLOL SUCCINATE 25 MG/1
25 TABLET, EXTENDED RELEASE ORAL
Refills: 0 | Status: ACTIVE | COMMUNITY

## 2024-01-23 RX ORDER — GABAPENTIN 100 MG/1
100 CAPSULE ORAL AT BEDTIME
Refills: 0 | Status: DISCONTINUED | COMMUNITY
End: 2024-01-23

## 2024-01-23 NOTE — REVIEW OF SYSTEMS
[Palpitations] : palpitations [Joint Pain] : joint pain [Joint Stiffness] : joint stiffness [Back Pain] : back pain [Anxiety] : anxiety [Negative] : Heme/Lymph

## 2024-01-23 NOTE — PHYSICAL EXAM
[No Acute Distress] : no acute distress [Well Nourished] : well nourished [Well Developed] : well developed [Well-Appearing] : well-appearing [No Respiratory Distress] : no respiratory distress  [No Accessory Muscle Use] : no accessory muscle use [Clear to Auscultation] : lungs were clear to auscultation bilaterally [Normal Rate] : normal rate  [Normal S1, S2] : normal S1 and S2 [Regular Rhythm] : with a regular rhythm [de-identified] : she is upset and tearful

## 2024-01-23 NOTE — HISTORY OF PRESENT ILLNESS
[FreeTextEntry1] : Patient presents for 6 month med check [de-identified] : She has been under a lot of stress, her step granddaughter committed suicide last summer and her brother has been ill.  She has been having racing of her heart at night.  She saw Dr Hurtado who increased the metoprolol to 50 mg at night.  She still feels the racing.  She is off of gabapentin.  Dr Stone prescribed duloxetine but she didn't start it.  She is still having pain in the thigh and also pain radiating down the leg.

## 2024-01-23 NOTE — PLAN
[FreeTextEntry1] : She requested new handicap parking permit form it was completed today.  The potential side effects of duloxetine were reviewed with her and she does not want to start it and she was advised to consider seeing pain management for further treatment of her symptoms.  The ophthalmology and cardiology notes were reviewed as well as the lab report including CBC, CMP, lipid panel, TSH.  For the palpitations she will continue taking metoprolol ER 37.5 mg in the morning and 50 mg in the evening and for the stable anxiety she will continue taking diazepam 5 mg 3 times a day as needed and for the stable headache she will continue alternating butalbital with acetaminophen 50/325 every 4-6 hours with butalbital/acetaminophen/caffeine 50/325/44 times a day as needed and she will continue taking rosuvastatin 5 mg daily for the stable hypercholesterolemia.  She was given a note stating she is medically stable to donate blood and she will follow-up in 6 months and recheck labs prior, see orders for details.

## 2024-03-21 ENCOUNTER — APPOINTMENT (OUTPATIENT)
Dept: ORTHOPEDIC SURGERY | Facility: CLINIC | Age: 79
End: 2024-03-21
Payer: MEDICARE

## 2024-03-21 VITALS — WEIGHT: 178 LBS | HEIGHT: 66 IN | BODY MASS INDEX: 28.61 KG/M2

## 2024-03-21 PROCEDURE — 99214 OFFICE O/P EST MOD 30 MIN: CPT

## 2024-03-21 PROCEDURE — 99204 OFFICE O/P NEW MOD 45 MIN: CPT

## 2024-03-21 PROCEDURE — 73502 X-RAY EXAM HIP UNI 2-3 VIEWS: CPT

## 2024-03-21 NOTE — DISCUSSION/SUMMARY
[de-identified] : Lateral movement of femoral stem discussed Revision surgery.  and possibillity of the pain she is having may not be relieved.   MRI  to eval prosthesis Further   Discussed importance of non-impact exercise and muscle stretching before and after exercise. Reviewed x-rays Explained the importance of ice and rest.    Stretching exercises shown,   F/U After MRI

## 2024-03-21 NOTE — PHYSICAL EXAM
[Left] : left hip [5___] : adduction 5[unfilled]/5 [All Views] : anteroposterior, lateral [] : no erythema [FreeTextEntry8] : AD-ductors  tender,  Femur Nontender on exam [de-identified] : [id number] sensation stocking distribution to knee [FreeTextEntry9] : lateral migration of femoral stem   with adaptive   bone involvement of the lateral cortex prox and distal loosening of cement

## 2024-03-21 NOTE — REASON FOR VISIT
[FreeTextEntry2] : here for left hip/thigh and lower back pain.  s/p left DARREN 4/ 2018 with revision 6/2018.c/o pain 8/10 with activity ad 2/10 with rest.  no nsaids d/t stomach distress.  has been to PT on and off since 2018.  c/o pain wit standing/walking/exerise and sleeping Sit Stand pain and stiffness anteriorly and laterally at groin over last 6 months getting worse pain going down back and front of leg last 3 months Natalee Gonzalez back in 2018 nerve pain no improvement 2023 gabapentin from neurologist no help no longer taking since 8/2023   Did not  help and stopped after  6-8 months

## 2024-03-23 ENCOUNTER — RESULT REVIEW (OUTPATIENT)
Age: 79
End: 2024-03-23

## 2024-03-26 ENCOUNTER — APPOINTMENT (OUTPATIENT)
Dept: ORTHOPEDIC SURGERY | Facility: CLINIC | Age: 79
End: 2024-03-26
Payer: MEDICARE

## 2024-03-26 ENCOUNTER — NON-APPOINTMENT (OUTPATIENT)
Age: 79
End: 2024-03-26

## 2024-03-26 PROCEDURE — 99213 OFFICE O/P EST LOW 20 MIN: CPT

## 2024-03-26 RX ORDER — DIAZEPAM 5 MG/1
5 TABLET ORAL 3 TIMES DAILY
Qty: 90 | Refills: 0 | Status: ACTIVE | COMMUNITY
Start: 2022-04-08 | End: 1900-01-01

## 2024-03-26 NOTE — DATA REVIEWED
[MRI] : MRI [Left] : left [Hip] : hip [Report was reviewed and noted in the chart] : The report was reviewed and noted in the chart [I independently reviewed and interpreted images and report] : I independently reviewed and interpreted images and report

## 2024-03-29 NOTE — PHYSICAL EXAM
[Left] : left hip [5___] : adduction 5[unfilled]/5 [All Views] : anteroposterior, lateral [] : no erythema [FreeTextEntry8] : AD-ductors  tender,  Femur Nontender on exam [de-identified] : [id number] sensation stocking distribution to knee [FreeTextEntry9] : lateral migration of femoral stem   with adaptive   bone involvement of the lateral cortex prox and distal loosening of cement

## 2024-03-29 NOTE — REASON FOR VISIT
[FreeTextEntry2] : Here for left hip MRI Results at .  s/p left DARREN 4/ 2018 with revision 6/2018. c/o pain 8-9/10 with activity ad 0/10 with rest.  no NSAIDS d/t stomach distress.   Patient has had Multiple Trigger point and Epidural in in the past with  Dr Martinez. C/O Gastric distress from anesthesia.

## 2024-03-29 NOTE — DISCUSSION/SUMMARY
[de-identified] : Lateral movement of femoral stem, Discussed Revision surgery at length, patient thinking she needs to have second opinion [facility]  offered MD's  names on [place] and  [facility]  Also discussed possibility of the pain in groin and sciatic/back pain she is having may not be relieved.    MRI : Left hip arthroplasty. Trace left hip joint effusion which communicates with a moderate effusion in the left trochanteric bursa.  Mild osteoarthritis in the contralateral (right) hip.  Moderate L4-L5 spondylosis.

## 2024-04-15 ENCOUNTER — TRANSCRIPTION ENCOUNTER (OUTPATIENT)
Age: 79
End: 2024-04-15

## 2024-04-29 ENCOUNTER — APPOINTMENT (OUTPATIENT)
Dept: ORTHOPEDIC SURGERY | Facility: CLINIC | Age: 79
End: 2024-04-29
Payer: MEDICARE

## 2024-04-29 VITALS — BODY MASS INDEX: 28.61 KG/M2 | WEIGHT: 178 LBS | HEIGHT: 66 IN

## 2024-04-29 DIAGNOSIS — T84.038A MECHANICAL LOOSENING OF OTHER INTERNAL PROSTHETIC JOINT, INITIAL ENCOUNTER: ICD-10-CM

## 2024-04-29 DIAGNOSIS — Z96.649 MECHANICAL LOOSENING OF OTHER INTERNAL PROSTHETIC JOINT, INITIAL ENCOUNTER: ICD-10-CM

## 2024-04-29 PROCEDURE — 99215 OFFICE O/P EST HI 40 MIN: CPT

## 2024-04-29 NOTE — HISTORY OF PRESENT ILLNESS
[de-identified] : 4/29/24: 79F with LT hip pain x 3 months. No specific injury/trauma. Hx of DARREN press fit in 2018 that subsided- then revised in June 2018 to a cemented stem by Dr. Grullon. Did have relief after the revision but still had persistent thigh pain. Saw Dr. Grullon in March and was told there was lateral movement of femoral stem and needed revision.  Most pain in the groin, anterior thigh, and laterally with sit to stand. Sig startup pain- settles once she starts walking. Difficulty with stairs. Hx of R TKA in March 2023- states it is doing okay, does have stiffness and aches. Ices the knee frequently. Ambulates without assistance. Reports a sig amount of stress in her personal life in the past year.   Hx of open heart surgery in 2022   [] : no [FreeTextEntry1] : left hip  [FreeTextEntry5] : Patient is here for evaluation on the LEFT hip. No injury. Pain started 3 months ago. Pain is lateral and groin area. Saw Edith 03/21/24.  RT TKA 03/2023 h/o of LT DARREN 04/2018 revision in 06/18 [de-identified] : 2024 [de-identified] : 2018 [de-identified] : xr, mri

## 2024-04-29 NOTE — DISCUSSION/SUMMARY
[de-identified] : The patient was advised of the diagnosis.  The natural history of the pathology was explained in full to the patient in layman's terms. All questions were answered.  The risks and benefits of surgical and non-surgical treatment alternatives were explained in full to the patient.  We had an extensive discussion regarding surgical intervention including risk, benefits and alternatives.  The risks include, but are not limited to infection, bleeding, injury to small nerves and blood vessels, pain, stiffness, phlebitis, DVT malunion, nonunion, and need for secondary procedures.  Preoperative, intraoperative and postoperative care were discussed and outlined to the patient as well.  The patient has had an opportunity to ask any question and all were answered to the best of my ability.  Entered by Christine Philippe acting as a scribe.

## 2024-04-29 NOTE — ASSESSMENT
[FreeTextEntry1] : 4/29/24: Pt with hx of L DARREN rev in 2018 by Dr. 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 Discussed her best and only option is revision- explained the more surgery is delayed- she is at increased risk of fx. however this is not an emergency surgery.  She is well informed and would like to proceed with L DARREN revision femoral component reclaim monoblock and modular, with RUDOLPH. Will obtain b/w to r/o infection. Answered all pt questions at length.  Patient does have anterior thigh pain that she is since before her primary surgery which is likely related to the stem and loosening but I cannot be 100% sure this could be lumbar disease as well.  I am hoping with the revision surgery this pain will alleviate but she understands that it may persist and if so we will need to work it up separately.  I am also going to better evaluate her psoas tendon and her cuff alignment to make sure that this is not the underlying cause

## 2024-04-29 NOTE — IMAGING
[de-identified] : RIGHT KNEE R TKA incision well healed ROM 0-120 mild effusion tenderness at the pes some mild global laxity  LEFT HIP lateral tenderness mild pain with ROM mostly laterally +2 pt pulses 5/5 strength  XR LT HIP showing L DARREN cemented collared stem with obvious loosening and some proximal femoral remodeling

## 2024-05-02 ENCOUNTER — NON-APPOINTMENT (OUTPATIENT)
Age: 79
End: 2024-05-02

## 2024-05-03 RX ORDER — BUTALBITAL, ACETAMINOPHEN AND CAFFEINE 325; 50; 40 MG/1; MG/1; MG/1
50-325-40 TABLET ORAL EVERY 4 HOURS
Qty: 540 | Refills: 0 | Status: ACTIVE | COMMUNITY
Start: 2022-07-08 | End: 1900-01-01

## 2024-05-07 ENCOUNTER — NON-APPOINTMENT (OUTPATIENT)
Age: 79
End: 2024-05-07

## 2024-05-11 ENCOUNTER — RX RENEWAL (OUTPATIENT)
Age: 79
End: 2024-05-11

## 2024-05-16 ENCOUNTER — APPOINTMENT (OUTPATIENT)
Dept: PULMONOLOGY | Facility: CLINIC | Age: 79
End: 2024-05-16

## 2024-05-17 ENCOUNTER — APPOINTMENT (OUTPATIENT)
Dept: PULMONOLOGY | Facility: CLINIC | Age: 79
End: 2024-05-17

## 2024-05-21 ENCOUNTER — APPOINTMENT (OUTPATIENT)
Dept: ORTHOPEDIC SURGERY | Facility: CLINIC | Age: 79
End: 2024-05-21

## 2024-05-22 ENCOUNTER — APPOINTMENT (OUTPATIENT)
Dept: FAMILY MEDICINE | Facility: CLINIC | Age: 79
End: 2024-05-22
Payer: MEDICARE

## 2024-05-22 VITALS
HEIGHT: 66 IN | BODY MASS INDEX: 30.53 KG/M2 | HEART RATE: 67 BPM | SYSTOLIC BLOOD PRESSURE: 130 MMHG | DIASTOLIC BLOOD PRESSURE: 80 MMHG | WEIGHT: 190 LBS | RESPIRATION RATE: 14 BRPM | OXYGEN SATURATION: 98 %

## 2024-05-22 DIAGNOSIS — E53.8 DEFICIENCY OF OTHER SPECIFIED B GROUP VITAMINS: ICD-10-CM

## 2024-05-22 DIAGNOSIS — Z87.898 PERSONAL HISTORY OF OTHER SPECIFIED CONDITIONS: ICD-10-CM

## 2024-05-22 DIAGNOSIS — R73.03 PREDIABETES.: ICD-10-CM

## 2024-05-22 DIAGNOSIS — L24.9 IRRITANT CONTACT DERMATITIS, UNSPECIFIED CAUSE: ICD-10-CM

## 2024-05-22 DIAGNOSIS — Z87.09 PERSONAL HISTORY OF OTHER DISEASES OF THE RESPIRATORY SYSTEM: ICD-10-CM

## 2024-05-22 DIAGNOSIS — Z86.39 PERSONAL HISTORY OF OTHER ENDOCRINE, NUTRITIONAL AND METABOLIC DISEASE: ICD-10-CM

## 2024-05-22 DIAGNOSIS — Z87.39 PERSONAL HISTORY OF OTHER DISEASES OF THE MUSCULOSKELETAL SYSTEM AND CONNECTIVE TISSUE: ICD-10-CM

## 2024-05-22 DIAGNOSIS — R10.12 LEFT UPPER QUADRANT PAIN: ICD-10-CM

## 2024-05-22 DIAGNOSIS — M85.80 OTHER SPECIFIED DISORDERS OF BONE DENSITY AND STRUCTURE, UNSPECIFIED SITE: ICD-10-CM

## 2024-05-22 DIAGNOSIS — T84.018D BROKEN INTERNAL JOINT PROSTHESIS, OTHER SITE, SUBSEQUENT ENCOUNTER: ICD-10-CM

## 2024-05-22 DIAGNOSIS — Z87.19 PERSONAL HISTORY OF OTHER DISEASES OF THE DIGESTIVE SYSTEM: ICD-10-CM

## 2024-05-22 DIAGNOSIS — Z01.818 ENCOUNTER FOR OTHER PREPROCEDURAL EXAMINATION: ICD-10-CM

## 2024-05-22 DIAGNOSIS — Z96.649 BROKEN INTERNAL JOINT PROSTHESIS, OTHER SITE, SUBSEQUENT ENCOUNTER: ICD-10-CM

## 2024-05-22 PROCEDURE — 99214 OFFICE O/P EST MOD 30 MIN: CPT

## 2024-05-22 RX ORDER — MULTIVITAMIN
TABLET ORAL
Refills: 0 | Status: ACTIVE | COMMUNITY

## 2024-05-22 RX ORDER — SUCRALFATE 1 G/10ML
1 SUSPENSION ORAL 4 TIMES DAILY
Qty: 1 | Refills: 5 | Status: COMPLETED | COMMUNITY
Start: 2023-04-11 | End: 2024-05-22

## 2024-05-22 RX ORDER — PANTOPRAZOLE 40 MG/1
40 TABLET, DELAYED RELEASE ORAL
Qty: 180 | Refills: 0 | Status: DISCONTINUED | COMMUNITY
Start: 2022-05-19 | End: 2024-05-22

## 2024-05-22 RX ORDER — CHROMIUM 200 MCG
1000 TABLET ORAL
Refills: 0 | Status: ACTIVE | COMMUNITY

## 2024-05-22 RX ORDER — BIOTIN 10000 MCG
10 CAPSULE ORAL
Refills: 0 | Status: ACTIVE | COMMUNITY

## 2024-05-22 RX ORDER — METOPROLOL SUCCINATE 25 MG/1
25 TABLET, EXTENDED RELEASE ORAL TWICE DAILY
Qty: 270 | Refills: 3 | Status: DISCONTINUED | COMMUNITY
Start: 2024-05-11 | End: 2024-05-22

## 2024-05-22 RX ORDER — ESOMEPRAZOLE MAGNESIUM 40 MG/1
40 CAPSULE, DELAYED RELEASE ORAL
Refills: 0 | Status: ACTIVE | COMMUNITY

## 2024-05-24 ENCOUNTER — OUTPATIENT (OUTPATIENT)
Dept: OUTPATIENT SERVICES | Facility: HOSPITAL | Age: 79
LOS: 1 days | Discharge: HOME | End: 2024-05-24
Payer: MEDICARE

## 2024-05-24 VITALS
DIASTOLIC BLOOD PRESSURE: 92 MMHG | HEART RATE: 78 BPM | TEMPERATURE: 99 F | RESPIRATION RATE: 18 BRPM | HEIGHT: 66 IN | OXYGEN SATURATION: 99 % | SYSTOLIC BLOOD PRESSURE: 127 MMHG | WEIGHT: 184.31 LBS

## 2024-05-24 DIAGNOSIS — T84.018D BROKEN INTERNAL JOINT PROSTHESIS, OTHER SITE, SUBSEQUENT ENCOUNTER: ICD-10-CM

## 2024-05-24 DIAGNOSIS — Z98.51 TUBAL LIGATION STATUS: Chronic | ICD-10-CM

## 2024-05-24 DIAGNOSIS — T84.038A MECHANICAL LOOSENING OF OTHER INTERNAL PROSTHETIC JOINT, INITIAL ENCOUNTER: ICD-10-CM

## 2024-05-24 DIAGNOSIS — I10 ESSENTIAL (PRIMARY) HYPERTENSION: ICD-10-CM

## 2024-05-24 DIAGNOSIS — Z96.642 PRESENCE OF LEFT ARTIFICIAL HIP JOINT: ICD-10-CM

## 2024-05-24 DIAGNOSIS — F41.9 ANXIETY DISORDER, UNSPECIFIED: ICD-10-CM

## 2024-05-24 DIAGNOSIS — Z98.890 OTHER SPECIFIED POSTPROCEDURAL STATES: Chronic | ICD-10-CM

## 2024-05-24 DIAGNOSIS — Z96.642 PRESENCE OF LEFT ARTIFICIAL HIP JOINT: Chronic | ICD-10-CM

## 2024-05-24 DIAGNOSIS — I25.10 ATHEROSCLEROTIC HEART DISEASE OF NATIVE CORONARY ARTERY WITHOUT ANGINA PECTORIS: ICD-10-CM

## 2024-05-24 DIAGNOSIS — Z01.818 ENCOUNTER FOR OTHER PREPROCEDURAL EXAMINATION: ICD-10-CM

## 2024-05-24 LAB
A1C WITH ESTIMATED AVERAGE GLUCOSE RESULT: 5.6 % — SIGNIFICANT CHANGE UP (ref 4–5.6)
ALBUMIN SERPL ELPH-MCNC: 4 G/DL — SIGNIFICANT CHANGE UP (ref 3.3–5)
ALP SERPL-CCNC: 124 U/L — HIGH (ref 40–120)
ALT FLD-CCNC: 15 U/L — SIGNIFICANT CHANGE UP (ref 12–78)
ANION GAP SERPL CALC-SCNC: 3 MMOL/L — LOW (ref 5–17)
APTT BLD: 27.4 SEC — SIGNIFICANT CHANGE UP (ref 24.5–35.6)
AST SERPL-CCNC: 14 U/L — LOW (ref 15–37)
BASOPHILS # BLD AUTO: 0.03 K/UL — SIGNIFICANT CHANGE UP (ref 0–0.2)
BASOPHILS NFR BLD AUTO: 0.6 % — SIGNIFICANT CHANGE UP (ref 0–2)
BILIRUB SERPL-MCNC: 0.4 MG/DL — SIGNIFICANT CHANGE UP (ref 0.2–1.2)
BLD GP AB SCN SERPL QL: SIGNIFICANT CHANGE UP
BUN SERPL-MCNC: 27 MG/DL — HIGH (ref 7–23)
CALCIUM SERPL-MCNC: 9.9 MG/DL — SIGNIFICANT CHANGE UP (ref 8.5–10.1)
CHLORIDE SERPL-SCNC: 107 MMOL/L — SIGNIFICANT CHANGE UP (ref 96–108)
CO2 SERPL-SCNC: 30 MMOL/L — SIGNIFICANT CHANGE UP (ref 22–31)
CREAT SERPL-MCNC: 0.92 MG/DL — SIGNIFICANT CHANGE UP (ref 0.5–1.3)
EGFR: 64 ML/MIN/1.73M2 — SIGNIFICANT CHANGE UP
EOSINOPHIL # BLD AUTO: 0.07 K/UL — SIGNIFICANT CHANGE UP (ref 0–0.5)
EOSINOPHIL NFR BLD AUTO: 1.4 % — SIGNIFICANT CHANGE UP (ref 0–6)
ESTIMATED AVERAGE GLUCOSE: 114 MG/DL — SIGNIFICANT CHANGE UP (ref 68–114)
GLUCOSE SERPL-MCNC: 104 MG/DL — HIGH (ref 70–99)
HCT VFR BLD CALC: 37.7 % — SIGNIFICANT CHANGE UP (ref 34.5–45)
HGB BLD-MCNC: 12.2 G/DL — SIGNIFICANT CHANGE UP (ref 11.5–15.5)
IMM GRANULOCYTES NFR BLD AUTO: 0.2 % — SIGNIFICANT CHANGE UP (ref 0–0.9)
INR BLD: 0.9 RATIO — SIGNIFICANT CHANGE UP (ref 0.85–1.18)
LYMPHOCYTES # BLD AUTO: 0.73 K/UL — LOW (ref 1–3.3)
LYMPHOCYTES # BLD AUTO: 14.5 % — SIGNIFICANT CHANGE UP (ref 13–44)
MCHC RBC-ENTMCNC: 28.5 PG — SIGNIFICANT CHANGE UP (ref 27–34)
MCHC RBC-ENTMCNC: 32.4 G/DL — SIGNIFICANT CHANGE UP (ref 32–36)
MCV RBC AUTO: 88.1 FL — SIGNIFICANT CHANGE UP (ref 80–100)
MONOCYTES # BLD AUTO: 0.59 K/UL — SIGNIFICANT CHANGE UP (ref 0–0.9)
MONOCYTES NFR BLD AUTO: 11.8 % — SIGNIFICANT CHANGE UP (ref 2–14)
MRSA PCR RESULT.: SIGNIFICANT CHANGE UP
NEUTROPHILS # BLD AUTO: 3.59 K/UL — SIGNIFICANT CHANGE UP (ref 1.8–7.4)
NEUTROPHILS NFR BLD AUTO: 71.5 % — SIGNIFICANT CHANGE UP (ref 43–77)
NRBC # BLD: 0 /100 WBCS — SIGNIFICANT CHANGE UP (ref 0–0)
PLATELET # BLD AUTO: 200 K/UL — SIGNIFICANT CHANGE UP (ref 150–400)
POTASSIUM SERPL-MCNC: 4.2 MMOL/L — SIGNIFICANT CHANGE UP (ref 3.5–5.3)
POTASSIUM SERPL-SCNC: 4.2 MMOL/L — SIGNIFICANT CHANGE UP (ref 3.5–5.3)
PROT SERPL-MCNC: 7.7 GM/DL — SIGNIFICANT CHANGE UP (ref 6–8.3)
PROTHROM AB SERPL-ACNC: 10.8 SEC — SIGNIFICANT CHANGE UP (ref 9.5–13)
RBC # BLD: 4.28 M/UL — SIGNIFICANT CHANGE UP (ref 3.8–5.2)
RBC # FLD: 12.9 % — SIGNIFICANT CHANGE UP (ref 10.3–14.5)
S AUREUS DNA NOSE QL NAA+PROBE: SIGNIFICANT CHANGE UP
SODIUM SERPL-SCNC: 140 MMOL/L — SIGNIFICANT CHANGE UP (ref 135–145)
WBC # BLD: 5.02 K/UL — SIGNIFICANT CHANGE UP (ref 3.8–10.5)
WBC # FLD AUTO: 5.02 K/UL — SIGNIFICANT CHANGE UP (ref 3.8–10.5)

## 2024-05-24 PROCEDURE — 93010 ELECTROCARDIOGRAM REPORT: CPT

## 2024-05-24 RX ORDER — ACETAMINOPHEN 500 MG/5ML
650 LIQUID (ML) ORAL ONCE
Refills: 0 | Status: DISCONTINUED | OUTPATIENT
Start: 2024-06-13 | End: 2024-06-13

## 2024-05-24 RX ORDER — LANOLIN ALCOHOL/MO/W.PET/CERES
1 CREAM (GRAM) TOPICAL
Qty: 0 | Refills: 0 | DISCHARGE

## 2024-05-24 NOTE — OCCUPATIONAL THERAPY INITIAL EVALUATION ADULT - OCCUPATION
[facility]  [address] [place] [address]  Phone: [phone]    Name:  Jcae Laboy  Current Date: 2017  : 1978  MRN: [id number]   TANA: [id number]    Visit Date: 2017  Address: [address] [facility] [address]  Phone: [phone]    Primary Care Provider     Name: Non- Pcp    Phone: None    The staff of [facility] would like to thank you for allowing us to assist you with your healthcare needs. The following includes patient education materials and information on how best to care for your illness/injury at home and when to see a physician. If you need to locate a Doctor or clinic close to you, please call the Doctor Referral Service at [phone]. The Service is available Monday through Thursday from 8 AM to 8 PM and  from 8 AM to 4 PM.    Patients Please Note: If further time off is required, or a medical clearance to return to work is required, it must be obtained through your primary physician.  Return to work clearances and extensions of \"Time-Off\" will not be given by the Emergency Department.     We hope that you leave our Emergency Department believing that we provided you with very good care.   Your Opinion Matters To Us  If you receive a patient satisfaction survey in the mail, please complete and return it in the postage-paid envelope.  We truly value and appreciate your feedback.  Emergency Department Care Providers   Physician:Dustin Madrid MD    Advanced Practice Provider:  Physician Assistant: Tim Weinstein PA-C RN_________________ ED Tech__________________ Clerical_________________         Retired.

## 2024-05-24 NOTE — OCCUPATIONAL THERAPY INITIAL EVALUATION ADULT - ADDITIONAL COMMENTS
Pt lives with  (Who can assist post op) in a condo with 1 threshold step to enter. Once inside, the pt main bedroom and bathroom is on that floor when entering. The pts bathroom has a walk in shower stall, fixed/retractable shower head, comfort height toilet seat and 1x grab bars. Pt reports that she will privately purchase a 3/1 commode. The pt ambulates with no device and owns a rolling walker and a SAC. The pt daily pain is a 0/10 at rest and a 5-6/10 with movement. The pt manages the pain with rest and does not take any pain medications. The pt reports being allergic to narcotics. The pt wears glasses all the time, R handed, drives and has no hearing impairments.

## 2024-05-24 NOTE — H&P PST ADULT - NSICDXPASTMEDICALHX_GEN_ALL_CORE_FT
PAST MEDICAL HISTORY:  Atrial myxoma     CAD (coronary artery disease)     Cluster headache     Disc degeneration, lumbar     Folate deficiency     Generalized anxiety disorder     GERD (gastroesophageal reflux disease)     H/O diverticulitis of colon     Hair loss on minoxidil    Hiatal hernia     History of duodenal ulcer     History of prediabetes     HLD (hyperlipidemia)     HTN (hypertension)     IBS (irritable bowel syndrome)     Insomnia     Migraine headache     Osteoarthritis, chronic     Osteoporosis     Rotator cuff tear arthropathy     Statin-induced myositis     Vitamin B12 deficiency     Vitamin D deficiency      PAST MEDICAL HISTORY:  Atrial myxoma     CAD (coronary artery disease)     Cluster headache     Disc degeneration, lumbar     Folate deficiency     Generalized anxiety disorder     GERD (gastroesophageal reflux disease)     H/O diverticulitis of colon     H/O osteopenia     Hair loss on minoxidil    Hiatal hernia     History of duodenal ulcer     History of prediabetes     HLD (hyperlipidemia)     HTN (hypertension)     IBS (irritable bowel syndrome)     Insomnia     Migraine headache     Osteoarthritis, chronic     Rotator cuff tear arthropathy     Statin-induced myositis     Vitamin B12 deficiency     Vitamin D deficiency      PAST MEDICAL HISTORY:  Atrial myxoma     CAD (coronary artery disease)     Cluster headache     Disc degeneration, lumbar     Enlarged liver     Folate deficiency     Generalized anxiety disorder     GERD (gastroesophageal reflux disease)     H/O diverticulitis of colon     H/O osteopenia     Hair loss on minoxidil    Hiatal hernia     History of duodenal ulcer     History of prediabetes     HLD (hyperlipidemia)     HTN (hypertension)     IBS (irritable bowel syndrome)     Insomnia     Migraine headache     Osteoarthritis, chronic     Rotator cuff tear arthropathy     Statin-induced myositis     Vitamin B12 deficiency     Vitamin D deficiency

## 2024-05-24 NOTE — OCCUPATIONAL THERAPY INITIAL EVALUATION ADULT - NSOTDMEREC_GEN_A_CORE
Recommending 3/1 commode for safe transfers and ADL management. Pt reports that she will privately purchase a 3/1 commode.

## 2024-05-24 NOTE — H&P PST ADULT - PROBLEM SELECTOR PLAN 1
Revision left total hip removal component reclaim monoblock and modular with riccardo, possible revision of acetabulum  labs - cbc,pt/ptt,bmp,t&s,nose cx,ekg  M/C required  cardiac clearance  preop 3 day hibiclens instruction reviewed and given .instructed on if  nose cx positive use mupuricin 5 days and checklist given  take routine meds DOS with sips of water. avoid NSAID and OTC supplements. verbalized understanding  information on proper nutrition , increase protein and better food choices provided in packet   Ensure clear NOT given 2/2 prediabetes  Anesthesiologist to review PST labs, EKG, required clearances and optimization for surgery.

## 2024-05-24 NOTE — OCCUPATIONAL THERAPY INITIAL EVALUATION ADULT - PERTINENT HX OF CURRENT PROBLEM, REHAB EVAL
L hip THR failure of hardware which requires revision. Pt is scheduled for L THR revision on 6/13/24.

## 2024-05-24 NOTE — H&P PST ADULT - HISTORY OF PRESENT ILLNESS
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 Pt with strong family hx of heart dz: grandmother  suddenly at age 65, brother  at 76 from MI, younger brother  from MI age at 59, 3rd brother with CABG.  Pt denies MI/CVA/lung/kidney/liver disease. Pt presents with left heart cath with Dr Oneal today with myxoma excision with Dr Wade tomorrow.     TTE 3/10/22: EF 55-60%, mod echogenic mass, pedunculated with attachment to the posterior wall of right atrium, measuring 2.8cm c/w right atrial myxoma    CTA heart 3/16/22: possible obstructive RCA dz, diffuse moderated dz LAD and LCX. Ascending aorta 4.1 cm.      ...........77 year old  female no implantable devices with h/o HTN, HLD, GERD, YIN, prediabetes, migraine (on Fiorcet and Valium), hair loss (on minoxidil) recently diagnosed right atrial myxoma diagnosed by Dr Kiko Hurtado. Pt with strong family hx of heart dz: grandmother  suddenly at age 65, brother  at 76 from MI, younger brother  from MI age at 59, 3rd brother with CABG.  Pt denies MI/CVA/lung/kidney/liver disease. Pt presents with left heart cath with Dr Oneal today with myxoma excision with Dr Wade tomorrow.     TTE 3/10/22: EF 55-60%, mod echogenic mass, pedunculated with attachment to the posterior wall of right atrium, measuring 2.8cm c/w right atrial myxoma    CTA heart 3/16/22: possible obstructive RCA dz, diffuse moderated dz LAD and LCX. Ascending aorta 4.1 cm.      79F pmh HTN, HLD, GERD, CAD, prediabetes, right atrial myxoma (s/p excision), anxiety here for PST for scheduled Revision left total hip removal component reclaim monoblock and modular with riccardo, possible revision of acetabulum with Dr. Garcia on 6-

## 2024-05-24 NOTE — H&P PST ADULT - ASSESSMENT
CAPRINI SCORE    AGE RELATED RISK FACTORS                                                             [ ] Age 41-60 years                                            (1 Point)  [ ] Age: 61-74 years                                           (2 Points)                 [ ] Age= 75 years                                                (3 Points)             DISEASE RELATED RISK FACTORS                                                       [ ] Edema in the lower extremities                 (1 Point)                     [ ] Varicose veins                                               (1 Point)                                 [ ] BMI > 25 Kg/m2                                            (1 Point)                                  [ ] Serious infection (ie PNA)                            (1 Point)                     [ ] Lung disease ( COPD, Emphysema)            (1 Point)                                                                          [ ] Acute myocardial infarction                         (1 Point)                  [ ] Congestive heart failure (in the previous month)  (1 Point)         [ ] Inflammatory bowel disease                            (1 Point)                  [ ] Central venous access, PICC or Port               (2 points)       (within the last month)                                                                [ ] Stroke (in the previous month)                        (5 Points)    [ ] Previous or present malignancy                       (2 points)                                                                                                                                                         HEMATOLOGY RELATED FACTORS                                                         [ ] Prior episodes of VTE                                     (3 Points)                     [ ] Positive family history for VTE                      (3 Points)                  [ ] Prothrombin 70147 A                                     (3 Points)                     [ ] Factor V Leiden                                                (3 Points)                        [ ] Lupus anticoagulants                                      (3 Points)                                                           [ ] Anticardiolipin antibodies                              (3 Points)                                                       [ ] High homocysteine in the blood                   (3 Points)                                             [ ] Other congenital or acquired thrombophilia      (3 Points)                                                [ ] Heparin induced thrombocytopenia                  (3 Points)                                        MOBILITY RELATED FACTORS  [ ] Bed rest                                                         (1 Point)  [ ] Plaster cast                                                    (2 points)  [ ] Bed bound for more than 72 hours           (2 Points)    GENDER SPECIFIC FACTORS  [ ] Pregnancy or had a baby within the last month   (1 Point)  [ ] Post-partum < 6 weeks                                   (1 Point)  [ ] Hormonal therapy  or oral contraception   (1 Point)  [ ] History of pregnancy complications              (1 point)  [ ] Unexplained or recurrent              (1 Point)    OTHER RISK FACTORS                                           (1 Point)  [ ] BMI >40, smoking, diabetes requiring insulin, chemotherapy  blood transfusions and length of surgery over 2 hours    SURGERY RELATED RISK FACTORS  [ ]  Section within the last month     (1 Point)  [ ] Minor surgery                                                  (1 Point)  [ ] Arthroscopic surgery                                       (2 Points)  [ ] Planned major surgery lasting more            (2 Points)      than 45 minutes     [ ] Elective hip or knee joint replacement       (5 points)       surgery                                                TRAUMA RELATED RISK FACTORS  [ ] Fracture of the hip, pelvis, or leg                       (5 Points)  [ ] Spinal cord injury resulting in paralysis             (5 points)       (in the previous month)    [ ] Paralysis  (less than 1 month)                             (5 Points)  [ ] Multiple Trauma within 1 month                        (5 Points)    Total Score [        ]    Caprini Score 0-2: Low Risk, NO VTE prophylaxis required for most patients, encourage ambulation  Caprini Score 3-6: Moderate Risk , pharmacologic VTE prophylaxis is indicated for most patients (in the absence of contraindications)  Caprini Score Greater than or =7: High risk, pharmocologic VTE prophylaxis indicated for most patients (in the absence of contraindications)                               79F pmh HTN, HLD, GERD, CAD, prediabetes, right atrial myxoma (s/p excision), anxiety here for PST for scheduled Revision left total hip removal component reclaim monoblock and modular with riccardo, possible revision of acetabulum with Dr. Garcia on 2024  CAPRINI SCORE    AGE RELATED RISK FACTORS                                                             [ ] Age 41-60 years                                            (1 Point)  [ ] Age: 61-74 years                                           (2 Points)                 [x ] Age= 75 years                                                (3 Points)             DISEASE RELATED RISK FACTORS                                                       [ x] Edema in the lower extremities                 (1 Point)                     [ ] Varicose veins                                               (1 Point)                                 [x ] BMI > 25 Kg/m2                                            (1 Point)                                  [ ] Serious infection (ie PNA)                            (1 Point)                     [ ] Lung disease ( COPD, Emphysema)            (1 Point)                                                                          [ ] Acute myocardial infarction                         (1 Point)                  [ ] Congestive heart failure (in the previous month)  (1 Point)         [ ] Inflammatory bowel disease                            (1 Point)                  [ ] Central venous access, PICC or Port               (2 points)       (within the last month)                                                                [ ] Stroke (in the previous month)                        (5 Points)    [ ] Previous or present malignancy                       (2 points)                                                                                                                                                         HEMATOLOGY RELATED FACTORS                                                         [ ] Prior episodes of VTE                                     (3 Points)                     [ ] Positive family history for VTE                      (3 Points)                  [ ] Prothrombin 27211 A                                     (3 Points)                     [ ] Factor V Leiden                                                (3 Points)                        [ ] Lupus anticoagulants                                      (3 Points)                                                           [ ] Anticardiolipin antibodies                              (3 Points)                                                       [ ] High homocysteine in the blood                   (3 Points)                                             [ ] Other congenital or acquired thrombophilia      (3 Points)                                                [ ] Heparin induced thrombocytopenia                  (3 Points)                                        MOBILITY RELATED FACTORS  [ ] Bed rest                                                         (1 Point)  [ ] Plaster cast                                                    (2 points)  [ ] Bed bound for more than 72 hours           (2 Points)    GENDER SPECIFIC FACTORS  [ ] Pregnancy or had a baby within the last month   (1 Point)  [ ] Post-partum < 6 weeks                                   (1 Point)  [ ] Hormonal therapy  or oral contraception   (1 Point)  [ ] History of pregnancy complications              (1 point)  [ ] Unexplained or recurrent              (1 Point)    OTHER RISK FACTORS                                           (1 Point)  [ ] BMI >40, smoking, diabetes requiring insulin, chemotherapy  blood transfusions and length of surgery over 2 hours    SURGERY RELATED RISK FACTORS  [ ]  Section within the last month     (1 Point)  [ ] Minor surgery                                                  (1 Point)  [ ] Arthroscopic surgery                                       (2 Points)  [ ] Planned major surgery lasting more            (2 Points)      than 45 minutes     [x ] Elective hip or knee joint replacement       (5 points)       surgery                                                TRAUMA RELATED RISK FACTORS  [ ] Fracture of the hip, pelvis, or leg                       (5 Points)  [ ] Spinal cord injury resulting in paralysis             (5 points)       (in the previous month)    [ ] Paralysis  (less than 1 month)                             (5 Points)  [ ] Multiple Trauma within 1 month                        (5 Points)    Total Score [    10    Caprini Score 0-2: Low Risk, NO VTE prophylaxis required for most patients, encourage ambulation  Caprini Score 3-6: Moderate Risk , pharmacologic VTE prophylaxis is indicated for most patients (in the absence of contraindications)  Caprini Score Greater than or =7: High risk, pharmocologic VTE prophylaxis indicated for most patients (in the absence of contraindications)

## 2024-05-25 LAB — VIT D25+D1,25 OH+D1,25 PNL SERPL-MCNC: 32.7 PG/ML — SIGNIFICANT CHANGE UP (ref 19.9–79.3)

## 2024-05-28 ENCOUNTER — RX RENEWAL (OUTPATIENT)
Age: 79
End: 2024-05-28

## 2024-05-28 RX ORDER — ROSUVASTATIN CALCIUM 5 MG/1
5 TABLET, FILM COATED ORAL
Qty: 90 | Refills: 3 | Status: ACTIVE | COMMUNITY
Start: 2021-06-05 | End: 1900-01-01

## 2024-05-28 NOTE — RESULTS/DATA
[] : results reviewed [ECG Reviewed] : reviewed [Normal] : The 12 - lead ECG is normal [NSR] : normal sinus rhythm [Ventricular Rate___] : ventricular rate is [unfilled] beats per minute [P Waves Normal] : the P wave is normal [LAD] : left axis deviation [Normal ST Segments] : the ST segments are normal [de-identified] : nl [de-identified] : nl [de-identified] : glucose 104 [de-identified] : mrsa neg

## 2024-05-28 NOTE — PHYSICAL EXAM
[No Acute Distress] : no acute distress [Well Nourished] : well nourished [Well Developed] : well developed [Well-Appearing] : well-appearing [Normal Sclera/Conjunctiva] : normal sclera/conjunctiva [PERRL] : pupils equal round and reactive to light [EOMI] : extraocular movements intact [Normal Outer Ear/Nose] : the outer ears and nose were normal in appearance [Normal Oropharynx] : the oropharynx was normal [No JVD] : no jugular venous distention [No Lymphadenopathy] : no lymphadenopathy [Supple] : supple [Thyroid Normal, No Nodules] : the thyroid was normal and there were no nodules present [No Respiratory Distress] : no respiratory distress  [No Accessory Muscle Use] : no accessory muscle use [Clear to Auscultation] : lungs were clear to auscultation bilaterally [Normal Rate] : normal rate  [Regular Rhythm] : with a regular rhythm [Normal S1, S2] : normal S1 and S2 [No Murmur] : no murmur heard [No Carotid Bruits] : no carotid bruits [No Abdominal Bruit] : a ~M bruit was not heard ~T in the abdomen [No Varicosities] : no varicosities [Pedal Pulses Present] : the pedal pulses are present [No Palpable Aorta] : no palpable aorta [No Extremity Clubbing/Cyanosis] : no extremity clubbing/cyanosis [Soft] : abdomen soft [Non-distended] : non-distended [Normal Bowel Sounds] : normal bowel sounds [Normal Posterior Cervical Nodes] : no posterior cervical lymphadenopathy [Normal Anterior Cervical Nodes] : no anterior cervical lymphadenopathy [No CVA Tenderness] : no CVA  tenderness [No Spinal Tenderness] : no spinal tenderness [No Joint Swelling] : no joint swelling [Grossly Normal Strength/Tone] : grossly normal strength/tone [No Rash] : no rash [Coordination Grossly Intact] : coordination grossly intact [No Focal Deficits] : no focal deficits [Normal Gait] : normal gait [Deep Tendon Reflexes (DTR)] : deep tendon reflexes were 2+ and symmetric [Normal Affect] : the affect was normal [Normal Insight/Judgement] : insight and judgment were intact [de-identified] : bridge  [de-identified] : left ribs painful  [de-identified] : left foot and ankle 1 + edema  + varicosities [de-identified] : left upper quad pain .  [de-identified] : recently her niece  she is upset  // nervous about surgery also

## 2024-05-28 NOTE — CONSULT LETTER
[Consult Letter:] : I had the pleasure of evaluating your patient, [unfilled]. [Consult Closing:] : Thank you very much for allowing me to participate in the care of this patient.  If you have any questions, please do not hesitate to contact me. [FreeTextEntry1] : patient medically cleared

## 2024-05-28 NOTE — REVIEW OF SYSTEMS
[Lower Ext Edema] : lower extremity edema [Joint Pain] : joint pain [Joint Stiffness] : joint stiffness [Anxiety] : anxiety [Negative] : Neurological [Joint Swelling] : no joint swelling [FreeTextEntry5] : left foot ankle edema [FreeTextEntry9] : hip pain [de-identified] : niece just .

## 2024-05-28 NOTE — HISTORY OF PRESENT ILLNESS
[Coronary Artery Disease] : coronary artery disease [Self] : previous adverse anesthesia reaction [Chronic Anticoagulation] : chronic anticoagulation [(Patient denies any chest pain, claudication, dyspnea on exertion, orthopnea, palpitations or syncope)] : Patient denies any chest pain, claudication, dyspnea on exertion, orthopnea, palpitations or syncope [Aortic Stenosis] : no aortic stenosis [Atrial Fibrillation] : no atrial fibrillation [Recent Myocardial Infarction] : no recent myocardial infarction [Implantable Device/Pacemaker] : no implantable device/pacemaker [Asthma] : no asthma [COPD] : no COPD [Sleep Apnea] : no sleep apnea [Smoker] : not a smoker [Family Member] : no family member with adverse anesthesia reaction/sudden death [Chronic Kidney Disease] : no chronic kidney disease [Diabetes] : no diabetes [FreeTextEntry1] : Left hip revision surgery [FreeTextEntry2] : 6/13/2024 [FreeTextEntry3] : Dr. Michael Garcia [FreeTextEntry4] : First left hip surgery 5/2018 . This is her 2nd left hip revision.  [FreeTextEntry5] : right atrial myxoma  surgery   4/1/22   //               smoked 10 years. quit age 27.  // anesthesia- chill nausea vomiting anxiety   // she takes aspirin 81 mg

## 2024-05-28 NOTE — ASSESSMENT
[High Risk Surgery - Intraperitoneal, Intrathoracic or Supringuinal Vascular Procedures] : High Risk Surgery - Intraperitoneal, Intrathoracic or Supringuinal Vascular Procedures - No (0) [Ischemic Heart Disease] : Ischemic Heart Disease - No (0) [Congestive Heart Failure] : Congestive Heart Failure - No (0) [Prior Cerebrovascular Accident or TIA] : Prior Cerebrovascular Accident or TIA - No (0) [Creatinine >= 2mg/dL (1 Point)] : Creatinine >= 2mg/dL - No (0) [Insulin-dependent Diabetic (1 Point)] : Insulin-dependent Diabetic - No (0) [0] : 0 , RCRI Class: I, Risk of Post-Op Cardiac Complications: 3.9%, 95% CI for Risk Estimate: 2.8% - 5.4% [No Further Testing Recommended] : no further testing recommended [Continue medications as is] : Continue current medications [As per surgery] : as per surgery [Patient Optimized for Surgery] : Patient optimized for surgery [FreeTextEntry4] : patient cleared for surgery

## 2024-06-06 ENCOUNTER — NON-APPOINTMENT (OUTPATIENT)
Age: 79
End: 2024-06-06

## 2024-06-06 ENCOUNTER — APPOINTMENT (OUTPATIENT)
Dept: PULMONOLOGY | Facility: CLINIC | Age: 79
End: 2024-06-06
Payer: MEDICARE

## 2024-06-06 VITALS
HEART RATE: 78 BPM | SYSTOLIC BLOOD PRESSURE: 126 MMHG | BODY MASS INDEX: 29.25 KG/M2 | DIASTOLIC BLOOD PRESSURE: 80 MMHG | OXYGEN SATURATION: 98 % | TEMPERATURE: 97.6 F | HEIGHT: 66 IN | WEIGHT: 182 LBS

## 2024-06-06 DIAGNOSIS — R91.1 SOLITARY PULMONARY NODULE: ICD-10-CM

## 2024-06-06 PROBLEM — Z87.39 PERSONAL HISTORY OF OTHER DISEASES OF THE MUSCULOSKELETAL SYSTEM AND CONNECTIVE TISSUE: Chronic | Status: ACTIVE | Noted: 2024-05-24

## 2024-06-06 PROBLEM — R16.0 HEPATOMEGALY, NOT ELSEWHERE CLASSIFIED: Chronic | Status: ACTIVE | Noted: 2024-05-24

## 2024-06-06 PROCEDURE — 99215 OFFICE O/P EST HI 40 MIN: CPT

## 2024-06-06 RX ORDER — SENNOSIDES 8.6 MG TABLETS 8.6 MG/1
TABLET ORAL
Refills: 0 | Status: DISCONTINUED | COMMUNITY
End: 2024-06-06

## 2024-06-06 RX ORDER — AMOXICILLIN 500 MG/1
500 CAPSULE ORAL
Qty: 16 | Refills: 0 | Status: DISCONTINUED | COMMUNITY
Start: 2024-03-26 | End: 2024-06-06

## 2024-06-06 NOTE — DISCUSSION/SUMMARY
[FreeTextEntry1] : Ms. Dubois has an enlarging opacity in the left upper lobe apical segment.  I reviewed all the CAT scans with the patient we clearly see a slow but steady progression.  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  I discussed the options with her 1 observation to surgical resection in 3 transthoracic needle biopsy attempt with fiducial placement.  I favor a biopsy attempt with fiducial placement.  The patient is having hip surgery and then rehab and does not want any studies/biopsies done till after that is resolved.  It is a very slow-growing lesion and although I emphasized to her it is always better to take care of these issues sooner rather than later is unlikely a few months down the line will be any significant change.  The patient has an aortic thoracic aorta 4.5 cm which did measure 4.1.  She sees Dr. Kiko Duffy cardiology and asked her to follow-up with him regarding serial ultrasounds to check on the sizes.  Lastly she had a pedal megaly as per the CAT scan.  She denies all symptoms and had normal liver function test.  Although this is likely not without pathologic she will speak to her GI doctor regarding same. The patient understands and agrees with the plan of care. Today's office visit encompassed 42 minutes. I conducted an extensive history, physical exam and reviewed diagnosis and treatment options including diagnostic tests radiology studies including cat scans and the use of prescription medication.

## 2024-06-06 NOTE — REASON FOR VISIT
[Follow-Up] : a follow-up visit [Abnormal CXR/ Chest CT] : an abnormal CXR/ chest CT [Pulmonary Nodules] : pulmonary nodules [Spouse] : spouse [TextEntry] : Patient has not been here for 2 years.  Patient had a CT scan at .  Patient has not pulmonary complaints.  Patient is having left hip revision surgery 6/13/24 at [facility] with Dr Michael Garcia.

## 2024-06-06 NOTE — HISTORY OF PRESENT ILLNESS
[Former] : former [Never] : never [TextBox_4] : 79 female hx cardiac surgery  and yoselyn opacity today feels good no sob no cough no wheeze no chest pain no  tightness no wt loss no blood  [TextBox_11] : 5 cig [TextBox_13] : 10 [YearQuit] : 1973

## 2024-06-06 NOTE — PROCEDURE
[FreeTextEntry1] : CT chest 6/3/2024 compared to 7/20/2022 and 1118 increased left apical density largest 14 mm was 8 mm in 2018

## 2024-06-12 ENCOUNTER — TRANSCRIPTION ENCOUNTER (OUTPATIENT)
Age: 79
End: 2024-06-12

## 2024-06-13 ENCOUNTER — INPATIENT (INPATIENT)
Facility: HOSPITAL | Age: 79
LOS: 1 days | Discharge: HOME HEALTH | End: 2024-06-15
Attending: ORTHOPAEDIC SURGERY | Admitting: ORTHOPAEDIC SURGERY
Payer: MEDICARE

## 2024-06-13 ENCOUNTER — RESULT REVIEW (OUTPATIENT)
Age: 79
End: 2024-06-13

## 2024-06-13 ENCOUNTER — APPOINTMENT (OUTPATIENT)
Dept: ORTHOPEDIC SURGERY | Facility: HOSPITAL | Age: 79
End: 2024-06-13
Payer: MEDICARE

## 2024-06-13 VITALS
DIASTOLIC BLOOD PRESSURE: 88 MMHG | WEIGHT: 182.98 LBS | HEIGHT: 66 IN | HEART RATE: 68 BPM | RESPIRATION RATE: 17 BRPM | TEMPERATURE: 98 F | OXYGEN SATURATION: 98 % | SYSTOLIC BLOOD PRESSURE: 145 MMHG

## 2024-06-13 DIAGNOSIS — Z98.890 OTHER SPECIFIED POSTPROCEDURAL STATES: Chronic | ICD-10-CM

## 2024-06-13 DIAGNOSIS — Z96.651 PRESENCE OF RIGHT ARTIFICIAL KNEE JOINT: Chronic | ICD-10-CM

## 2024-06-13 DIAGNOSIS — Z98.51 TUBAL LIGATION STATUS: Chronic | ICD-10-CM

## 2024-06-13 DIAGNOSIS — Z96.642 PRESENCE OF LEFT ARTIFICIAL HIP JOINT: Chronic | ICD-10-CM

## 2024-06-13 LAB
ANION GAP SERPL CALC-SCNC: 5 MMOL/L — SIGNIFICANT CHANGE UP (ref 5–17)
BLD GP AB SCN SERPL QL: SIGNIFICANT CHANGE UP
BUN SERPL-MCNC: 22 MG/DL — SIGNIFICANT CHANGE UP (ref 7–23)
CALCIUM SERPL-MCNC: 9.1 MG/DL — SIGNIFICANT CHANGE UP (ref 8.5–10.1)
CHLORIDE SERPL-SCNC: 111 MMOL/L — HIGH (ref 96–108)
CO2 SERPL-SCNC: 21 MMOL/L — LOW (ref 22–31)
CREAT SERPL-MCNC: 0.73 MG/DL — SIGNIFICANT CHANGE UP (ref 0.5–1.3)
EGFR: 85 ML/MIN/1.73M2 — SIGNIFICANT CHANGE UP
EGFR: 85 ML/MIN/1.73M2 — SIGNIFICANT CHANGE UP
GLUCOSE BLDC GLUCOMTR-MCNC: 91 MG/DL — SIGNIFICANT CHANGE UP (ref 70–99)
GLUCOSE SERPL-MCNC: 142 MG/DL — HIGH (ref 70–99)
HCT VFR BLD CALC: 35 % — SIGNIFICANT CHANGE UP (ref 34.5–45)
HGB BLD-MCNC: 11.3 G/DL — LOW (ref 11.5–15.5)
MCHC RBC-ENTMCNC: 28.5 PG — SIGNIFICANT CHANGE UP (ref 27–34)
MCHC RBC-ENTMCNC: 32.3 G/DL — SIGNIFICANT CHANGE UP (ref 32–36)
MCV RBC AUTO: 88.2 FL — SIGNIFICANT CHANGE UP (ref 80–100)
NRBC # BLD: 0 /100 WBCS — SIGNIFICANT CHANGE UP (ref 0–0)
NRBC BLD-RTO: 0 /100 WBCS — SIGNIFICANT CHANGE UP (ref 0–0)
PLATELET # BLD AUTO: 170 K/UL — SIGNIFICANT CHANGE UP (ref 150–400)
POTASSIUM SERPL-MCNC: 4.2 MMOL/L — SIGNIFICANT CHANGE UP (ref 3.5–5.3)
POTASSIUM SERPL-SCNC: 4.2 MMOL/L — SIGNIFICANT CHANGE UP (ref 3.5–5.3)
RBC # BLD: 3.97 M/UL — SIGNIFICANT CHANGE UP (ref 3.8–5.2)
RBC # FLD: 12.6 % — SIGNIFICANT CHANGE UP (ref 10.3–14.5)
SODIUM SERPL-SCNC: 137 MMOL/L — SIGNIFICANT CHANGE UP (ref 135–145)
WBC # BLD: 8.39 K/UL — SIGNIFICANT CHANGE UP (ref 3.8–10.5)
WBC # FLD AUTO: 8.39 K/UL — SIGNIFICANT CHANGE UP (ref 3.8–10.5)

## 2024-06-13 PROCEDURE — 73502 X-RAY EXAM HIP UNI 2-3 VIEWS: CPT | Mod: 26,LT

## 2024-06-13 PROCEDURE — 88305 TISSUE EXAM BY PATHOLOGIST: CPT | Mod: 26

## 2024-06-13 PROCEDURE — 88331 PATH CONSLTJ SURG 1 BLK 1SPC: CPT | Mod: 26

## 2024-06-13 PROCEDURE — 88300 SURGICAL PATH GROSS: CPT | Mod: 26,59

## 2024-06-13 PROCEDURE — 27134 REVISE HIP JOINT REPLACEMENT: CPT | Mod: RT

## 2024-06-13 PROCEDURE — 88311 DECALCIFY TISSUE: CPT | Mod: 26

## 2024-06-13 DEVICE — SHELL ACET PINNACLE SECTOR W/ GRIPTION 56MM: Type: IMPLANTABLE DEVICE | Site: LEFT | Status: FUNCTIONAL

## 2024-06-13 DEVICE — SCREW CANN BONE: Type: IMPLANTABLE DEVICE | Site: LEFT | Status: FUNCTIONAL

## 2024-06-13 DEVICE — IMP PINNACLE NEUT  PLUS 4 40X56MM: Type: IMPLANTABLE DEVICE | Site: LEFT | Status: FUNCTIONAL

## 2024-06-13 DEVICE — SCREW BONE CANN PINNACLE 6.5X15MM: Type: IMPLANTABLE DEVICE | Site: LEFT | Status: FUNCTIONAL

## 2024-06-13 RX ORDER — MAGNESIUM HYDROXIDE 400 MG/5ML
30 SUSPENSION ORAL DAILY
Refills: 0 | Status: DISCONTINUED | OUTPATIENT
Start: 2024-06-13 | End: 2024-06-15

## 2024-06-13 RX ORDER — DEXAMETHASONE 0.5 MG/1
10 TABLET ORAL ONCE
Refills: 0 | Status: COMPLETED | OUTPATIENT
Start: 2024-06-14 | End: 2024-06-14

## 2024-06-13 RX ORDER — POLYETHYLENE GLYCOL 3350 17 G/17G
17 POWDER, FOR SOLUTION ORAL
Qty: 0 | Refills: 0 | DISCHARGE

## 2024-06-13 RX ORDER — ROSUVASTATIN CALCIUM 5 MG/1
1 TABLET ORAL
Qty: 0 | Refills: 0 | DISCHARGE

## 2024-06-13 RX ORDER — OXYCODONE HYDROCHLORIDE 30 MG/1
5 TABLET ORAL EVERY 4 HOURS
Refills: 0 | Status: DISCONTINUED | OUTPATIENT
Start: 2024-06-13 | End: 2024-06-15

## 2024-06-13 RX ORDER — ACETAMINOPHEN 500 MG/5ML
975 LIQUID (ML) ORAL EVERY 8 HOURS
Refills: 0 | Status: DISCONTINUED | OUTPATIENT
Start: 2024-06-13 | End: 2024-06-13

## 2024-06-13 RX ORDER — TRAMADOL HYDROCHLORIDE 50 MG/1
50 TABLET, FILM COATED ORAL EVERY 4 HOURS
Refills: 0 | Status: DISCONTINUED | OUTPATIENT
Start: 2024-06-13 | End: 2024-06-15

## 2024-06-13 RX ORDER — FUROSEMIDE 10 MG/ML
20 INJECTION INTRAMUSCULAR; INTRAVENOUS DAILY
Refills: 0 | Status: DISCONTINUED | OUTPATIENT
Start: 2024-06-13 | End: 2024-06-15

## 2024-06-13 RX ORDER — SODIUM CHLORIDE 9 G/1000ML
1000 INJECTION, SOLUTION INTRAVENOUS
Refills: 0 | Status: DISCONTINUED | OUTPATIENT
Start: 2024-06-13 | End: 2024-06-13

## 2024-06-13 RX ORDER — ACETAMINOPHEN 500 MG/5ML
1000 LIQUID (ML) ORAL ONCE
Refills: 0 | Status: COMPLETED | OUTPATIENT
Start: 2024-06-13 | End: 2024-06-13

## 2024-06-13 RX ORDER — FENTANYL CITRATE-0.9 % NACL/PF 100MCG/2ML
50 SYRINGE (ML) INTRAVENOUS
Refills: 0 | Status: DISCONTINUED | OUTPATIENT
Start: 2024-06-13 | End: 2024-06-13

## 2024-06-13 RX ORDER — BUTALBITAL/ACETAMINOPHEN 50MG-650MG
1 TABLET ORAL
Qty: 0 | Refills: 0 | DISCHARGE

## 2024-06-13 RX ORDER — POLYETHYLENE GLYCOL 3350 17 G/17G
17 POWDER, FOR SOLUTION ORAL AT BEDTIME
Refills: 0 | Status: DISCONTINUED | OUTPATIENT
Start: 2024-06-13 | End: 2024-06-15

## 2024-06-13 RX ORDER — ONDANSETRON HCL/PF 4 MG/2 ML
4 VIAL (ML) INJECTION ONCE
Refills: 0 | Status: DISCONTINUED | OUTPATIENT
Start: 2024-06-13 | End: 2024-06-13

## 2024-06-13 RX ORDER — DIAZEPAM 5 MG
1 TABLET ORAL
Qty: 0 | Refills: 0 | DISCHARGE

## 2024-06-13 RX ORDER — CEFAZOLIN SODIUM IN 0.9 % NACL 3 G/100 ML
2000 INTRAVENOUS SOLUTION, PIGGYBACK (ML) INTRAVENOUS EVERY 8 HOURS
Refills: 0 | Status: COMPLETED | OUTPATIENT
Start: 2024-06-14 | End: 2024-06-14

## 2024-06-13 RX ORDER — SENNA 187 MG
2 TABLET ORAL AT BEDTIME
Refills: 0 | Status: DISCONTINUED | OUTPATIENT
Start: 2024-06-13 | End: 2024-06-15

## 2024-06-13 RX ORDER — ONDANSETRON HCL/PF 4 MG/2 ML
4 VIAL (ML) INJECTION EVERY 6 HOURS
Refills: 0 | Status: DISCONTINUED | OUTPATIENT
Start: 2024-06-13 | End: 2024-06-14

## 2024-06-13 RX ORDER — RIVAROXABAN 10 MG/1
10 TABLET, FILM COATED ORAL DAILY
Refills: 0 | Status: DISCONTINUED | OUTPATIENT
Start: 2024-06-14 | End: 2024-06-15

## 2024-06-13 RX ORDER — BUTALBITAL, ACETAMINOPHEN AND CAFFEINE 50; 325; 40 MG/1; MG/1; MG/1
1 TABLET ORAL EVERY 4 HOURS
Refills: 0 | Status: DISCONTINUED | OUTPATIENT
Start: 2024-06-13 | End: 2024-06-15

## 2024-06-13 RX ORDER — BISACODYL 5 MG
10 TABLET, DELAYED RELEASE (ENTERIC COATED) ORAL ONCE
Refills: 0 | Status: DISCONTINUED | OUTPATIENT
Start: 2024-06-15 | End: 2024-06-15

## 2024-06-13 RX ORDER — METOPROLOL SUCCINATE 50 MG/1
50 TABLET, EXTENDED RELEASE ORAL DAILY
Refills: 0 | Status: DISCONTINUED | OUTPATIENT
Start: 2024-06-13 | End: 2024-06-15

## 2024-06-13 RX ORDER — ATORVASTATIN CALCIUM 80 MG/1
20 TABLET, FILM COATED ORAL AT BEDTIME
Refills: 0 | Status: DISCONTINUED | OUTPATIENT
Start: 2024-06-13 | End: 2024-06-15

## 2024-06-13 RX ORDER — B1/B2/B3/B5/B6/B12/VIT C/FOLIC 500-0.5 MG
1 TABLET ORAL DAILY
Refills: 0 | Status: DISCONTINUED | OUTPATIENT
Start: 2024-06-13 | End: 2024-06-15

## 2024-06-13 RX ORDER — ACETAMINOPHEN 500 MG/5ML
1000 LIQUID (ML) ORAL ONCE
Refills: 0 | Status: DISCONTINUED | OUTPATIENT
Start: 2024-06-13 | End: 2024-06-13

## 2024-06-13 RX ORDER — OXYCODONE HYDROCHLORIDE 30 MG/1
10 TABLET ORAL EVERY 4 HOURS
Refills: 0 | Status: DISCONTINUED | OUTPATIENT
Start: 2024-06-13 | End: 2024-06-15

## 2024-06-13 RX ADMIN — Medication 400 MILLIGRAM(S): at 21:50

## 2024-06-13 RX ADMIN — Medication 2 TABLET(S): at 21:50

## 2024-06-13 RX ADMIN — Medication 125 MILLILITER(S): at 21:50

## 2024-06-13 RX ADMIN — Medication 100 MILLIGRAM(S): at 23:39

## 2024-06-13 RX ADMIN — METOPROLOL SUCCINATE 50 MILLIGRAM(S): 50 TABLET, EXTENDED RELEASE ORAL at 21:53

## 2024-06-13 RX ADMIN — OXYCODONE HYDROCHLORIDE 5 MILLIGRAM(S): 30 TABLET ORAL at 23:39

## 2024-06-13 RX ADMIN — Medication 3 MILLILITER(S): at 22:45

## 2024-06-13 RX ADMIN — TRAMADOL HYDROCHLORIDE 50 MILLIGRAM(S): 50 TABLET, FILM COATED ORAL at 22:33

## 2024-06-13 RX ADMIN — TRAMADOL HYDROCHLORIDE 50 MILLIGRAM(S): 50 TABLET, FILM COATED ORAL at 23:33

## 2024-06-13 RX ADMIN — ATORVASTATIN CALCIUM 20 MILLIGRAM(S): 80 TABLET, FILM COATED ORAL at 21:50

## 2024-06-13 RX ADMIN — Medication 500 MILLILITER(S): at 18:57

## 2024-06-13 RX ADMIN — Medication 1000 MILLIGRAM(S): at 22:50

## 2024-06-13 RX ADMIN — SODIUM CHLORIDE 125 MILLILITER(S): 9 INJECTION, SOLUTION INTRAVENOUS at 19:30

## 2024-06-14 ENCOUNTER — TRANSCRIPTION ENCOUNTER (OUTPATIENT)
Age: 79
End: 2024-06-14

## 2024-06-14 LAB
ANION GAP SERPL CALC-SCNC: 6 MMOL/L — SIGNIFICANT CHANGE UP (ref 5–17)
BUN SERPL-MCNC: 16 MG/DL — SIGNIFICANT CHANGE UP (ref 7–23)
CALCIUM SERPL-MCNC: 8.9 MG/DL — SIGNIFICANT CHANGE UP (ref 8.5–10.1)
CHLORIDE SERPL-SCNC: 105 MMOL/L — SIGNIFICANT CHANGE UP (ref 96–108)
CO2 SERPL-SCNC: 25 MMOL/L — SIGNIFICANT CHANGE UP (ref 22–31)
CREAT SERPL-MCNC: 0.69 MG/DL — SIGNIFICANT CHANGE UP (ref 0.5–1.3)
EGFR: 89 ML/MIN/1.73M2 — SIGNIFICANT CHANGE UP
EGFR: 89 ML/MIN/1.73M2 — SIGNIFICANT CHANGE UP
GLUCOSE SERPL-MCNC: 124 MG/DL — HIGH (ref 70–99)
GRAM STN FLD: SIGNIFICANT CHANGE UP
HCT VFR BLD CALC: 29.9 % — LOW (ref 34.5–45)
HGB BLD-MCNC: 9.6 G/DL — LOW (ref 11.5–15.5)
MCHC RBC-ENTMCNC: 28.1 PG — SIGNIFICANT CHANGE UP (ref 27–34)
MCHC RBC-ENTMCNC: 32.1 G/DL — SIGNIFICANT CHANGE UP (ref 32–36)
MCV RBC AUTO: 87.4 FL — SIGNIFICANT CHANGE UP (ref 80–100)
NRBC # BLD: 0 /100 WBCS — SIGNIFICANT CHANGE UP (ref 0–0)
NRBC BLD-RTO: 0 /100 WBCS — SIGNIFICANT CHANGE UP (ref 0–0)
PLATELET # BLD AUTO: 163 K/UL — SIGNIFICANT CHANGE UP (ref 150–400)
POTASSIUM SERPL-MCNC: 3.8 MMOL/L — SIGNIFICANT CHANGE UP (ref 3.5–5.3)
POTASSIUM SERPL-SCNC: 3.8 MMOL/L — SIGNIFICANT CHANGE UP (ref 3.5–5.3)
RBC # BLD: 3.42 M/UL — LOW (ref 3.8–5.2)
RBC # FLD: 12.6 % — SIGNIFICANT CHANGE UP (ref 10.3–14.5)
SODIUM SERPL-SCNC: 136 MMOL/L — SIGNIFICANT CHANGE UP (ref 135–145)
SPECIMEN SOURCE: SIGNIFICANT CHANGE UP
WBC # BLD: 7.44 K/UL — SIGNIFICANT CHANGE UP (ref 3.8–10.5)
WBC # FLD AUTO: 7.44 K/UL — SIGNIFICANT CHANGE UP (ref 3.8–10.5)

## 2024-06-14 RX ORDER — METOPROLOL SUCCINATE 50 MG/1
1.5 TABLET, EXTENDED RELEASE ORAL
Refills: 0 | DISCHARGE

## 2024-06-14 RX ORDER — MINOXIDIL 3 G/60ML
1 SOLUTION TOPICAL
Qty: 0 | Refills: 0 | DISCHARGE

## 2024-06-14 RX ORDER — LORAZEPAM 4 MG/ML
0.5 VIAL (ML) INJECTION EVERY 6 HOURS
Refills: 0 | Status: DISCONTINUED | OUTPATIENT
Start: 2024-06-14 | End: 2024-06-15

## 2024-06-14 RX ORDER — NALOXONE HYDROCHLORIDE 0.4 MG/ML
4 INJECTION, SOLUTION INTRAMUSCULAR; INTRAVENOUS; SUBCUTANEOUS
Qty: 1 | Refills: 0
Start: 2024-06-14 | End: 2024-06-14

## 2024-06-14 RX ORDER — CHOLECALCIFEROL (VITAMIN D3) 125 MCG
1 CAPSULE ORAL
Qty: 0 | Refills: 0 | DISCHARGE

## 2024-06-14 RX ORDER — RIVAROXABAN 10 MG/1
1 TABLET, FILM COATED ORAL
Qty: 30 | Refills: 0
Start: 2024-06-14 | End: 2024-07-13

## 2024-06-14 RX ORDER — ALPRAZOLAM 0.5 MG
0.25 TABLET, EXTENDED RELEASE 24 HR ORAL ONCE
Refills: 0 | Status: DISCONTINUED | OUTPATIENT
Start: 2024-06-14 | End: 2024-06-14

## 2024-06-14 RX ORDER — TRAMADOL HYDROCHLORIDE 50 MG/1
1 TABLET, FILM COATED ORAL
Qty: 42 | Refills: 0
Start: 2024-06-14 | End: 2024-06-20

## 2024-06-14 RX ORDER — ONDANSETRON HCL/PF 4 MG/2 ML
4 VIAL (ML) INJECTION EVERY 4 HOURS
Refills: 0 | Status: DISCONTINUED | OUTPATIENT
Start: 2024-06-14 | End: 2024-06-15

## 2024-06-14 RX ORDER — ESOMEPRAZOLE MAGNESIUM 40 MG/1
1 CAPSULE, DELAYED RELEASE ORAL
Qty: 0 | Refills: 0 | DISCHARGE

## 2024-06-14 RX ORDER — DOCUSATE SODIUM 100 MG
1 CAPSULE ORAL
Qty: 28 | Refills: 0
Start: 2024-06-14 | End: 2024-06-27

## 2024-06-14 RX ADMIN — FUROSEMIDE 20 MILLIGRAM(S): 10 INJECTION INTRAMUSCULAR; INTRAVENOUS at 05:48

## 2024-06-14 RX ADMIN — Medication 100 MILLIGRAM(S): at 07:59

## 2024-06-14 RX ADMIN — OXYCODONE HYDROCHLORIDE 10 MILLIGRAM(S): 30 TABLET ORAL at 22:01

## 2024-06-14 RX ADMIN — Medication 40 MILLIGRAM(S): at 05:48

## 2024-06-14 RX ADMIN — Medication 2 TABLET(S): at 21:11

## 2024-06-14 RX ADMIN — OXYCODONE HYDROCHLORIDE 10 MILLIGRAM(S): 30 TABLET ORAL at 12:38

## 2024-06-14 RX ADMIN — POLYETHYLENE GLYCOL 3350 17 GRAM(S): 17 POWDER, FOR SOLUTION ORAL at 21:11

## 2024-06-14 RX ADMIN — Medication 3 MILLILITER(S): at 06:33

## 2024-06-14 RX ADMIN — RIVAROXABAN 10 MILLIGRAM(S): 10 TABLET, FILM COATED ORAL at 11:39

## 2024-06-14 RX ADMIN — BUTALBITAL, ACETAMINOPHEN AND CAFFEINE 1 TABLET(S): 50; 325; 40 TABLET ORAL at 07:06

## 2024-06-14 RX ADMIN — Medication 1 TABLET(S): at 11:39

## 2024-06-14 RX ADMIN — Medication 0.25 MILLIGRAM(S): at 04:32

## 2024-06-14 RX ADMIN — Medication 125 MILLILITER(S): at 05:47

## 2024-06-14 RX ADMIN — OXYCODONE HYDROCHLORIDE 10 MILLIGRAM(S): 30 TABLET ORAL at 08:45

## 2024-06-14 RX ADMIN — OXYCODONE HYDROCHLORIDE 10 MILLIGRAM(S): 30 TABLET ORAL at 21:11

## 2024-06-14 RX ADMIN — OXYCODONE HYDROCHLORIDE 10 MILLIGRAM(S): 30 TABLET ORAL at 03:25

## 2024-06-14 RX ADMIN — Medication 0.5 MILLIGRAM(S): at 22:34

## 2024-06-14 RX ADMIN — Medication 4 MILLIGRAM(S): at 07:59

## 2024-06-14 RX ADMIN — DEXAMETHASONE 102 MILLIGRAM(S): 0.5 TABLET ORAL at 05:46

## 2024-06-14 RX ADMIN — Medication 0.5 MILLIGRAM(S): at 07:59

## 2024-06-14 RX ADMIN — Medication 3 MILLILITER(S): at 21:39

## 2024-06-14 RX ADMIN — OXYCODONE HYDROCHLORIDE 5 MILLIGRAM(S): 30 TABLET ORAL at 00:39

## 2024-06-14 RX ADMIN — OXYCODONE HYDROCHLORIDE 10 MILLIGRAM(S): 30 TABLET ORAL at 07:59

## 2024-06-14 RX ADMIN — OXYCODONE HYDROCHLORIDE 10 MILLIGRAM(S): 30 TABLET ORAL at 13:30

## 2024-06-14 RX ADMIN — ATORVASTATIN CALCIUM 20 MILLIGRAM(S): 80 TABLET, FILM COATED ORAL at 21:12

## 2024-06-14 RX ADMIN — BUTALBITAL, ACETAMINOPHEN AND CAFFEINE 1 TABLET(S): 50; 325; 40 TABLET ORAL at 06:08

## 2024-06-14 RX ADMIN — Medication 3 MILLILITER(S): at 13:31

## 2024-06-14 RX ADMIN — OXYCODONE HYDROCHLORIDE 10 MILLIGRAM(S): 30 TABLET ORAL at 04:25

## 2024-06-15 ENCOUNTER — TRANSCRIPTION ENCOUNTER (OUTPATIENT)
Age: 79
End: 2024-06-15

## 2024-06-15 VITALS
OXYGEN SATURATION: 97 % | DIASTOLIC BLOOD PRESSURE: 79 MMHG | HEART RATE: 95 BPM | TEMPERATURE: 100 F | RESPIRATION RATE: 18 BRPM | SYSTOLIC BLOOD PRESSURE: 118 MMHG

## 2024-06-15 PROCEDURE — 93010 ELECTROCARDIOGRAM REPORT: CPT

## 2024-06-15 RX ADMIN — Medication 0.5 MILLIGRAM(S): at 14:24

## 2024-06-15 RX ADMIN — Medication 3 MILLILITER(S): at 14:28

## 2024-06-15 RX ADMIN — FUROSEMIDE 20 MILLIGRAM(S): 10 INJECTION INTRAMUSCULAR; INTRAVENOUS at 06:16

## 2024-06-15 RX ADMIN — Medication 1 TABLET(S): at 12:00

## 2024-06-15 RX ADMIN — OXYCODONE HYDROCHLORIDE 5 MILLIGRAM(S): 30 TABLET ORAL at 07:12

## 2024-06-15 RX ADMIN — RIVAROXABAN 10 MILLIGRAM(S): 10 TABLET, FILM COATED ORAL at 12:00

## 2024-06-15 RX ADMIN — METOPROLOL SUCCINATE 50 MILLIGRAM(S): 50 TABLET, EXTENDED RELEASE ORAL at 12:00

## 2024-06-15 RX ADMIN — Medication 3 MILLILITER(S): at 06:51

## 2024-06-15 RX ADMIN — Medication 40 MILLIGRAM(S): at 06:16

## 2024-06-15 RX ADMIN — OXYCODONE HYDROCHLORIDE 5 MILLIGRAM(S): 30 TABLET ORAL at 06:36

## 2024-06-17 ENCOUNTER — NON-APPOINTMENT (OUTPATIENT)
Age: 79
End: 2024-06-17

## 2024-06-17 LAB — SURGICAL PATHOLOGY STUDY: SIGNIFICANT CHANGE UP

## 2024-06-19 LAB
CULTURE RESULTS: SIGNIFICANT CHANGE UP
GRAM STN FLD: SIGNIFICANT CHANGE UP
SPECIMEN SOURCE: SIGNIFICANT CHANGE UP

## 2024-06-20 ENCOUNTER — TRANSCRIPTION ENCOUNTER (OUTPATIENT)
Age: 79
End: 2024-06-20

## 2024-06-22 ENCOUNTER — NON-APPOINTMENT (OUTPATIENT)
Age: 79
End: 2024-06-22
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
## 2024-06-24 DIAGNOSIS — T84.031A MECHANICAL LOOSENING OF INTERNAL LEFT HIP PROSTHETIC JOINT, INITIAL ENCOUNTER: ICD-10-CM

## 2024-06-24 DIAGNOSIS — Z88.8 ALLERGY STATUS TO OTHER DRUGS, MEDICAMENTS AND BIOLOGICAL SUBSTANCES: ICD-10-CM

## 2024-06-24 DIAGNOSIS — K44.9 DIAPHRAGMATIC HERNIA WITHOUT OBSTRUCTION OR GANGRENE: ICD-10-CM

## 2024-06-24 DIAGNOSIS — M51.36 OTHER INTERVERTEBRAL DISC DEGENERATION, LUMBAR REGION: ICD-10-CM

## 2024-06-24 DIAGNOSIS — R73.03 PREDIABETES: ICD-10-CM

## 2024-06-24 DIAGNOSIS — Z98.51 TUBAL LIGATION STATUS: ICD-10-CM

## 2024-06-24 DIAGNOSIS — Z88.1 ALLERGY STATUS TO OTHER ANTIBIOTIC AGENTS: ICD-10-CM

## 2024-06-24 DIAGNOSIS — F41.1 GENERALIZED ANXIETY DISORDER: ICD-10-CM

## 2024-06-24 DIAGNOSIS — G43.909 MIGRAINE, UNSPECIFIED, NOT INTRACTABLE, WITHOUT STATUS MIGRAINOSUS: ICD-10-CM

## 2024-06-24 DIAGNOSIS — K21.9 GASTRO-ESOPHAGEAL REFLUX DISEASE WITHOUT ESOPHAGITIS: ICD-10-CM

## 2024-06-24 DIAGNOSIS — I25.10 ATHEROSCLEROTIC HEART DISEASE OF NATIVE CORONARY ARTERY WITHOUT ANGINA PECTORIS: ICD-10-CM

## 2024-06-24 DIAGNOSIS — Z96.651 PRESENCE OF RIGHT ARTIFICIAL KNEE JOINT: ICD-10-CM

## 2024-06-24 DIAGNOSIS — L65.9 NONSCARRING HAIR LOSS, UNSPECIFIED: ICD-10-CM

## 2024-06-24 DIAGNOSIS — G47.00 INSOMNIA, UNSPECIFIED: ICD-10-CM

## 2024-06-24 DIAGNOSIS — I10 ESSENTIAL (PRIMARY) HYPERTENSION: ICD-10-CM

## 2024-06-24 DIAGNOSIS — E78.5 HYPERLIPIDEMIA, UNSPECIFIED: ICD-10-CM

## 2024-06-24 DIAGNOSIS — K58.9 IRRITABLE BOWEL SYNDROME, UNSPECIFIED: ICD-10-CM

## 2024-06-24 DIAGNOSIS — M81.0 AGE-RELATED OSTEOPOROSIS WITHOUT CURRENT PATHOLOGICAL FRACTURE: ICD-10-CM

## 2024-06-24 DIAGNOSIS — Z88.5 ALLERGY STATUS TO NARCOTIC AGENT: ICD-10-CM

## 2024-06-26 ENCOUNTER — NON-APPOINTMENT (OUTPATIENT)
Age: 79
End: 2024-06-26

## 2024-07-01 ENCOUNTER — APPOINTMENT (OUTPATIENT)
Dept: ORTHOPEDIC SURGERY | Facility: CLINIC | Age: 79
End: 2024-07-01
Payer: MEDICARE

## 2024-07-01 VITALS — BODY MASS INDEX: 29.25 KG/M2 | HEIGHT: 66 IN | WEIGHT: 182 LBS

## 2024-07-01 DIAGNOSIS — Z96.649 PRESENCE OF UNSPECIFIED ARTIFICIAL HIP JOINT: ICD-10-CM

## 2024-07-01 DIAGNOSIS — Z96.642 PRESENCE OF LEFT ARTIFICIAL HIP JOINT: ICD-10-CM

## 2024-07-01 PROBLEM — K86.2 PANCREAS CYST: Status: ACTIVE | Noted: 2024-07-01

## 2024-07-01 PROCEDURE — 73503 X-RAY EXAM HIP UNI 4/> VIEWS: CPT | Mod: LT

## 2024-07-01 PROCEDURE — 99024 POSTOP FOLLOW-UP VISIT: CPT

## 2024-07-09 ENCOUNTER — APPOINTMENT (OUTPATIENT)
Dept: FAMILY MEDICINE | Facility: CLINIC | Age: 79
End: 2024-07-09
Payer: MEDICARE

## 2024-07-09 VITALS
HEART RATE: 72 BPM | RESPIRATION RATE: 14 BRPM | OXYGEN SATURATION: 100 % | WEIGHT: 175 LBS | SYSTOLIC BLOOD PRESSURE: 120 MMHG | HEIGHT: 66 IN | BODY MASS INDEX: 28.12 KG/M2 | TEMPERATURE: 98.2 F | DIASTOLIC BLOOD PRESSURE: 70 MMHG

## 2024-07-09 VITALS — DIASTOLIC BLOOD PRESSURE: 66 MMHG | SYSTOLIC BLOOD PRESSURE: 116 MMHG

## 2024-07-09 DIAGNOSIS — I77.810 THORACIC AORTIC ECTASIA: ICD-10-CM

## 2024-07-09 DIAGNOSIS — Q44.79 OTHER CONGENITAL MALFORMATIONS OF LIVER: ICD-10-CM

## 2024-07-09 DIAGNOSIS — K86.2 CYST OF PANCREAS: ICD-10-CM

## 2024-07-09 DIAGNOSIS — Z82.49 FAMILY HISTORY OF ISCHEMIC HEART DISEASE AND OTHER DISEASES OF THE CIRCULATORY SYSTEM: ICD-10-CM

## 2024-07-09 DIAGNOSIS — I10 ESSENTIAL (PRIMARY) HYPERTENSION: ICD-10-CM

## 2024-07-09 DIAGNOSIS — E78.00 PURE HYPERCHOLESTEROLEMIA, UNSPECIFIED: ICD-10-CM

## 2024-07-09 DIAGNOSIS — R91.1 SOLITARY PULMONARY NODULE: ICD-10-CM

## 2024-07-09 DIAGNOSIS — F41.1 GENERALIZED ANXIETY DISORDER: ICD-10-CM

## 2024-07-09 DIAGNOSIS — R10.12 LEFT UPPER QUADRANT PAIN: ICD-10-CM

## 2024-07-09 DIAGNOSIS — R53.83 OTHER FATIGUE: ICD-10-CM

## 2024-07-09 PROCEDURE — G2211 COMPLEX E/M VISIT ADD ON: CPT

## 2024-07-09 PROCEDURE — 99214 OFFICE O/P EST MOD 30 MIN: CPT

## 2024-07-09 RX ORDER — METHYLCELLULOSE 2 G/10.2G
POWDER, FOR SOLUTION ORAL
Refills: 0 | Status: ACTIVE | COMMUNITY

## 2024-07-09 RX ORDER — METOPROLOL SUCCINATE 25 MG/1
25 TABLET, EXTENDED RELEASE ORAL TWICE DAILY
Refills: 0 | Status: ACTIVE | COMMUNITY

## 2024-07-17 ENCOUNTER — NON-APPOINTMENT (OUTPATIENT)
Age: 79
End: 2024-07-17

## 2024-07-17 ENCOUNTER — APPOINTMENT (OUTPATIENT)
Dept: OPHTHALMOLOGY | Facility: CLINIC | Age: 79
End: 2024-07-17
Payer: MEDICARE

## 2024-07-17 PROCEDURE — 92014 COMPRE OPH EXAM EST PT 1/>: CPT

## 2024-07-23 RX ORDER — DICLOFENAC SODIUM 1% 10 MG/G
1 GEL TOPICAL
Qty: 1 | Refills: 5 | Status: ACTIVE | COMMUNITY
Start: 2024-07-23 | End: 1900-01-01

## 2024-07-25 ENCOUNTER — NON-APPOINTMENT (OUTPATIENT)
Age: 79
End: 2024-07-25

## 2024-08-05 ENCOUNTER — APPOINTMENT (OUTPATIENT)
Dept: ORTHOPEDIC SURGERY | Facility: CLINIC | Age: 79
End: 2024-08-05

## 2024-08-05 PROCEDURE — 99024 POSTOP FOLLOW-UP VISIT: CPT

## 2024-08-05 NOTE — DISCUSSION/SUMMARY
[de-identified] : The patient was advised of the diagnosis.  The natural history of the pathology was explained in full to the patient in layman's terms. All questions were answered.  The risks and benefits of surgical and non-surgical treatment alternatives were explained in full to the patient.  Entered by Christine Philippe acting as a scribe.

## 2024-08-05 NOTE — ASSESSMENT
[FreeTextEntry1] : 4/29/24: Pt with hx of L DARREN rev in 2018 by Dr. 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 Discussed her best and only option is revision- explained the more surgery is delayed- she is at increased risk of fx. however this is not an emergency surgery.  She is well informed and would like to proceed with L DARREN revision femoral component reclaim monoblock and modular, with RUDOLPH. Will obtain b/w to r/o infection. Answered all pt questions at length.  Patient does have anterior thigh pain that she is since before her primary surgery which is likely related to the stem and loosening but I cannot be 100% sure this could be lumbar disease as well.  I am hoping with the revision surgery this pain will alleviate but she understands that it may persist and if so we will need to work it up separately.  I am also going to better evaluate her psoas tendon and her cuff alignment to make sure that this is not the underlying cause 7/1/24: 2.5 weeks s/p L DARREN revision. She had iron infusion done- reassured her that she had anemia as she had surgery. Discussed medications. She will discuss her metoprolol dosage with her PCP. Begin outpatient PT. f/up 6 weeks and repeat XR   8/5/24: 7 weeks s/p L DARREN rev. XR look good. She continues to improve and has less pain than prior to surgery. Continue with course of physical therapy until finished. Follow up 6 weeks

## 2024-08-05 NOTE — HISTORY OF PRESENT ILLNESS
[de-identified] : 8/5/24: PO #2. 7 weeks s/p L DARREN rev. Reports improvement, some pain but overall doing well. Doing PT- feels it is beneficial, making progression. Some difficulty with stairs. States her R TKA has been bothering her more recently- states it feels very tight and unstable. Cane.   Prev doc: 4/29/24: 79F with LT hip pain x 3 months. No specific injury/trauma. Hx of DARREN press fit in 2018 that subsided- then revised in June 2018 to a cemented stem by Dr. Grullon. Did have relief after the revision but still had persistent thigh pain. Saw Dr. Grullon in March and was told there was lateral movement of femoral stem and needed revision.  Most pain in the groin, anterior thigh, and laterally with sit to stand. Sig startup pain- settles once she starts walking. Difficulty with stairs. Hx of R TKA in March 2023- states it is doing okay, does have stiffness and aches. Ices the knee frequently. Ambulates without assistance. Reports a sig amount of stress in her personal life in the past year.  7/1/24: 2.5 weeks s/p L DARREN revision. States she had anemia and was given an iron transfusion for this. Denies hx of anemia. No longer taking narcotics   Hx of open heart surgery in 2022   [de-identified] : Therapy

## 2024-08-05 NOTE — PHYSICAL EXAM
[Left] : left hip with pelvis [Components well fixed, in good position] : Components well fixed, in good position [de-identified] : LEFT HIP Incision is clean and dry  NVI 5/5 strength no pain with ROM +1 edema  RIGHT KNEE R TKA incision well healed ROM 0-120 mild effusion tenderness at the pes some mild global laxity  Amb with cane

## 2024-08-15 ENCOUNTER — RESULT REVIEW (OUTPATIENT)
Age: 79
End: 2024-08-15

## 2024-08-20 ENCOUNTER — NON-APPOINTMENT (OUTPATIENT)
Age: 79
End: 2024-08-20

## 2024-09-06 ENCOUNTER — NON-APPOINTMENT (OUTPATIENT)
Age: 79
End: 2024-09-06

## 2024-09-06 DIAGNOSIS — M48.061 SPINAL STENOSIS, LUMBAR REGION WITHOUT NEUROGENIC CLAUDICATION: ICD-10-CM

## 2024-09-06 RX ORDER — PREDNISONE 10 MG/1
10 TABLET ORAL
Qty: 18 | Refills: 0 | Status: ACTIVE | COMMUNITY
Start: 2024-09-06 | End: 1900-01-01

## 2024-09-10 ENCOUNTER — NON-APPOINTMENT (OUTPATIENT)
Age: 79
End: 2024-09-10

## 2024-09-16 ENCOUNTER — APPOINTMENT (OUTPATIENT)
Dept: ORTHOPEDIC SURGERY | Facility: CLINIC | Age: 79
End: 2024-09-16

## 2024-09-30 ENCOUNTER — APPOINTMENT (OUTPATIENT)
Dept: ORTHOPEDIC SURGERY | Facility: CLINIC | Age: 79
End: 2024-09-30
Payer: MEDICARE

## 2024-09-30 DIAGNOSIS — T84.098A OTHER MECHANICAL COMPLICATION OF OTHER INTERNAL JOINT PROSTHESIS, INITIAL ENCOUNTER: ICD-10-CM

## 2024-09-30 DIAGNOSIS — Z96.659 OTHER MECHANICAL COMPLICATION OF OTHER INTERNAL JOINT PROSTHESIS, INITIAL ENCOUNTER: ICD-10-CM

## 2024-09-30 DIAGNOSIS — Z96.642 PRESENCE OF LEFT ARTIFICIAL HIP JOINT: ICD-10-CM

## 2024-09-30 PROCEDURE — 99213 OFFICE O/P EST LOW 20 MIN: CPT

## 2024-09-30 PROCEDURE — 73503 X-RAY EXAM HIP UNI 4/> VIEWS: CPT | Mod: LT

## 2024-09-30 PROCEDURE — 73562 X-RAY EXAM OF KNEE 3: CPT | Mod: RT

## 2024-09-30 NOTE — DISCUSSION/SUMMARY
[de-identified] : The patient was advised of the diagnosis.  The natural history of the pathology was explained in full to the patient in layman's terms. All questions were answered.  The risks and benefits of surgical and non-surgical treatment alternatives were explained in full to the patient.

## 2024-09-30 NOTE — HISTORY OF PRESENT ILLNESS
[de-identified] : 9/30/24: Over 3 months s/p L DARREN rev.  Better than preop but still some groin pain with hip flexion and lifting leg.  Concerned about her right TKA in 2023 that still gives her pain.  Prev doc: 4/29/24: 79F with LT hip pain x 3 months. No specific injury/trauma. Hx of DARREN press fit in 2018 that subsided- then revised in June 2018 to a cemented stem by Dr. Grullon. Did have relief after the revision but still had persistent thigh pain. Saw Dr. Grullon in March and was told there was lateral movement of femoral stem and needed revision.  Most pain in the groin, anterior thigh, and laterally with sit to stand. Sig startup pain- settles once she starts walking. Difficulty with stairs. Hx of R TKA in March 2023- states it is doing okay, does have stiffness and aches. Ices the knee frequently. Ambulates without assistance. Reports a sig amount of stress in her personal life in the past year.  7/1/24: 2.5 weeks s/p L DARREN revision. States she had anemia and was given an iron transfusion for this. Denies hx of anemia. No longer taking narcotics  8/5/24: PO #2. 7 weeks s/p L DARREN rev. Reports improvement, some pain but overall doing well. Doing PT- feels it is beneficial, making progression. Some difficulty with stairs. States her R TKA has been bothering her more recently- states it feels very tight and unstable. Cane.   Hx of open heart surgery in 2022

## 2024-09-30 NOTE — ASSESSMENT
[FreeTextEntry1] : Previous doc: 4/29/24: Pt with hx of L DARREN rev in 2018 by Dr. 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 Discussed her best and only option is revision- explained the more surgery is delayed- she is at increased risk of fx. however this is not an emergency surgery.  She is well informed and would like to proceed with L DARREN revision femoral component reclaim monoblock and modular, with RUDOLPH. Will obtain b/w to r/o infection. Answered all pt questions at length.  Patient does have anterior thigh pain that she is since before her primary surgery which is likely related to the stem and loosening but I cannot be 100% sure this could be lumbar disease as well.  I am hoping with the revision surgery this pain will alleviate but she understands that it may persist and if so we will need to work it up separately.  I am also going to better evaluate her psoas tendon and her cuff alignment to make sure that this is not the underlying cause 7/1/24: 2.5 weeks s/p L DARREN revision. She had iron infusion done- reassured her that she had anemia as she had surgery. Discussed medications. She will discuss her metoprolol dosage with her PCP. Begin outpatient PT. f/up 6 weeks and repeat XR  8/5/24: 7 weeks s/p L DARREN rev. XR look good. She continues to improve and has less pain than prior to surgery. Continue with course of physical therapy until finished. Follow up 6 weeks  9/30/24: Left DARREN revision doing well.  Still some psoas pain which should improve over time.  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

## 2024-09-30 NOTE — PHYSICAL EXAM
[Left] : left hip with pelvis [Right] : right knee [AP] : anteroposterior [Lateral] : lateral [[place]] : [place]yline [Components well fixed, in good position] : Components well fixed, in good position [de-identified] : LEFT HIP Incision is healed NVI 5/5 strength no pain with ROM  RIGHT KNEE R TKA incision well healed ROM 0-120 mild effusion tenderness at the pes some mild global laxity  Amb with cane

## 2024-10-09 ENCOUNTER — APPOINTMENT (OUTPATIENT)
Dept: FAMILY MEDICINE | Facility: CLINIC | Age: 79
End: 2024-10-09

## 2024-10-09 ENCOUNTER — APPOINTMENT (OUTPATIENT)
Dept: FAMILY MEDICINE | Facility: CLINIC | Age: 79
End: 2024-10-09
Payer: MEDICARE

## 2024-10-09 VITALS
BODY MASS INDEX: 29.73 KG/M2 | WEIGHT: 185 LBS | HEART RATE: 68 BPM | SYSTOLIC BLOOD PRESSURE: 118 MMHG | HEIGHT: 66 IN | OXYGEN SATURATION: 97 % | DIASTOLIC BLOOD PRESSURE: 70 MMHG

## 2024-10-09 DIAGNOSIS — Z23 ENCOUNTER FOR IMMUNIZATION: ICD-10-CM

## 2024-10-09 DIAGNOSIS — R91.1 SOLITARY PULMONARY NODULE: ICD-10-CM

## 2024-10-09 DIAGNOSIS — E78.00 PURE HYPERCHOLESTEROLEMIA, UNSPECIFIED: ICD-10-CM

## 2024-10-09 DIAGNOSIS — I10 ESSENTIAL (PRIMARY) HYPERTENSION: ICD-10-CM

## 2024-10-09 DIAGNOSIS — G44.221 CHRONIC TENSION-TYPE HEADACHE, INTRACTABLE: ICD-10-CM

## 2024-10-09 DIAGNOSIS — M54.9 DORSALGIA, UNSPECIFIED: ICD-10-CM

## 2024-10-09 PROCEDURE — 99214 OFFICE O/P EST MOD 30 MIN: CPT

## 2024-10-09 PROCEDURE — 90662 IIV NO PRSV INCREASED AG IM: CPT

## 2024-10-09 PROCEDURE — G2211 COMPLEX E/M VISIT ADD ON: CPT

## 2024-10-09 PROCEDURE — G0008: CPT

## 2024-11-18 ENCOUNTER — APPOINTMENT (OUTPATIENT)
Dept: ORTHOPEDIC SURGERY | Facility: CLINIC | Age: 79
End: 2024-11-18
Payer: MEDICARE

## 2024-11-18 DIAGNOSIS — Z96.649 PRESENCE OF UNSPECIFIED ARTIFICIAL HIP JOINT: ICD-10-CM

## 2024-11-18 DIAGNOSIS — Z96.642 PRESENCE OF LEFT ARTIFICIAL HIP JOINT: ICD-10-CM

## 2024-11-18 PROCEDURE — 99214 OFFICE O/P EST MOD 30 MIN: CPT

## 2024-11-18 PROCEDURE — 73503 X-RAY EXAM HIP UNI 4/> VIEWS: CPT | Mod: LT

## 2024-11-26 ENCOUNTER — NON-APPOINTMENT (OUTPATIENT)
Age: 79
End: 2024-11-26

## 2024-12-02 ENCOUNTER — NON-APPOINTMENT (OUTPATIENT)
Age: 79
End: 2024-12-02

## 2024-12-10 ENCOUNTER — APPOINTMENT (OUTPATIENT)
Dept: FAMILY MEDICINE | Facility: CLINIC | Age: 79
End: 2024-12-10
Payer: MEDICARE

## 2024-12-10 VITALS
BODY MASS INDEX: 29.73 KG/M2 | DIASTOLIC BLOOD PRESSURE: 80 MMHG | SYSTOLIC BLOOD PRESSURE: 120 MMHG | WEIGHT: 185 LBS | TEMPERATURE: 98.1 F | OXYGEN SATURATION: 97 % | HEIGHT: 66 IN | HEART RATE: 76 BPM

## 2024-12-10 DIAGNOSIS — K59.09 OTHER CONSTIPATION: ICD-10-CM

## 2024-12-10 DIAGNOSIS — R00.2 PALPITATIONS: ICD-10-CM

## 2024-12-10 DIAGNOSIS — R20.2 ANESTHESIA OF SKIN: ICD-10-CM

## 2024-12-10 DIAGNOSIS — R20.0 ANESTHESIA OF SKIN: ICD-10-CM

## 2024-12-10 PROCEDURE — 99214 OFFICE O/P EST MOD 30 MIN: CPT

## 2024-12-10 PROCEDURE — G2211 COMPLEX E/M VISIT ADD ON: CPT

## 2024-12-17 ENCOUNTER — APPOINTMENT (OUTPATIENT)
Dept: PULMONOLOGY | Facility: CLINIC | Age: 79
End: 2024-12-17
Payer: MEDICARE

## 2024-12-17 PROCEDURE — 94727 GAS DIL/WSHOT DETER LNG VOL: CPT

## 2024-12-17 PROCEDURE — 94010 BREATHING CAPACITY TEST: CPT

## 2024-12-17 PROCEDURE — 94729 DIFFUSING CAPACITY: CPT

## 2024-12-23 ENCOUNTER — NON-APPOINTMENT (OUTPATIENT)
Age: 79
End: 2024-12-23

## 2025-01-02 NOTE — BRIEF OPERATIVE NOTE - PRIMARY SURGEON
Requesting refill on:   oxyCODONE-acetaminophen (PERCOCET)  MG per tablet #180- due to fill on Monday  Please call into [facility] d[facility]not [phone]. Pt is reachable at [phone].    
Michael Wade MD
Quality 431: Preventive Care And Screening: Unhealthy Alcohol Use - Screening: Patient screened for unhealthy alcohol use using a single question and scores less than 2 times per year
Quality 130: Documentation Of Current Medications In The Medical Record: Current Medications Documented
Quality 226: Preventive Care And Screening: Tobacco Use: Screening And Cessation Intervention: Patient screened for tobacco use and is an ex/non-smoker
Detail Level: Detailed

## 2025-01-08 ENCOUNTER — APPOINTMENT (OUTPATIENT)
Dept: FAMILY MEDICINE | Facility: CLINIC | Age: 80
End: 2025-01-08
Payer: MEDICARE

## 2025-01-08 VITALS
RESPIRATION RATE: 14 BRPM | HEART RATE: 74 BPM | HEIGHT: 66 IN | WEIGHT: 185 LBS | DIASTOLIC BLOOD PRESSURE: 78 MMHG | BODY MASS INDEX: 29.73 KG/M2 | OXYGEN SATURATION: 95 % | SYSTOLIC BLOOD PRESSURE: 126 MMHG

## 2025-01-08 DIAGNOSIS — G43.909 MIGRAINE, UNSPECIFIED, NOT INTRACTABLE, W/OUT STATUS MIGRAINOSUS: ICD-10-CM

## 2025-01-08 DIAGNOSIS — I10 ESSENTIAL (PRIMARY) HYPERTENSION: ICD-10-CM

## 2025-01-08 DIAGNOSIS — R91.1 SOLITARY PULMONARY NODULE: ICD-10-CM

## 2025-01-08 PROCEDURE — G2211 COMPLEX E/M VISIT ADD ON: CPT

## 2025-01-08 PROCEDURE — 99214 OFFICE O/P EST MOD 30 MIN: CPT

## 2025-01-08 RX ORDER — FAMOTIDINE 10 MG/1
10 TABLET, FILM COATED ORAL
Refills: 0 | Status: ACTIVE | COMMUNITY

## 2025-02-10 ENCOUNTER — APPOINTMENT (OUTPATIENT)
Dept: FAMILY MEDICINE | Facility: CLINIC | Age: 80
End: 2025-02-10
Payer: MEDICARE

## 2025-02-10 VITALS
SYSTOLIC BLOOD PRESSURE: 120 MMHG | OXYGEN SATURATION: 96 % | HEIGHT: 66 IN | WEIGHT: 187 LBS | TEMPERATURE: 98.7 F | RESPIRATION RATE: 14 BRPM | BODY MASS INDEX: 30.05 KG/M2 | DIASTOLIC BLOOD PRESSURE: 80 MMHG | HEART RATE: 76 BPM

## 2025-02-10 DIAGNOSIS — J06.9 ACUTE UPPER RESPIRATORY INFECTION, UNSPECIFIED: ICD-10-CM

## 2025-02-10 DIAGNOSIS — R10.12 LEFT UPPER QUADRANT PAIN: ICD-10-CM

## 2025-02-10 PROCEDURE — G2211 COMPLEX E/M VISIT ADD ON: CPT

## 2025-02-10 PROCEDURE — 99214 OFFICE O/P EST MOD 30 MIN: CPT

## 2025-02-11 LAB
INFLUENZA A RESULT: NOT DETECTED
INFLUENZA B RESULT: NOT DETECTED
RESP SYN VIRUS RESULT: NOT DETECTED
SARS-COV-2 RESULT: NOT DETECTED

## 2025-03-01 PROBLEM — J01.90 ACUTE NON-RECURRENT SINUSITIS, UNSPECIFIED LOCATION: Status: ACTIVE | Noted: 2025-03-01 | Resolved: 2025-03-31

## 2025-03-01 RX ORDER — AZITHROMYCIN 250 MG/1
250 TABLET, FILM COATED ORAL
Qty: 1 | Refills: 0 | Status: DISCONTINUED | COMMUNITY
Start: 2025-02-18 | End: 2025-03-01

## 2025-03-01 RX ORDER — DOXYCYCLINE 100 MG/1
100 CAPSULE ORAL
Qty: 20 | Refills: 0 | Status: ACTIVE | COMMUNITY
Start: 2025-03-01 | End: 1900-01-01

## 2025-03-03 ENCOUNTER — APPOINTMENT (OUTPATIENT)
Dept: FAMILY MEDICINE | Facility: CLINIC | Age: 80
End: 2025-03-03

## 2025-03-03 ENCOUNTER — APPOINTMENT (OUTPATIENT)
Dept: ORTHOPEDIC SURGERY | Facility: CLINIC | Age: 80
End: 2025-03-03

## 2025-03-03 ENCOUNTER — APPOINTMENT (OUTPATIENT)
Dept: FAMILY MEDICINE | Facility: CLINIC | Age: 80
End: 2025-03-03
Payer: MEDICARE

## 2025-03-03 VITALS
WEIGHT: 185 LBS | OXYGEN SATURATION: 97 % | RESPIRATION RATE: 14 BRPM | BODY MASS INDEX: 29.73 KG/M2 | TEMPERATURE: 97.7 F | SYSTOLIC BLOOD PRESSURE: 122 MMHG | HEIGHT: 66 IN | DIASTOLIC BLOOD PRESSURE: 74 MMHG | HEART RATE: 68 BPM

## 2025-03-03 DIAGNOSIS — J01.90 ACUTE SINUSITIS, UNSPECIFIED: ICD-10-CM

## 2025-03-03 PROCEDURE — 99213 OFFICE O/P EST LOW 20 MIN: CPT

## 2025-03-03 PROCEDURE — G2211 COMPLEX E/M VISIT ADD ON: CPT

## 2025-03-03 RX ORDER — ROSUVASTATIN CALCIUM 20 MG/1
20 TABLET, FILM COATED ORAL DAILY
Refills: 0 | Status: ACTIVE | COMMUNITY
Start: 2025-03-03

## 2025-03-03 RX ORDER — ROSUVASTATIN CALCIUM 20 MG/1
20 TABLET, FILM COATED ORAL
Qty: 90 | Refills: 0 | Status: ACTIVE | COMMUNITY
Start: 2025-02-20

## 2025-03-03 RX ORDER — FAMOTIDINE 40 MG/1
40 TABLET, FILM COATED ORAL
Qty: 90 | Refills: 0 | Status: DISCONTINUED | COMMUNITY
Start: 2025-01-06 | End: 2025-03-03

## 2025-03-06 ENCOUNTER — NON-APPOINTMENT (OUTPATIENT)
Age: 80
End: 2025-03-06

## 2025-03-28 ENCOUNTER — NON-APPOINTMENT (OUTPATIENT)
Age: 80
End: 2025-03-28

## 2025-04-19 ENCOUNTER — NON-APPOINTMENT (OUTPATIENT)
Age: 80
End: 2025-04-19

## 2025-05-05 ENCOUNTER — NON-APPOINTMENT (OUTPATIENT)
Age: 80
End: 2025-05-05

## 2025-05-06 ENCOUNTER — APPOINTMENT (OUTPATIENT)
Dept: PULMONOLOGY | Facility: CLINIC | Age: 80
End: 2025-05-06
Payer: MEDICARE

## 2025-05-06 VITALS
HEIGHT: 66 IN | SYSTOLIC BLOOD PRESSURE: 122 MMHG | WEIGHT: 185 LBS | DIASTOLIC BLOOD PRESSURE: 72 MMHG | BODY MASS INDEX: 29.73 KG/M2 | TEMPERATURE: 97.2 F | OXYGEN SATURATION: 100 % | HEART RATE: 88 BPM

## 2025-05-06 DIAGNOSIS — R91.1 SOLITARY PULMONARY NODULE: ICD-10-CM

## 2025-05-06 PROCEDURE — 99215 OFFICE O/P EST HI 40 MIN: CPT

## 2025-05-06 RX ORDER — OMEGA-3/DHA/EPA/FISH OIL 300-1000MG
CAPSULE ORAL
Refills: 0 | Status: ACTIVE | COMMUNITY

## 2025-05-08 DIAGNOSIS — K63.8219 SMALL INTESTINAL BACTERIAL OVERGROWTH, UNSPECIFIED: ICD-10-CM

## 2025-06-16 ENCOUNTER — APPOINTMENT (OUTPATIENT)
Dept: FAMILY MEDICINE | Facility: CLINIC | Age: 80
End: 2025-06-16
Payer: MEDICARE

## 2025-06-16 VITALS
HEART RATE: 63 BPM | WEIGHT: 182 LBS | RESPIRATION RATE: 12 BRPM | BODY MASS INDEX: 29.25 KG/M2 | OXYGEN SATURATION: 96 % | SYSTOLIC BLOOD PRESSURE: 130 MMHG | HEIGHT: 66 IN | DIASTOLIC BLOOD PRESSURE: 80 MMHG | TEMPERATURE: 98.4 F

## 2025-06-16 PROBLEM — I77.4 CELIAC ARTERY STENOSIS: Status: ACTIVE | Noted: 2025-06-16

## 2025-06-16 PROBLEM — R10.12 ACUTE LUQ PAIN: Status: RESOLVED | Noted: 2024-05-22 | Resolved: 2025-06-16

## 2025-06-16 PROCEDURE — G2211 COMPLEX E/M VISIT ADD ON: CPT

## 2025-06-16 PROCEDURE — 99214 OFFICE O/P EST MOD 30 MIN: CPT

## 2025-06-16 RX ORDER — SACCHAROMYCES BOULARDII 50 MG
CAPSULE ORAL
Refills: 0 | Status: ACTIVE | COMMUNITY

## 2025-06-16 RX ORDER — MAGNESIUM OXIDE 400 MG/1
400 TABLET ORAL
Refills: 0 | Status: ACTIVE | COMMUNITY

## 2025-07-01 ENCOUNTER — NON-APPOINTMENT (OUTPATIENT)
Age: 80
End: 2025-07-01

## 2025-07-07 ENCOUNTER — APPOINTMENT (OUTPATIENT)
Dept: PULMONOLOGY | Facility: CLINIC | Age: 80
End: 2025-07-07

## 2025-07-16 ENCOUNTER — APPOINTMENT (OUTPATIENT)
Dept: OPHTHALMOLOGY | Facility: CLINIC | Age: 80
End: 2025-07-16
Payer: MEDICARE

## 2025-07-16 ENCOUNTER — NON-APPOINTMENT (OUTPATIENT)
Age: 80
End: 2025-07-16

## 2025-07-16 PROCEDURE — 92014 COMPRE OPH EXAM EST PT 1/>: CPT

## 2025-08-02 ENCOUNTER — RESULT CHARGE (OUTPATIENT)
Age: 80
End: 2025-08-02

## 2025-08-02 ENCOUNTER — APPOINTMENT (OUTPATIENT)
Dept: FAMILY MEDICINE | Facility: CLINIC | Age: 80
End: 2025-08-02
Payer: MEDICARE

## 2025-08-02 VITALS
HEIGHT: 66 IN | RESPIRATION RATE: 12 BRPM | HEART RATE: 68 BPM | WEIGHT: 180 LBS | TEMPERATURE: 98.1 F | OXYGEN SATURATION: 97 % | BODY MASS INDEX: 28.93 KG/M2 | SYSTOLIC BLOOD PRESSURE: 120 MMHG | DIASTOLIC BLOOD PRESSURE: 70 MMHG

## 2025-08-02 DIAGNOSIS — J02.9 ACUTE PHARYNGITIS, UNSPECIFIED: ICD-10-CM

## 2025-08-02 LAB
FLUAV SPEC QL CULT: NEGATIVE
FLUBV AG SPEC QL IA: NEGATIVE
S PYO AG SPEC QL IA: NEGATIVE

## 2025-08-02 PROCEDURE — 99214 OFFICE O/P EST MOD 30 MIN: CPT

## 2025-08-02 PROCEDURE — G2211 COMPLEX E/M VISIT ADD ON: CPT

## 2025-08-02 PROCEDURE — 87804 INFLUENZA ASSAY W/OPTIC: CPT | Mod: QW

## 2025-08-02 PROCEDURE — 87880 STREP A ASSAY W/OPTIC: CPT | Mod: QW

## 2025-08-05 LAB
INFLUENZA A RESULT: NOT DETECTED
INFLUENZA B RESULT: NOT DETECTED
RESP SYN VIRUS RESULT: NOT DETECTED
S PYO DNA THROAT QL NAA+PROBE: NOT DETECTED
SARS-COV-2 AG RESP QL IA.RAPID: NEGATIVE
SARS-COV-2 RESULT: NOT DETECTED

## 2025-09-16 ENCOUNTER — NON-APPOINTMENT (OUTPATIENT)
Age: 80
End: 2025-09-16

## 2025-09-17 ENCOUNTER — APPOINTMENT (OUTPATIENT)
Dept: FAMILY MEDICINE | Facility: CLINIC | Age: 80
End: 2025-09-17
Payer: MEDICARE

## 2025-09-17 VITALS — DIASTOLIC BLOOD PRESSURE: 64 MMHG | SYSTOLIC BLOOD PRESSURE: 104 MMHG

## 2025-09-17 VITALS
HEIGHT: 66 IN | RESPIRATION RATE: 12 BRPM | TEMPERATURE: 98.4 F | DIASTOLIC BLOOD PRESSURE: 50 MMHG | WEIGHT: 178 LBS | SYSTOLIC BLOOD PRESSURE: 104 MMHG | OXYGEN SATURATION: 97 % | HEART RATE: 68 BPM | BODY MASS INDEX: 28.61 KG/M2

## 2025-09-17 DIAGNOSIS — F41.9 ANXIETY DISORDER, UNSPECIFIED: ICD-10-CM

## 2025-09-17 DIAGNOSIS — E78.00 PURE HYPERCHOLESTEROLEMIA, UNSPECIFIED: ICD-10-CM

## 2025-09-17 DIAGNOSIS — I10 ESSENTIAL (PRIMARY) HYPERTENSION: ICD-10-CM

## 2025-09-17 DIAGNOSIS — G44.221 CHRONIC TENSION-TYPE HEADACHE, INTRACTABLE: ICD-10-CM

## 2025-09-17 PROCEDURE — G2211 COMPLEX E/M VISIT ADD ON: CPT

## 2025-09-17 PROCEDURE — 99214 OFFICE O/P EST MOD 30 MIN: CPT

## (undated) DEVICE — DRAPE 3/4 SHEET W REINFORCEMENT 56X77"

## (undated) DEVICE — SYR LUER LOK 50CC

## (undated) DEVICE — TUBING TRUWAVE PRESSURE MALE/FEMALE 72"

## (undated) DEVICE — GOWN TRIMAX LG

## (undated) DEVICE — POSITIONER CARDIAC BUMP

## (undated) DEVICE — TUBING KIT FAST START ATF 40

## (undated) DEVICE — FRA-ESU BOVIE FORCE TRIAD T6D04777DX: Type: DURABLE MEDICAL EQUIPMENT

## (undated) DEVICE — VESSEL LOOP MAXI-RED  0.120" X 16"

## (undated) DEVICE — CONNECTOR "Y" 3/8 X 1/4 X 3/8"

## (undated) DEVICE — SUT VICRYL 0 36" CTX UNDYED

## (undated) DEVICE — GLV 7.5 PROTEXIS (WHITE)

## (undated) DEVICE — PACK CARDIAC YELLOW

## (undated) DEVICE — Device

## (undated) DEVICE — SYR LUER LOK 20CC

## (undated) DEVICE — LIVANOVA SMART PERFUSION TUBING 0.375X3/32" X 6FT

## (undated) DEVICE — SYNOVIS VASCULAR PROBE 1.5MM 15CM

## (undated) DEVICE — TUBING SUCTION 20FT

## (undated) DEVICE — TUBING IV SET MICROCLAVE ADAPTER

## (undated) DEVICE — PREP CHLORAPREP HI-LITE ORANGE 26ML

## (undated) DEVICE — SYNTHES REAMING ROD WITH BALL TIP 2.5MM 950MM

## (undated) DEVICE — BLADE SCALPEL SAFETYLOCK #10

## (undated) DEVICE — GLV 8 PROTEXIS (WHITE)

## (undated) DEVICE — DRAIN RESERVOIR FOR JACKSON PRATT 100CC CARDINAL

## (undated) DEVICE — FILTER REINFUSION FOR SALVAGED BLOOD DISP

## (undated) DEVICE — MEDTRONIC URCHIN EVO HEART POSITIONER & CANISTER TUBING SET

## (undated) DEVICE — CONNECTOR "Y" 1/2 X 3/8 X 3/8"

## (undated) DEVICE — DRAPE TOWEL BLUE 17" X 24"

## (undated) DEVICE — SUT SOFSILK 0 30" TIES

## (undated) DEVICE — SUT SOFSILK 0 18" TIES

## (undated) DEVICE — SUT VICRYL 1 36" CTX UNDYED

## (undated) DEVICE — SUT PROLENE 6-0 30" C-1

## (undated) DEVICE — BLADE SCALPEL SAFETYLOCK #15

## (undated) DEVICE — SUCTION TIP KAMVAC MINI

## (undated) DEVICE — DRAPE 1/2 SHEET 40X57"

## (undated) DEVICE — DRSG DERMABOND PRINEO 22CM

## (undated) DEVICE — SOL IRR POUR NS 0.9% 500ML

## (undated) DEVICE — STAPLER SKIN VISI-STAT 35 WIDE

## (undated) DEVICE — SUT STRATAFIX SPIRAL MONOCRYL PLUS 4-0 45CM PS-2 UNDYED

## (undated) DEVICE — SUCTION YANKAUER BULBOUS TIP NO VENT

## (undated) DEVICE — MEDTRONIC CLEARVIEW BLOWER MISTER KIT W TUBING SET

## (undated) DEVICE — GOWN TRIMAX XXL

## (undated) DEVICE — POSITIONER FOAM EGG CRATE ULNAR 2PCS (PINK)

## (undated) DEVICE — SOL NORMOSOL-R PH7.4 1000ML

## (undated) DEVICE — NDL COUNTER FOAM AND MAGNET 40-70

## (undated) DEVICE — DRSG ACE BANDAGE 6"

## (undated) DEVICE — BLADE SCALPEL SAFETYLOCK #11

## (undated) DEVICE — DRSG STERISTRIPS 0.5 X 4"

## (undated) DEVICE — FOLEY TRAY 16FR 5CC LF LUBRISIL ADVANCE TEMP CLOSED

## (undated) DEVICE — GLV 7 PROTEXIS (WHITE)

## (undated) DEVICE — LAP PAD 18 X 18"

## (undated) DEVICE — SUT SILK 2-0 18" SH (POP-OFF)

## (undated) DEVICE — SUT ETHIBOND 5 4-30" V-37

## (undated) DEVICE — GETINGE VASOVIEW 7 ENDOSCOPIC VESSEL HARVESTING SYSTEM

## (undated) DEVICE — SUT VICRYL 0 36" CT-1 UNDYED

## (undated) DEVICE — MIDAS REX MR8 TAPERED SM BORE 2.3MM X 7CM FOOTED

## (undated) DEVICE — PREP SCRUB BRUSH W CHG 4%

## (undated) DEVICE — GOWN LG

## (undated) DEVICE — SYS VEIN HARVESTING VIRTUOSAPH PLUS W/ RADIAL

## (undated) DEVICE — PACK CUSTOM W/INSPIRE OXYGENATOR

## (undated) DEVICE — SUT VICRYL 3-0 27" CT-1

## (undated) DEVICE — MIDAS REX MR8 METAL CUTTER 1.6MM X 9CM

## (undated) DEVICE — DRSG OPSITE 2.5 X 2"

## (undated) DEVICE — DRAPE IOBAN 33" X 23"

## (undated) DEVICE — SYR ASEPTO

## (undated) DEVICE — STABILIZER HAND ASSISTANT ATTACHMENT W STABLESOFT 2S

## (undated) DEVICE — SUT STRATAFIX SYMMETRIC PDS PLUS 1 18" CTX VIOLET

## (undated) DEVICE — PACK BASIC

## (undated) DEVICE — CANISTER SUCTION 2000CC

## (undated) DEVICE — TOURNIQUET SET 12FR (1 RED, 1 BLUE, 1 SNARE) 7"

## (undated) DEVICE — PAD MONITORING LOW LEVEL II

## (undated) DEVICE — VENODYNE/SCD SLEEVE CALF MEDIUM

## (undated) DEVICE — SUT ETHIBOND 1 30" CT-1

## (undated) DEVICE — CONNECTOR CARDIAC 1:1 FOR HUBLESS DRAINS

## (undated) DEVICE — WARMING BLANKET FULL ADULT

## (undated) DEVICE — NDL HYPO SAFE 18G X 1.5" (PINK)

## (undated) DEVICE — SUT PROLENE 8-0 24" BV175-6

## (undated) DEVICE — OSTEOTOME 10MM X 2.5IN

## (undated) DEVICE — CONNECTOR STRAIGHT 3/8 X 1/2"

## (undated) DEVICE — DRAIN CHANNEL 32FR ROUND HUBLESS FULL FLUTED

## (undated) DEVICE — DRSG TEGADERM 6"X8"

## (undated) DEVICE — BEAVER BLADE MINI SHARP ALL ROUND (BLUE)

## (undated) DEVICE — PACK UNIVERSAL CARDIAC

## (undated) DEVICE — GLV 8.5 PROTEXIS (WHITE)

## (undated) DEVICE — SUT BOOT STANDARD (YELLOW) 5 PAIR

## (undated) DEVICE — SAW BLADE MICROAIRE OSCILLATING LG 0.9X64X33.5MM

## (undated) DEVICE — SYR LUER LOK 3CC

## (undated) DEVICE — MEDICATION LABELS W MARKER

## (undated) DEVICE — MIDAS REX MR8 METAL CUTTER DIAMOND WHEEL 25.4MM X 9CM

## (undated) DEVICE — DRAIN PLEUROVAC

## (undated) DEVICE — NDL HYPO SAFE 22G X 1.5" (BLACK)

## (undated) DEVICE — DRSG DERMABOND PRINEO 60CM

## (undated) DEVICE — GLV 6.5 PROTEXIS (WHITE)

## (undated) DEVICE — DRAPE MAYO STAND 30"

## (undated) DEVICE — SUT SILK 5-0 60" TIES

## (undated) DEVICE — MIDAS REX MR8 TAPERED LG BORE 3MM X 14CM

## (undated) DEVICE — AORTIC PUNCH 4.0MM LONG LENGTH HANDLE

## (undated) DEVICE — MIDAS REX MR8 ACORN LG BORE 9MM X 14CM

## (undated) DEVICE — STOPCOCK 4-WAY (BLUE) DISCOFIX SPIN-LOCK CONNECTOR

## (undated) DEVICE — SUT STAINLESS STEEL 5 18" CCS

## (undated) DEVICE — CHEST DRAIN OASIS DRY SUCTION WATER SEAL

## (undated) DEVICE — SUT PROLENE 7-0 24" BV-1

## (undated) DEVICE — SUT PROLENE 4-0 30" SH-1

## (undated) DEVICE — DRSG KLING 6"

## (undated) DEVICE — GOWN SLEEVES

## (undated) DEVICE — DRSG OPSITE 13.75 X 4"

## (undated) DEVICE — SUT MONOCRYL 4-0 18" PS-2

## (undated) DEVICE — SUT STRATAFIX SPIRAL PDS PLUS 2-0 45CM CT-1

## (undated) DEVICE — CONNECTOR STRAIGHT 3/8 X 3/8"

## (undated) DEVICE — PACING CABLE (BROWN) A/V TEMP SCREW DOWN 12FT

## (undated) DEVICE — SAW BLADE MICROAIRE STERNUM 1X34X9.4MM

## (undated) DEVICE — SPECIMEN CONTAINER 100ML

## (undated) DEVICE — SYR LUER LOK 30CC

## (undated) DEVICE — TISSUE STABILIZER MEDTRONIC OCTOPUS EVOLUTION

## (undated) DEVICE — TUBING SUCTION NON CONDUCTIVE 316X18" STERILE

## (undated) DEVICE — HOOD T5 PEELAWAY

## (undated) DEVICE — SOL IRR POUR H2O 250ML

## (undated) DEVICE — SAW BLADE SYNVASIVE OSCILLATING

## (undated) DEVICE — SUT PLEDGET SOFT LARGE 3/8" X 3/16" X 1/16" X6

## (undated) DEVICE — SAW BLADE STRYKER SAGITTAL DUAL CUT 25X90X1.27MM

## (undated) DEVICE — POSITIONER FOAM BUMP FLAT TOP 10X6X4" LRG

## (undated) DEVICE — WARMING BLANKET UPPER ADULT

## (undated) DEVICE — SUMP PERICARDIAL 20FR 1/4" ADULT

## (undated) DEVICE — TRAP SPECIMEN SPUTUM 40CC

## (undated) DEVICE — FEEDING TUBE NG SUMP 16FR 48"

## (undated) DEVICE — SOL IRR BAG NS 0.9% 3000ML

## (undated) DEVICE — DRAIN DRAINGE CHEST DRY ADL DUAL

## (undated) DEVICE — SUT DOUBLE 6 WIRE STERNAL